# Patient Record
Sex: FEMALE | Race: BLACK OR AFRICAN AMERICAN | NOT HISPANIC OR LATINO | Employment: OTHER | URBAN - METROPOLITAN AREA
[De-identification: names, ages, dates, MRNs, and addresses within clinical notes are randomized per-mention and may not be internally consistent; named-entity substitution may affect disease eponyms.]

---

## 2017-03-12 ENCOUNTER — HOSPITAL ENCOUNTER (EMERGENCY)
Facility: HOSPITAL | Age: 57
Discharge: HOME | End: 2017-03-13
Attending: EMERGENCY MEDICINE
Payer: MEDICARE

## 2017-03-12 VITALS
TEMPERATURE: 99 F | RESPIRATION RATE: 20 BRPM | HEART RATE: 64 BPM | DIASTOLIC BLOOD PRESSURE: 94 MMHG | SYSTOLIC BLOOD PRESSURE: 151 MMHG | OXYGEN SATURATION: 93 %

## 2017-03-12 DIAGNOSIS — R53.1 WEAKNESS: Primary | ICD-10-CM

## 2017-03-12 PROBLEM — R20.1 HYPOESTHESIA: Status: ACTIVE | Noted: 2017-03-12

## 2017-03-12 LAB
ABO + RH BLD: NORMAL
ALBUMIN SERPL BCP-MCNC: 3.3 G/DL
ALP SERPL-CCNC: 75 U/L
ALT SERPL W/O P-5'-P-CCNC: 12 U/L
AMPHET+METHAMPHET UR QL: NEGATIVE
ANION GAP SERPL CALC-SCNC: 11 MMOL/L
APAP SERPL-MCNC: <3 UG/ML
AST SERPL-CCNC: 24 U/L
BARBITURATES UR QL SCN>200 NG/ML: NEGATIVE
BASOPHILS # BLD AUTO: 0.04 K/UL
BASOPHILS NFR BLD: 0.6 %
BENZODIAZ UR QL SCN>200 NG/ML: NORMAL
BILIRUB SERPL-MCNC: 0.3 MG/DL
BLD GP AB SCN CELLS X3 SERPL QL: NORMAL
BLOOD GROUP ANTIBODIES SERPL: NORMAL
BUN SERPL-MCNC: 12 MG/DL
BZE UR QL SCN: NEGATIVE
CALCIUM SERPL-MCNC: 9 MG/DL
CANNABINOIDS UR QL SCN: NEGATIVE
CHLORIDE SERPL-SCNC: 105 MMOL/L
CHOLEST/HDLC SERPL: 5.4 {RATIO}
CO2 SERPL-SCNC: 26 MMOL/L
CREAT SERPL-MCNC: 1 MG/DL
CREAT UR-MCNC: 93 MG/DL
DIFFERENTIAL METHOD: ABNORMAL
EOSINOPHIL # BLD AUTO: 0.3 K/UL
EOSINOPHIL NFR BLD: 4.8 %
ERYTHROCYTE [DISTWIDTH] IN BLOOD BY AUTOMATED COUNT: 14.8 %
EST. GFR  (AFRICAN AMERICAN): >60 ML/MIN/1.73 M^2
EST. GFR  (NON AFRICAN AMERICAN): >60 ML/MIN/1.73 M^2
ETHANOL SERPL-MCNC: <10 MG/DL
GLUCOSE SERPL-MCNC: 86 MG/DL
HCT VFR BLD AUTO: 35 %
HDL/CHOLESTEROL RATIO: 18.6 %
HDLC SERPL-MCNC: 204 MG/DL
HDLC SERPL-MCNC: 38 MG/DL
HGB BLD-MCNC: 11.9 G/DL
INR PPP: 1.1
LDLC SERPL CALC-MCNC: 134.8 MG/DL
LYMPHOCYTES # BLD AUTO: 3.6 K/UL
LYMPHOCYTES NFR BLD: 53.3 %
MCH RBC QN AUTO: 27.2 PG
MCHC RBC AUTO-ENTMCNC: 34 %
MCV RBC AUTO: 80 FL
METHADONE UR QL SCN>300 NG/ML: NEGATIVE
MONOCYTES # BLD AUTO: 0.3 K/UL
MONOCYTES NFR BLD: 4.2 %
NEUTROPHILS # BLD AUTO: 2.5 K/UL
NEUTROPHILS NFR BLD: 37 %
NONHDLC SERPL-MCNC: 166 MG/DL
OPIATES UR QL SCN: NEGATIVE
PCP UR QL SCN>25 NG/ML: NEGATIVE
PLATELET # BLD AUTO: 359 K/UL
PMV BLD AUTO: 10.4 FL
POCT GLUCOSE: 81 MG/DL (ref 70–110)
POTASSIUM SERPL-SCNC: 3.1 MMOL/L
PROT SERPL-MCNC: 7.6 G/DL
PROTHROMBIN TIME: 11.2 SEC
RBC # BLD AUTO: 4.37 M/UL
SODIUM SERPL-SCNC: 142 MMOL/L
TOXICOLOGY INFORMATION: NORMAL
TRIGL SERPL-MCNC: 156 MG/DL
TROPONIN I SERPL DL<=0.01 NG/ML-MCNC: 0.01 NG/ML
TSH SERPL DL<=0.005 MIU/L-ACNC: 0.43 UIU/ML
WBC # BLD AUTO: 6.83 K/UL

## 2017-03-12 PROCEDURE — 96374 THER/PROPH/DIAG INJ IV PUSH: CPT

## 2017-03-12 PROCEDURE — 63600175 PHARM REV CODE 636 W HCPCS: Performed by: EMERGENCY MEDICINE

## 2017-03-12 PROCEDURE — 96375 TX/PRO/DX INJ NEW DRUG ADDON: CPT

## 2017-03-12 PROCEDURE — 99285 EMERGENCY DEPT VISIT HI MDM: CPT | Mod: ,,, | Performed by: EMERGENCY MEDICINE

## 2017-03-12 PROCEDURE — 93010 ELECTROCARDIOGRAM REPORT: CPT | Mod: ,,, | Performed by: INTERNAL MEDICINE

## 2017-03-12 PROCEDURE — 84443 ASSAY THYROID STIM HORMONE: CPT

## 2017-03-12 PROCEDURE — 86870 RBC ANTIBODY IDENTIFICATION: CPT

## 2017-03-12 PROCEDURE — 80320 DRUG SCREEN QUANTALCOHOLS: CPT

## 2017-03-12 PROCEDURE — 80061 LIPID PANEL: CPT

## 2017-03-12 PROCEDURE — 84484 ASSAY OF TROPONIN QUANT: CPT

## 2017-03-12 PROCEDURE — 85025 COMPLETE CBC W/AUTO DIFF WBC: CPT

## 2017-03-12 PROCEDURE — 63600175 PHARM REV CODE 636 W HCPCS: Performed by: STUDENT IN AN ORGANIZED HEALTH CARE EDUCATION/TRAINING PROGRAM

## 2017-03-12 PROCEDURE — 99223 1ST HOSP IP/OBS HIGH 75: CPT | Mod: ,,, | Performed by: PSYCHIATRY & NEUROLOGY

## 2017-03-12 PROCEDURE — 86901 BLOOD TYPING SEROLOGIC RH(D): CPT

## 2017-03-12 PROCEDURE — 80329 ANALGESICS NON-OPIOID 1 OR 2: CPT

## 2017-03-12 PROCEDURE — 99285 EMERGENCY DEPT VISIT HI MDM: CPT | Mod: 25

## 2017-03-12 PROCEDURE — 86900 BLOOD TYPING SEROLOGIC ABO: CPT

## 2017-03-12 PROCEDURE — 82570 ASSAY OF URINE CREATININE: CPT

## 2017-03-12 PROCEDURE — 80053 COMPREHEN METABOLIC PANEL: CPT

## 2017-03-12 PROCEDURE — 85610 PROTHROMBIN TIME: CPT

## 2017-03-12 RX ORDER — MIDAZOLAM HYDROCHLORIDE 1 MG/ML
2 INJECTION INTRAMUSCULAR; INTRAVENOUS
Status: COMPLETED | OUTPATIENT
Start: 2017-03-12 | End: 2017-03-12

## 2017-03-12 RX ORDER — ONDANSETRON 2 MG/ML
4 INJECTION INTRAMUSCULAR; INTRAVENOUS
Status: COMPLETED | OUTPATIENT
Start: 2017-03-12 | End: 2017-03-12

## 2017-03-12 RX ADMIN — ONDANSETRON 4 MG: 2 INJECTION INTRAMUSCULAR; INTRAVENOUS at 08:03

## 2017-03-12 RX ADMIN — MIDAZOLAM HYDROCHLORIDE 2 MG: 1 INJECTION, SOLUTION INTRAMUSCULAR; INTRAVENOUS at 09:03

## 2017-03-12 NOTE — ED PROVIDER NOTES
"Encounter Date: 3/12/2017    SCRIBE #1 NOTE: I, Ari Abdullahi, am scribing for, and in the presence of,  Dr. Israel. I have scribed the following portions of the note - the Resident attestation.       History     Chief Complaint   Patient presents with    Numbness     numbness in right arm and blurred vision since 1130am today     Review of patient's allergies indicates:   Allergen Reactions    Amlodipine Other (See Comments)     Patient says that she does not do well with generic amlodipine but does do well with Norvasc (amlodipine).    Bentyl [dicyclomine] Hives    Darvocet a500 [propoxyphene n-acetaminophen] Hives    Morphine Nausea And Vomiting    Toradol [ketorolac] Hives    Zofran [ondansetron hcl (pf)] Nausea And Vomiting     HPI Comments: Ms. Woodard is a 56 yo female w/ PMH significant for HTN, ulcerative colitis, portal vein thrombosis, presenting via EMS w/ complaints of RUE numbness/tingling, weakness, and R-sided blurry vision with onset at 3239-1096 today.    Pt reports that her RUE had cramping pains followed by RUE weakness, and R-sided blurry vision and RLE numbness/tingling.  EMS reports that they were unable to appreciate focal weakness on their exam.  On arrival to ED, intern exam found RUE weakness.  Pt states this has never happened to her before.    Pt reports history of a clot, unclear where.  EMR search reveals portal vein thrombosis.  Pt states that she has been off anticoagulation for >1 yr.    Pt reports that her BP runs "normal," unable to specify any numbers.  Pt states she had 8/10, sharp, chest pain the night prior to presentation.  States that this pain comes/goes, not associated with exertion, lasts 2 minutes duration.  Reports she has not been taking her BP meds.  Initially stated she wasn't taking them because her BP was "normal," then later states the meds are too expensive.    Pt reports she has had a "cold" with shortness of breath, cough, fevers/chills x1 week.    Pt " reports that she drank a wine cooler last night.  Denies recreational drug use or cigarette smoking.  Notes that she was around others that smoked marijuana yesterday, but states that she did not smoke any.     The history is provided by the patient and the EMS personnel.     Past Medical History:   Diagnosis Date    Asthma     Hypertension     Liver abscess     hx    Portal vein thrombosis 10/2013    Portal vein thrombosis 10/2013    Ulcerative colitis 9/15/2013     Past Surgical History:   Procedure Laterality Date    ABDOMINAL SURGERY      3 exploratory surgeries    APPENDECTOMY       SECTION, LOW TRANSVERSE      3 C-sections    CHOLECYSTECTOMY      COLONOSCOPY      COLONOSCOPY N/A 2016    Procedure: COLONOSCOPY;  Surgeon: Brad King MD;  Location: [facility] ([address] [place] [address]);  Service: Endoscopy;  Laterality: N/A;    EXPLORATORY LAPAROTOMY W/ BOWEL RESECTION      HERNIA REPAIR      HYSTERECTOMY      INCISIONAL HERNIA REPAIR      UPPER GASTROINTESTINAL ENDOSCOPY       Family History   Problem Relation Age of Onset    Hypertension Maternal Grandmother     Cirrhosis Maternal Aunt     Breast cancer Maternal Aunt     Colon cancer Maternal Uncle     Breast cancer Cousin     Celiac disease Neg Hx     Colon polyps Neg Hx     Crohn's disease Neg Hx     Cystic fibrosis Neg Hx     Esophageal cancer Neg Hx     Hemochromatosis Neg Hx     Inflammatory bowel disease Neg Hx     Irritable bowel syndrome Neg Hx     Liver cancer Neg Hx     Liver disease Neg Hx     Rectal cancer Neg Hx     Stomach cancer Neg Hx     Ulcerative colitis Neg Hx     Scott's disease Neg Hx      Social History   Substance Use Topics    Smoking status: Former Smoker     Packs/day: 0.10     Years: 0.50     Types: Cigarettes     Quit date: 1976    Smokeless tobacco: Never Used    Alcohol use 0.6 oz/week     1 Glasses of wine per week      Comment: rare     Review of Systems   Constitutional:  Positive for chills and fever.   HENT: Negative for hearing loss.    Eyes: Positive for visual disturbance.   Respiratory: Positive for cough. Negative for shortness of breath.    Cardiovascular: Positive for chest pain. Negative for palpitations and leg swelling.   Gastrointestinal: Negative for nausea and vomiting.   Neurological: Positive for weakness, numbness and headaches. Negative for seizures and speech difficulty.       Physical Exam   Initial Vitals   BP Pulse Resp Temp SpO2   03/12/17 1525 03/12/17 1525 03/12/17 1525 03/12/17 1525 03/12/17 1525   203/119 77 15 98.9 °F (37.2 °C) 100 %     Physical Exam    Constitutional: She appears well-developed and well-nourished. No distress.   Cardiovascular: Normal rate, regular rhythm and normal heart sounds. Exam reveals no friction rub.    No murmur heard.  Pulmonary/Chest: Breath sounds normal. No respiratory distress. She has no wheezes.   Mild tenderness to palpation of R lumbar musculature    No focal tenderness to palpation of spinal processes throughout spine   Abdominal: Soft. Bowel sounds are normal. She exhibits no distension.   Neurological: She is alert and oriented to person, place, and time.   LOC: alert and follows requests  Speech: mild dysarthria  Orientation: Person, Place, Time  EOM (CN III, IV, VI): Full/intact  Pupils (CN III, IV, VI): PERRL  Facial Sensation (CN V): Symmetric  Facial Movement (CN VII): symmetric facial expression  Hearing (CN VIII): intact bilaterally  Gag Reflex (CN IX, X): normal/symmetric  Shoulder/Neck (CN XI): SCM-Left: Normal ; SCM-Right: Normal ; Shoulder Shrug: Normal/Symetric  Tongue (CN XII): to midline, no lacerations  Reflexes: 2+ b/l brachioradialis, biceps.  1+ patellar b/l  Motor*: Arm Left:  Normal (5/5), Leg Left:   Normal (5/5), Arm Right:   Paretic:  4-/5, Leg Right:   Normal (5/5)  Cerebellar*: Finger-to-nose normal on LUE.  Ataxia present on RUE finger-to-nose  Sensation: sensation to light touch intact in  BUE and BLE           ED Course   Procedures  Labs Reviewed   CBC W/ AUTO DIFFERENTIAL - Abnormal; Notable for the following:        Result Value    Hemoglobin 11.9 (*)     Hematocrit 35.0 (*)     MCV 80 (*)     RDW 14.8 (*)     Platelets 359 (*)     Gran% 37.0 (*)     Lymph% 53.3 (*)     All other components within normal limits   COMPREHENSIVE METABOLIC PANEL - Abnormal; Notable for the following:     Potassium 3.1 (*)     Albumin 3.3 (*)     All other components within normal limits   LIPID PANEL - Abnormal; Notable for the following:     Cholesterol 204 (*)     Triglycerides 156 (*)     HDL 38 (*)     HDL/Chol Ratio 18.6 (*)     Total Cholesterol/HDL Ratio 5.4 (*)     All other components within normal limits   ACETAMINOPHEN LEVEL - Abnormal; Notable for the following:     Acetaminophen (Tylenol), Serum <3.0 (*)     All other components within normal limits   PROTIME-INR   TSH   ALCOHOL,MEDICAL (ETHANOL)   DRUG SCREEN PANEL, URINE EMERGENCY   TROPONIN I   TYPE & SCREEN   ANTIBODY IDENTIFICATION   POCT GLUCOSE     EKG Readings: (Independently Interpreted)   Sinus mario (59).  No ST elevation/depression       X-Rays:   Independently Interpreted Readings:   Chest X-Ray: No consolidations/opacities, no pneumothorax.    Head CT: Negative for acute infarction or hemorrhage.     Medical Decision Making:   History:   Old Medical Records: I decided to obtain old medical records.  Initial Assessment:   Ms. Woodard is a 56 yo female w/ PMH significant for HTN, ulcerative colitis, portal vein thrombosis, presenting via EMS w/ complaints of RUE numbness/tingling, weakness, and R-sided blurry vision with onset at 9177-7894 today.    Differential Diagnosis:   Stroke, hypertensive emergency, drug overdose/intoxication, infection, anemia,  Independently Interpreted Test(s):   I have ordered and independently interpreted X-rays - see prior notes.  I have ordered and independently interpreted EKG Reading(s) - see prior  notes  Clinical Tests:   Lab Tests: Ordered and Reviewed  Radiological Study: Ordered and Reviewed  Medical Tests: Ordered and Reviewed  ED Management:  CT Head negative   Vascular neurology consulted  MRI Brain ordered      -MRI Brain only partially completed but no evidence of ischemia on completed portions. Pt states she feels much better.Discussed with stroke service who is comfortable with her discharge and outpatient follow up. Will D/C at this time             Scribe Attestation:   Scribe #1: I performed the above scribed service and the documentation accurately describes the services I performed. I attest to the accuracy of the note.    Attending Attestation:   Physician Attestation Statement for Resident:  As the supervising MD   Physician Attestation Statement: I have personally seen and examined this patient.   I agree with the above history. -: This is a 57 y.o. female who presents with RUE numbness and tingling with weakness in the arm. Pt also complains of chest pain that is worse when moving or coughing.    As the supervising MD I agree with the above PE.   -: On exam pt appears comfortable and in no acute distress. I am in agreement with the above neurologic exam.    As the supervising MD I agree with the above treatment, course, plan, and disposition.   -: Will discuss with stroke neurology, head CT was unremarkable. Will obtain MRI.           Physician Attestation for Scribe:  Physician Attestation Statement for Scribe #1: I, Dr. Israel, reviewed documentation, as scribed by Ari Abdullahi in my presence, and it is both accurate and complete.         MRI neg  Will d/c home        ED Course     Clinical Impression:   The encounter diagnosis was Weakness.          Michael Alvarado MD  Resident  03/13/17 0011       Serge Israel MD  03/15/17 6050

## 2017-03-12 NOTE — SUBJECTIVE & OBJECTIVE
Past Medical History:   Diagnosis Date    Asthma     Hypertension     Liver abscess     hx    Portal vein thrombosis 10/2013    Portal vein thrombosis 10/2013    Ulcerative colitis 9/15/2013     Past Surgical History:   Procedure Laterality Date    ABDOMINAL SURGERY      3 exploratory surgeries    APPENDECTOMY       SECTION, LOW TRANSVERSE      3 C-sections    CHOLECYSTECTOMY      COLONOSCOPY      COLONOSCOPY N/A 2016    Procedure: COLONOSCOPY;  Surgeon: Brad King MD;  Location: [facility] ([address] [place] [address]);  Service: Endoscopy;  Laterality: N/A;    EXPLORATORY LAPAROTOMY W/ BOWEL RESECTION      HERNIA REPAIR      HYSTERECTOMY      INCISIONAL HERNIA REPAIR      UPPER GASTROINTESTINAL ENDOSCOPY       Family History   Problem Relation Age of Onset    Hypertension Maternal Grandmother     Cirrhosis Maternal Aunt     Breast cancer Maternal Aunt     Colon cancer Maternal Uncle     Breast cancer Cousin     Celiac disease Neg Hx     Colon polyps Neg Hx     Crohn's disease Neg Hx     Cystic fibrosis Neg Hx     Esophageal cancer Neg Hx     Hemochromatosis Neg Hx     Inflammatory bowel disease Neg Hx     Irritable bowel syndrome Neg Hx     Liver cancer Neg Hx     Liver disease Neg Hx     Rectal cancer Neg Hx     Stomach cancer Neg Hx     Ulcerative colitis Neg Hx     Scott's disease Neg Hx      Social History   Substance Use Topics    Smoking status: Former Smoker     Packs/day: 0.10     Years: 0.50     Types: Cigarettes     Quit date: 1976    Smokeless tobacco: Never Used    Alcohol use 0.6 oz/week     1 Glasses of wine per week      Comment: rare     Review of patient's allergies indicates:   Allergen Reactions    Amlodipine Other (See Comments)     Patient says that she does not do well with generic amlodipine but does do well with Norvasc (amlodipine).    Bentyl [dicyclomine] Hives    Darvocet a500 [propoxyphene n-acetaminophen] Hives    Morphine  Nausea And Vomiting    Toradol [ketorolac] Hives    Zofran [ondansetron hcl (pf)] Nausea And Vomiting     Medications: I have reviewed the current medication administration record.      (Not in a hospital admission)    Review of Systems   Constitutional: Negative for chills and fever.   HENT: Negative for facial swelling.    Eyes: Negative for visual disturbance.   Respiratory: Negative for cough and shortness of breath.    Cardiovascular: Negative for leg swelling.   Gastrointestinal: Negative for vomiting.   Musculoskeletal: Negative for joint swelling.   Skin: Negative for rash.   Neurological: Positive for weakness and numbness. Negative for dizziness, facial asymmetry and headaches.   Psychiatric/Behavioral: Negative for agitation and behavioral problems.     Objective:     Vital Signs (Most Recent):  Temp: 98.9 °F (37.2 °C) (03/12/17 1525)  Pulse: 60 (03/12/17 1727)  Resp: 15 (03/12/17 1727)  BP: (!) 163/74 (03/12/17 1733)  SpO2: 99 % (03/12/17 1733)    Vital Signs Range (Last 24H):  Temp:  [98.9 °F (37.2 °C)]   Pulse:  [58-77]   Resp:  [15-19]   BP: (162-203)/()   SpO2:  [98 %-100 %]     Physical Exam   Constitutional: She is oriented to person, place, and time. She appears well-developed and well-nourished. No distress.   HENT:   Head: Normocephalic and atraumatic.   Eyes: EOM are normal. Pupils are equal, round, and reactive to light.   Neck: Normal range of motion.   Cardiovascular: Normal rate.    Pulmonary/Chest: Effort normal. No respiratory distress.   Abdominal: Soft. She exhibits no distension.   Musculoskeletal: Normal range of motion.   Neurological: She is alert and oriented to person, place, and time.   Skin: Skin is warm and dry.   Psychiatric: She has a normal mood and affect. Her behavior is normal.   Vitals reviewed.      Neurological Exam:   LOC: alert and follows requests  Language: No aphasia  Speech: No dysarthria  Orientation: Person, Place, Time  Memory: Recent memory intact,  Remote memory intact, Age correct, Month correct  Visual Fields (CN II): Full  EOM (CN III, IV, VI): Full/intact  Oculocephalics: normal  Pupils (CN III, IV, VI): PERRL  Facial Sensation (CN V): Facial sensory loss right upper and right lower  Facial Movement (CN VII): symmetric facial expression  Hearing (CN VIII): intact bilaterally  Motor*: Arm Left:  Normal (5/5), Leg Left:   Normal (5/5), Arm Right:   Normal (5/5), Leg Right:   Normal (5/5)  Cerebellar*: Normal limb, Normal gait  , Normal stance  Sensation: gerardo-hypoesthesia right  Tone: Arm-Left: normal; Leg-Left: normal; Arm-Right: normal; Leg-Right: normal     R hand  weakness        Stroke Team Times:   Last Known Normal Date and Time : 3/12/209111:30  Symptom Onset Date and Time:3/12/615752:30  Stroke Team Called Date and Time: 3/12/079008:52  Stroke Team Arrived Date and Time: 3/12/813667:00  CT Interpretation Time: 16:24  Intervention Time: 16:31 (no intervention needed)  NIH Stroke Scale:  Interval: baseline (upon arrival/admit)  Level of Consciousness: 0 - alert  LOC Questions: 0 - answers both correctly  LOC Commands: 0 - performs both correctly  Best Gaze: 0 - normal  Visual: 0 - no visual loss  Facial Palsy: 0 - normal  Motor Left Arm: 0 - no drift  Motor Right Arm: 0 - no drift  Motor Left Le - no drift  Motor Right Le - no drift  Limb Ataxia: 0 - absent  Sensory: 1 - mild to moderate loss  Best Language: 0 - no aphasia  Dysarthria: 0 - normal articulation  Extinction and Inattention: 0 - no neglect  NIH Stroke Scale Total: 1  Modified Lori Scale:   Timeline: Prior to symptoms onset  Modified Lori Score: 0 - no symptoms        Laboratory:  CMP:   Recent Labs  Lab 17  1607   CALCIUM 9.0   ALBUMIN 3.3*   PROT 7.6      K 3.1*   CO2 26      BUN 12   CREATININE 1.0   ALKPHOS 75   ALT 12   AST 24   BILITOT 0.3     CBC:   Recent Labs  Lab 17  1607   WBC 6.83   RBC 4.37   HGB 11.9*   HCT 35.0*   *   MCV 80*    MCH 27.2   MCHC 34.0     Lipid Panel:   Recent Labs  Lab 03/12/17  1607   CHOL 204*   LDLCALC 134.8   HDL 38*   TRIG 156*     Coagulation:   Recent Labs  Lab 03/12/17  1607   INR 1.1     Platelet Aggregation Study: No results for input(s): PLTAGG, PLTAGINTERP, PLTAGREGLACO, ADPPLTAGGREG in the last 168 hours.  Hgb A1C: No results for input(s): HGBA1C in the last 168 hours.  TSH:   Recent Labs  Lab 03/12/17  1607   TSH 0.426       Diagnostic Results:  Brain Imaging:    CT Head. Date: 03/12/17  Acute Pathology: None

## 2017-03-12 NOTE — ED AVS SNAPSHOT
[facility]  [address] [facility] [id number]               Rosmery Woodard   3/12/2017  3:43 PM   ED    Description:  Female : 1960   Department:  [facility]           Your Care was Coordinated By:     Provider Role From To    Serge Israel MD Attending Provider 17 1538 --    Benoit Travis MD Resident 17 1527 17 2200    Yovani Rhodes MD ED Temporary Attending 17 2113 --    Michael Alvarado MD Resident 17 --      Reason for Visit     Numbness           Diagnoses this Visit        Comments    Weakness    -  Primary       ED Disposition     None           To Do List           Follow-up Information     Follow up with Meg Deluca MD.    Specialty:  Family Medicine    Contact information:    [address]  [facility] [address]  [phone]          Follow up with [facility].    Specialty:  Neurology    Contact information:    [address] [facility] [id number] [phone]    Additional information:    7th Floor      [facility]     [facility] -  Assistance  Registered nurses in the [facility] provide clinical advisement, health education, appointment booking, and other advisory services.  Call for this free service at [phone].             Medications           Message regarding Medications     Verify the changes and/or additions to your medication regime listed below are the same as discussed with your clinician today.  If any of these changes or additions are incorrect, please notify your healthcare provider.        These medications were administered today        Dose Freq    ondansetron injection 4 mg 4 mg ED 1 Time    Sig: Inject 4 mg into the vein ED 1 Time.    Class: Normal    Route: Intravenous    midazolam injection 2 mg 2 mg ED 1 Time    Sig: Inject 2 mLs (2 mg total) into the vein ED 1 Time.     Class: Normal    Route: Intravenous           Verify that the below list of medications is an accurate representation of the medications you are currently taking.  If none reported, the list may be blank. If incorrect, please contact your healthcare provider. Carry this list with you in case of emergency.           Current Medications     alprazolam (XANAX) 0.5 MG tablet Take 0.5 mg by mouth 3 (three) times daily as needed for Anxiety.     ASACOL  mg TbEC Take 1,600 mg by mouth 3 (three) times daily.     diphenhydrAMINE (BENADRYL) 25 mg capsule Take 25 mg by mouth every 6 (six) hours as needed for Itching.    diphenoxylate-atropine 2.5-0.025 mg (LOMOTIL) 2.5-0.025 mg per tablet Take 1 tablet by mouth 4 (four) times daily as needed for Diarrhea.    gabapentin (NEURONTIN) 100 MG capsule TAKE THREE CAPSULES BY MOUTH EVERY EVENING    multivitamin (THERAGRAN) per tablet Take 1 tablet by mouth once daily.     omeprazole (PRILOSEC) 20 MG capsule Take 20 mg by mouth 2 (two) times daily.     promethazine (PHENERGAN) 25 MG tablet Take 1 tablet (25 mg total) by mouth every 6 (six) hours as needed for Nausea.    tramadol (ULTRAM) 50 mg tablet TAKE 1 TABLET BY MOUTH  EVERY 8 HOURS AS NEEDED FOR PAIN    zolpidem (AMBIEN) 10 mg Tab Take 10 mg by mouth every evening.           Clinical Reference Information           Your Vitals Were     BP Pulse Temp Resp SpO2       151/94 64 98.9 °F (37.2 °C) (Oral) 20 93%       Allergies as of 3/13/2017        Reactions    Amlodipine Other (See Comments)    Patient says that she does not do well with generic amlodipine but does do well with Norvasc (amlodipine).    Bentyl [Dicyclomine] Hives    Darvocet A500 [Propoxyphene N-acetaminophen] Hives    Morphine Nausea And Vomiting    Toradol [Ketorolac] Hives    Zofran [Ondansetron Hcl (Pf)] Nausea And Vomiting      Immunizations Administered on Date of Encounter - 3/13/2017     None      ED Micro, Lab, POCT     Start Ordered       Status  Ordering Provider    03/12/17 1624 03/12/17 1624  POCT glucose  Once      Final result     03/12/17 1604 03/12/17 1603  Troponin I  STAT      Final result     03/12/17 1540 03/12/17 1539  Ethanol  Once      Final result     03/12/17 1540 03/12/17 1540  Drug screen panel, emergency  STAT      Final result     03/12/17 1540 03/12/17 1540  Acetaminophen level  STAT      Final result     03/12/17 1539 03/12/17 1538  CBC W/ AUTO DIFFERENTIAL  Once      Final result     03/12/17 1539 03/12/17 1538  Comprehensive metabolic panel  Once      Final result     03/12/17 1539 03/12/17 1538  Protime-INR  Once      Final result     03/12/17 1539 03/12/17 1538  TSH  Once      Final result     03/12/17 1539 03/12/17 1538  POCT glucose  Once      Acknowledged     03/12/17 1539 03/12/17 1538  LDL - Lipid Panel  STAT      Final result       ED Imaging Orders     Start Ordered       Status Ordering Provider    03/12/17 1637 03/12/17 1637  MRI Brain Without Contrast  1 time imaging      Final result     03/12/17 1539 03/12/17 1538  CT Head Without Contrast  1 time imaging      Final result     03/12/17 1539 03/12/17 1538  X-Ray Chest AP Portable  1 time imaging      Final result         Discharge Instructions             Weakness (Uncertain Cause)  Based on your exam today, the exact cause of your weakness is not certain. However, your weakness does not seem to be a sign of a serious illness at this time. Keep an eye on your symptoms and get medical advice as instructed below.  Home care  · Rest at home today. Do not over-exert yourself.  · Take any medicine as prescribed.  · For the next few days, drink extra fluids (unless your healthcare provider wants you to restrict fluids for other reasons). Do not skip meals.  Follow-up care  Follow up with your healthcare provider or as advised.  When to seek medical advice  Call your healthcare provider for any of the following  · Worsening of your symptoms  · Symptoms don't start getting better  within 2 days  · Fever of 100.4º F (38º C) or higher, or as directed by your healthcare provider·    Call 911  Get emergency medical care for any of these:  · Chest, arm, neck, jaw or upper back pain  · Trouble breathing  · Numbness or weakness of the face, one arm or one leg  · Slurred speech, confusion, trouble speaking, walking or seeing  · Blood in vomit or stool (black or red color)  · Loss of consciousness  Date Last Reviewed: 6/10/2015  © 6833-2982 [employer]. [address] [place] [address]. All rights reserved. This information is not intended as a substitute for professional medical care. Always follow your healthcare professional's instructions.          Smoking Cessation     If you would like to quit smoking:   You may be eligible for free services if you are a [place] resident and started smoking cigarettes before September 1, 1988.  Call the Smoking Cessation Trust (SCT) toll free at (829) 855-9262 or (060) 730-1428.   Call 1-800-QUIT-NOW if you do not meet the above criteria.             [facility] complies with applicable Federal civil rights laws and does not discriminate on the basis of race, color, national origin, age, disability, or sex.        Language Assistance Services     ATTENTION: Language assistance services are available, free of charge. Please call [phone].      ATENCIÓN: Si habla español, tiene a sandoval disposición servicios gratuitos de asistencia lingüística. Llame al [phone].     CHÚ Ý: N?u b?n nói Ti?ng Vi?t, có các d?ch v? h? tr? ngôn ng? mi?n phí dành cho b?n. G?i s? [phone].

## 2017-03-12 NOTE — ED TRIAGE NOTES
"Patient arrives via EMS for right sided "cramping" and decreased sensation, +tingling to right arm around 1pm today. Patient states was sitting in car after getting "a little upset" and reports acute cramping and locking up of right arm. Also reports associated HA and blurry vision, endorses uncontrolled hypertension and non-compliance with medication over the past few months. Called EMS after estimated about 30 minutes of symptoms without relief. Currently reports mild relief of symptoms, +tingling and HA to right arm and right leg. Full range of motion noted to all extremities, +clear and fluent speech, AAOx4  "

## 2017-03-13 NOTE — ASSESSMENT & PLAN NOTE
Rosmery Woodard is a 57 y.o. female presenting with pain, cramping, hand weakness, and hypoesthesia.     CT negative for acute stroke. MRI pending. May admit to stroke service depending on MRI results.

## 2017-03-13 NOTE — PROGRESS NOTES
Pt arrives to MRI.  Pt reports she needs medication prior to scan.  Attempted to reach ED nurse X3.

## 2017-03-13 NOTE — ASSESSMENT & PLAN NOTE
Stroke risk factor  Patient not on home BP medications  Recommend following up with PCP for BP management

## 2017-03-13 NOTE — PROGRESS NOTES
Patient transferred to MRI table without injury.  Patient placed on MRI telemetry and O2 monitoring.  Patient calm and stable NAD.  Will continue to monitor throughout MRI scan.  Telemetry room notified of portable monitoring removed for scan.  Will call telemetry room when patient is placed back on portable telemetry.

## 2017-03-13 NOTE — PROGRESS NOTES
Called ED to update pts primary nurse of unsuccessful attempt of MRI scan.  Unable to reach pts nurse via telephone.

## 2017-03-13 NOTE — PROGRESS NOTES
MRI scan uncomplete.  Patient unable to complete MRI scan, 2 mg versed given prior to scan .  Patient placed back on portable telemetry box, telemetry room notified.  Patient calm and stable, denies need.  Awaiting transport back to room ED 7.

## 2017-03-13 NOTE — CONSULTS
[facility]  Vascular Neurology  Comprehensive Stroke Center  Consult Note      Inpatient consult to Vascular (Stroke) Neurology  Consult performed by: BILL GARCIA  Consult ordered by: CHEYANNE HERNANDEZ        Subjective:     History of Present Illness:  Rosmery Woodard is a 57 y.o. Female with a PMHx of crohns, HTN, HLD, and portal vein thrombosis who presented to the ED with pain and cramping in the RUE that began at 12pm on 3/12/17. When she arrived to the hospital she noticed she had more movement in the extremity. She also admits to numbness on her R side.    She denies N/V, headache, dizziness, and changes in vision           Past Medical History:   Diagnosis Date    Asthma     Hypertension     Liver abscess     hx    Portal vein thrombosis 10/2013    Portal vein thrombosis 10/2013    Ulcerative colitis 9/15/2013     Past Surgical History:   Procedure Laterality Date    ABDOMINAL SURGERY      3 exploratory surgeries    APPENDECTOMY       SECTION, LOW TRANSVERSE      3 C-sections    CHOLECYSTECTOMY      COLONOSCOPY      COLONOSCOPY N/A 2016    Procedure: COLONOSCOPY;  Surgeon: Brad King MD;  Location: [facility] ([address] [place] [address]);  Service: Endoscopy;  Laterality: N/A;    EXPLORATORY LAPAROTOMY W/ BOWEL RESECTION      HERNIA REPAIR      HYSTERECTOMY      INCISIONAL HERNIA REPAIR      UPPER GASTROINTESTINAL ENDOSCOPY       Family History   Problem Relation Age of Onset    Hypertension Maternal Grandmother     Cirrhosis Maternal Aunt     Breast cancer Maternal Aunt     Colon cancer Maternal Uncle     Breast cancer Cousin     Celiac disease Neg Hx     Colon polyps Neg Hx     Crohn's disease Neg Hx     Cystic fibrosis Neg Hx     Esophageal cancer Neg Hx     Hemochromatosis Neg Hx     Inflammatory bowel disease Neg Hx     Irritable bowel syndrome Neg Hx     Liver cancer Neg Hx     Liver disease Neg Hx     Rectal cancer Neg Hx      Stomach cancer Neg Hx     Ulcerative colitis Neg Hx     Scott's disease Neg Hx      Social History   Substance Use Topics    Smoking status: Former Smoker     Packs/day: 0.10     Years: 0.50     Types: Cigarettes     Quit date: 9/14/1976    Smokeless tobacco: Never Used    Alcohol use 0.6 oz/week     1 Glasses of wine per week      Comment: rare     Review of patient's allergies indicates:   Allergen Reactions    Amlodipine Other (See Comments)     Patient says that she does not do well with generic amlodipine but does do well with Norvasc (amlodipine).    Bentyl [dicyclomine] Hives    Darvocet a500 [propoxyphene n-acetaminophen] Hives    Morphine Nausea And Vomiting    Toradol [ketorolac] Hives    Zofran [ondansetron hcl (pf)] Nausea And Vomiting     Medications: I have reviewed the current medication administration record.      (Not in a hospital admission)    Review of Systems   Constitutional: Negative for chills and fever.   HENT: Negative for facial swelling.    Eyes: Negative for visual disturbance.   Respiratory: Negative for cough and shortness of breath.    Cardiovascular: Negative for leg swelling.   Gastrointestinal: Negative for vomiting.   Musculoskeletal: Negative for joint swelling.   Skin: Negative for rash.   Neurological: Positive for weakness and numbness. Negative for dizziness, facial asymmetry and headaches.   Psychiatric/Behavioral: Negative for agitation and behavioral problems.     Objective:     Vital Signs (Most Recent):  Temp: 98.9 °F (37.2 °C) (03/12/17 1525)  Pulse: 60 (03/12/17 1727)  Resp: 15 (03/12/17 1727)  BP: (!) 163/74 (03/12/17 1733)  SpO2: 99 % (03/12/17 1733)    Vital Signs Range (Last 24H):  Temp:  [98.9 °F (37.2 °C)]   Pulse:  [58-77]   Resp:  [15-19]   BP: (162-203)/()   SpO2:  [98 %-100 %]     Physical Exam   Constitutional: She is oriented to person, place, and time. She appears well-developed and well-nourished. No distress.   HENT:   Head:  Normocephalic and atraumatic.   Eyes: EOM are normal. Pupils are equal, round, and reactive to light.   Neck: Normal range of motion.   Cardiovascular: Normal rate.    Pulmonary/Chest: Effort normal. No respiratory distress.   Abdominal: Soft. She exhibits no distension.   Musculoskeletal: Normal range of motion.   Neurological: She is alert and oriented to person, place, and time.   Skin: Skin is warm and dry.   Psychiatric: She has a normal mood and affect. Her behavior is normal.   Vitals reviewed.      Neurological Exam:   LOC: alert and follows requests  Language: No aphasia  Speech: No dysarthria  Orientation: Person, Place, Time  Memory: Recent memory intact, Remote memory intact, Age correct, Month correct  Visual Fields (CN II): Full  EOM (CN III, IV, VI): Full/intact  Oculocephalics: normal  Pupils (CN III, IV, VI): PERRL  Facial Sensation (CN V): Facial sensory loss right upper and right lower  Facial Movement (CN VII): symmetric facial expression  Hearing (CN VIII): intact bilaterally  Motor*: Arm Left:  Normal (5/5), Leg Left:   Normal (5/5), Arm Right:   Normal (5/5), Leg Right:   Normal (5/5)  Cerebellar*: Normal limb, Normal gait  , Normal stance  Sensation: gerardo-hypoesthesia right  Tone: Arm-Left: normal; Leg-Left: normal; Arm-Right: normal; Leg-Right: normal     R hand  weakness        Stroke Team Times:   Last Known Normal Date and Time : 3/12/855907:30  Symptom Onset Date and Time:3/12/296396:30  Stroke Team Called Date and Time: 3/12/388117:52  Stroke Team Arrived Date and Time: 3/12/089786:00  CT Interpretation Time: 16:24  Intervention Time: 16:31 (no intervention needed)  NIH Stroke Scale:  Interval: baseline (upon arrival/admit)  Level of Consciousness: 0 - alert  LOC Questions: 0 - answers both correctly  LOC Commands: 0 - performs both correctly  Best Gaze: 0 - normal  Visual: 0 - no visual loss  Facial Palsy: 0 - normal  Motor Left Arm: 0 - no drift  Motor Right Arm: 0 - no  drift  Motor Left Le - no drift  Motor Right Le - no drift  Limb Ataxia: 0 - absent  Sensory: 1 - mild to moderate loss  Best Language: 0 - no aphasia  Dysarthria: 0 - normal articulation  Extinction and Inattention: 0 - no neglect  NIH Stroke Scale Total: 1  Modified Benton Scale:   Timeline: Prior to symptoms onset  Modified Lori Score: 0 - no symptoms        Laboratory:  CMP:   Recent Labs  Lab 17  1607   CALCIUM 9.0   ALBUMIN 3.3*   PROT 7.6      K 3.1*   CO2 26      BUN 12   CREATININE 1.0   ALKPHOS 75   ALT 12   AST 24   BILITOT 0.3     CBC:   Recent Labs  Lab 17  1607   WBC 6.83   RBC 4.37   HGB 11.9*   HCT 35.0*   *   MCV 80*   MCH 27.2   MCHC 34.0     Lipid Panel:   Recent Labs  Lab 17  1607   CHOL 204*   LDLCALC 134.8   HDL 38*   TRIG 156*     Coagulation:   Recent Labs  Lab 17  1607   INR 1.1     Platelet Aggregation Study: No results for input(s): PLTAGG, PLTAGINTERP, PLTAGREGLACO, ADPPLTAGGREG in the last 168 hours.  Hgb A1C: No results for input(s): HGBA1C in the last 168 hours.  TSH:   Recent Labs  Lab 17  1607   TSH 0.426       Diagnostic Results:  Brain Imaging:    CT Head. Date: 17  Acute Pathology: None        Assessment/Plan:     Patient is a 57 y.o. year old female with:    Essential hypertension  Stroke risk factor  Patient not on home BP medications  Recommend following up with PCP for BP management    Hypoesthesia  Rosmery Woodard is a 57 y.o. female presenting with pain, cramping, hand weakness, and hypoesthesia.     CT negative for acute stroke. MRI pending. May admit to stroke service depending on MRI results.      Thrombolysis Candidate? No  1. Contraindications: Outside of treament window      Interventional Revascularization Candidate?  No; No significant neurological deficit    Research Candidate? No          Kimberly Sotomayor PA-C  [facility] Stroke [facility]  Department of Vascular Neurology   [facility]  [place]

## 2017-03-13 NOTE — DISCHARGE INSTRUCTIONS
Weakness (Uncertain Cause)  Based on your exam today, the exact cause of your weakness is not certain. However, your weakness does not seem to be a sign of a serious illness at this time. Keep an eye on your symptoms and get medical advice as instructed below.  Home care  · Rest at home today. Do not over-exert yourself.  · Take any medicine as prescribed.  · For the next few days, drink extra fluids (unless your healthcare provider wants you to restrict fluids for other reasons). Do not skip meals.  Follow-up care  Follow up with your healthcare provider or as advised.  When to seek medical advice  Call your healthcare provider for any of the following  · Worsening of your symptoms  · Symptoms don't start getting better within 2 days  · Fever of 100.4º F (38º C) or higher, or as directed by your healthcare provider·    Call 911  Get emergency medical care for any of these:  · Chest, arm, neck, jaw or upper back pain  · Trouble breathing  · Numbness or weakness of the face, one arm or one leg  · Slurred speech, confusion, trouble speaking, walking or seeing  · Blood in vomit or stool (black or red color)  · Loss of consciousness  Date Last Reviewed: 6/10/2015  © 9117-4112 [employer]. [address] [place] [address], [place] [address]. All rights reserved. This information is not intended as a substitute for professional medical care. Always follow your healthcare professional's instructions.

## 2021-06-25 ENCOUNTER — HOSPITAL ENCOUNTER (EMERGENCY)
Facility: OTHER | Age: 61
Discharge: HOME | End: 2021-06-26
Attending: EMERGENCY MEDICINE
Payer: MEDICARE

## 2021-06-25 DIAGNOSIS — R10.33 ABDOMINAL CRAMPING, PERIUMBILICAL: Primary | ICD-10-CM

## 2021-06-25 DIAGNOSIS — K62.5 BRBPR (BRIGHT RED BLOOD PER RECTUM): ICD-10-CM

## 2021-06-25 DIAGNOSIS — R19.7 NAUSEA VOMITING AND DIARRHEA: ICD-10-CM

## 2021-06-25 DIAGNOSIS — K51.011 ULCERATIVE PANCOLITIS WITH RECTAL BLEEDING: ICD-10-CM

## 2021-06-25 DIAGNOSIS — R10.9 ABDOMINAL PAIN: ICD-10-CM

## 2021-06-25 DIAGNOSIS — R11.2 NAUSEA VOMITING AND DIARRHEA: ICD-10-CM

## 2021-06-25 LAB
ALBUMIN SERPL BCP-MCNC: 3.4 G/DL (ref 3.5–5.2)
ALP SERPL-CCNC: 78 U/L (ref 55–135)
ALT SERPL W/O P-5'-P-CCNC: 20 U/L (ref 10–44)
ANION GAP SERPL CALC-SCNC: 11 MMOL/L (ref 8–16)
AST SERPL-CCNC: 50 U/L (ref 10–40)
BASOPHILS # BLD AUTO: 0.08 K/UL (ref 0–0.2)
BASOPHILS NFR BLD: 0.9 % (ref 0–1.9)
BILIRUB SERPL-MCNC: 0.3 MG/DL (ref 0.1–1)
BUN SERPL-MCNC: 14 MG/DL (ref 8–23)
CALCIUM SERPL-MCNC: 9.1 MG/DL (ref 8.7–10.5)
CHLORIDE SERPL-SCNC: 105 MMOL/L (ref 95–110)
CO2 SERPL-SCNC: 24 MMOL/L (ref 23–29)
CREAT SERPL-MCNC: 1 MG/DL (ref 0.5–1.4)
CRP SERPL-MCNC: 11 MG/L (ref 0–8.2)
DIFFERENTIAL METHOD: ABNORMAL
EOSINOPHIL # BLD AUTO: 0.4 K/UL (ref 0–0.5)
EOSINOPHIL NFR BLD: 3.8 % (ref 0–8)
ERYTHROCYTE [DISTWIDTH] IN BLOOD BY AUTOMATED COUNT: 15.2 % (ref 11.5–14.5)
ERYTHROCYTE [SEDIMENTATION RATE] IN BLOOD: 36 MM/HR (ref 0–20)
EST. GFR  (AFRICAN AMERICAN): >60 ML/MIN/1.73 M^2
EST. GFR  (NON AFRICAN AMERICAN): >60 ML/MIN/1.73 M^2
GLUCOSE SERPL-MCNC: 99 MG/DL (ref 70–110)
HCT VFR BLD AUTO: 37.8 % (ref 37–48.5)
HGB BLD-MCNC: 12.6 G/DL (ref 12–16)
IMM GRANULOCYTES # BLD AUTO: 0.02 K/UL (ref 0–0.04)
IMM GRANULOCYTES NFR BLD AUTO: 0.2 % (ref 0–0.5)
LYMPHOCYTES # BLD AUTO: 3.6 K/UL (ref 1–4.8)
LYMPHOCYTES NFR BLD: 38.1 % (ref 18–48)
MCH RBC QN AUTO: 27.8 PG (ref 27–31)
MCHC RBC AUTO-ENTMCNC: 33.3 G/DL (ref 32–36)
MCV RBC AUTO: 83 FL (ref 82–98)
MONOCYTES # BLD AUTO: 0.7 K/UL (ref 0.3–1)
MONOCYTES NFR BLD: 7.7 % (ref 4–15)
NEUTROPHILS # BLD AUTO: 4.6 K/UL (ref 1.8–7.7)
NEUTROPHILS NFR BLD: 49.3 % (ref 38–73)
NRBC BLD-RTO: 0 /100 WBC
PLATELET # BLD AUTO: 410 K/UL (ref 150–450)
PMV BLD AUTO: 10 FL (ref 9.2–12.9)
POTASSIUM SERPL-SCNC: 5.4 MMOL/L (ref 3.5–5.1)
PROT SERPL-MCNC: 8.6 G/DL (ref 6–8.4)
RBC # BLD AUTO: 4.54 M/UL (ref 4–5.4)
SODIUM SERPL-SCNC: 140 MMOL/L (ref 136–145)
WBC # BLD AUTO: 9.4 K/UL (ref 3.9–12.7)

## 2021-06-25 PROCEDURE — 85651 RBC SED RATE NONAUTOMATED: CPT | Performed by: EMERGENCY MEDICINE

## 2021-06-25 PROCEDURE — 80053 COMPREHEN METABOLIC PANEL: CPT | Performed by: EMERGENCY MEDICINE

## 2021-06-25 PROCEDURE — 96361 HYDRATE IV INFUSION ADD-ON: CPT

## 2021-06-25 PROCEDURE — 63600175 PHARM REV CODE 636 W HCPCS: Performed by: EMERGENCY MEDICINE

## 2021-06-25 PROCEDURE — 85025 COMPLETE CBC W/AUTO DIFF WBC: CPT | Performed by: EMERGENCY MEDICINE

## 2021-06-25 PROCEDURE — 86140 C-REACTIVE PROTEIN: CPT | Performed by: EMERGENCY MEDICINE

## 2021-06-25 PROCEDURE — 25000003 PHARM REV CODE 250: Performed by: EMERGENCY MEDICINE

## 2021-06-25 PROCEDURE — 99285 EMERGENCY DEPT VISIT HI MDM: CPT | Mod: 25

## 2021-06-25 PROCEDURE — 96365 THER/PROPH/DIAG IV INF INIT: CPT

## 2021-06-25 PROCEDURE — 96375 TX/PRO/DX INJ NEW DRUG ADDON: CPT

## 2021-06-25 RX ORDER — FAMOTIDINE 10 MG/ML
20 INJECTION INTRAVENOUS
Status: COMPLETED | OUTPATIENT
Start: 2021-06-25 | End: 2021-06-25

## 2021-06-25 RX ORDER — MORPHINE SULFATE 10 MG/ML
2 INJECTION INTRAMUSCULAR; INTRAVENOUS; SUBCUTANEOUS
Status: COMPLETED | OUTPATIENT
Start: 2021-06-25 | End: 2021-06-25

## 2021-06-25 RX ORDER — HYOSCYAMINE SULFATE 0.5 MG/ML
0.5 INJECTION, SOLUTION SUBCUTANEOUS
Status: COMPLETED | OUTPATIENT
Start: 2021-06-25 | End: 2021-06-25

## 2021-06-25 RX ORDER — SODIUM CHLORIDE 9 MG/ML
INJECTION, SOLUTION INTRAVENOUS
Status: COMPLETED | OUTPATIENT
Start: 2021-06-25 | End: 2021-06-26

## 2021-06-25 RX ADMIN — SODIUM CHLORIDE: 0.9 INJECTION, SOLUTION INTRAVENOUS at 10:06

## 2021-06-25 RX ADMIN — HYOSCYAMINE SULFATE 0.5 MG: 0.5 INJECTION, SOLUTION SUBCUTANEOUS at 10:06

## 2021-06-25 RX ADMIN — MORPHINE SULFATE 2 MG: 10 INJECTION, SOLUTION INTRAMUSCULAR; INTRAVENOUS at 11:06

## 2021-06-25 RX ADMIN — FAMOTIDINE 20 MG: 10 INJECTION INTRAVENOUS at 11:06

## 2021-06-25 RX ADMIN — PROMETHAZINE HYDROCHLORIDE 12.5 MG: 25 INJECTION INTRAMUSCULAR; INTRAVENOUS at 11:06

## 2021-06-25 RX ADMIN — PROMETHAZINE HYDROCHLORIDE 12.5 MG: 25 INJECTION INTRAMUSCULAR; INTRAVENOUS at 10:06

## 2021-06-26 VITALS
SYSTOLIC BLOOD PRESSURE: 119 MMHG | RESPIRATION RATE: 16 BRPM | HEART RATE: 73 BPM | OXYGEN SATURATION: 99 % | HEIGHT: 63 IN | WEIGHT: 160 LBS | TEMPERATURE: 98 F | DIASTOLIC BLOOD PRESSURE: 83 MMHG | BODY MASS INDEX: 28.35 KG/M2

## 2021-06-26 LAB
BACTERIA #/AREA URNS HPF: NORMAL /HPF
BILIRUB UR QL STRIP: NEGATIVE
CLARITY UR: CLEAR
COLOR UR: YELLOW
GLUCOSE UR QL STRIP: NEGATIVE
HGB UR QL STRIP: NEGATIVE
KETONES UR QL STRIP: NEGATIVE
LEUKOCYTE ESTERASE UR QL STRIP: ABNORMAL
MICROSCOPIC COMMENT: NORMAL
NITRITE UR QL STRIP: NEGATIVE
PH UR STRIP: 8 [PH] (ref 5–8)
PROT UR QL STRIP: NEGATIVE
SP GR UR STRIP: 1.01 (ref 1–1.03)
SQUAMOUS #/AREA URNS HPF: 1 /HPF
URN SPEC COLLECT METH UR: ABNORMAL
UROBILINOGEN UR STRIP-ACNC: NEGATIVE EU/DL
WBC #/AREA URNS HPF: 4 /HPF (ref 0–5)

## 2021-06-26 PROCEDURE — 25500020 PHARM REV CODE 255: Performed by: EMERGENCY MEDICINE

## 2021-06-26 PROCEDURE — 81000 URINALYSIS NONAUTO W/SCOPE: CPT | Performed by: EMERGENCY MEDICINE

## 2021-06-26 RX ORDER — PREDNISONE 10 MG/1
10 TABLET ORAL DAILY
Qty: 21 TABLET | Refills: 0 | Status: SHIPPED | OUTPATIENT
Start: 2021-06-26 | End: 2022-12-06

## 2021-06-26 RX ORDER — PROMETHAZINE HYDROCHLORIDE 25 MG/1
25 TABLET ORAL EVERY 6 HOURS PRN
Qty: 15 TABLET | Refills: 0 | Status: SHIPPED | OUTPATIENT
Start: 2021-06-26 | End: 2022-12-06

## 2021-06-26 RX ADMIN — IOHEXOL 75 ML: 350 INJECTION, SOLUTION INTRAVENOUS at 12:06

## 2021-09-20 ENCOUNTER — IMMUNIZATION (OUTPATIENT)
Dept: INTERNAL MEDICINE | Facility: CLINIC | Age: 61
End: 2021-09-20
Payer: MEDICARE

## 2021-09-20 DIAGNOSIS — Z23 NEED FOR VACCINATION: Primary | ICD-10-CM

## 2021-09-20 PROCEDURE — 91300 COVID-19, MRNA, LNP-S, PF, 30 MCG/0.3 ML DOSE VACCINE: CPT | Mod: PBBFAC | Performed by: INTERNAL MEDICINE

## 2021-09-20 PROCEDURE — 0002A COVID-19, MRNA, LNP-S, PF, 30 MCG/0.3 ML DOSE VACCINE: CPT | Mod: PBBFAC | Performed by: INTERNAL MEDICINE

## 2022-12-06 ENCOUNTER — HOSPITAL ENCOUNTER (OUTPATIENT)
Facility: OTHER | Age: 62
Discharge: HOME | End: 2022-12-07
Attending: EMERGENCY MEDICINE | Admitting: HOSPITALIST
Payer: MEDICARE

## 2022-12-06 ENCOUNTER — ANESTHESIA EVENT (OUTPATIENT)
Dept: SURGERY | Facility: OTHER | Age: 62
End: 2022-12-06
Payer: MEDICARE

## 2022-12-06 DIAGNOSIS — N13.30 HYDRONEPHROSIS OF LEFT KIDNEY: ICD-10-CM

## 2022-12-06 DIAGNOSIS — E87.6 HYPOKALEMIA: Chronic | ICD-10-CM

## 2022-12-06 DIAGNOSIS — R52 INTRACTABLE PAIN: ICD-10-CM

## 2022-12-06 DIAGNOSIS — N13.2 HYDRONEPHROSIS WITH URINARY OBSTRUCTION DUE TO RENAL CALCULUS: ICD-10-CM

## 2022-12-06 DIAGNOSIS — R10.9 LEFT FLANK PAIN: Primary | ICD-10-CM

## 2022-12-06 DIAGNOSIS — N20.1 CALCULUS OF DISTAL LEFT URETER: ICD-10-CM

## 2022-12-06 DIAGNOSIS — N13.2 HYDRONEPHROSIS WITH URINARY OBSTRUCTION DUE TO URETERAL CALCULUS: ICD-10-CM

## 2022-12-06 DIAGNOSIS — F41.9 ANXIETY: Chronic | ICD-10-CM

## 2022-12-06 DIAGNOSIS — I10 ESSENTIAL HYPERTENSION: Chronic | ICD-10-CM

## 2022-12-06 LAB
ALBUMIN SERPL BCP-MCNC: 3.5 G/DL (ref 3.5–5.2)
ALP SERPL-CCNC: 69 U/L (ref 55–135)
ALT SERPL W/O P-5'-P-CCNC: 29 U/L (ref 10–44)
ANION GAP SERPL CALC-SCNC: 11 MMOL/L (ref 8–16)
AST SERPL-CCNC: 23 U/L (ref 10–40)
BACTERIA #/AREA URNS HPF: NORMAL /HPF
BASOPHILS # BLD AUTO: 0.03 K/UL (ref 0–0.2)
BASOPHILS NFR BLD: 0.3 % (ref 0–1.9)
BILIRUB SERPL-MCNC: 0.4 MG/DL (ref 0.1–1)
BILIRUB UR QL STRIP: NEGATIVE
BUN SERPL-MCNC: 12 MG/DL (ref 8–23)
CALCIUM SERPL-MCNC: 9.3 MG/DL (ref 8.7–10.5)
CHLORIDE SERPL-SCNC: 103 MMOL/L (ref 95–110)
CLARITY UR: CLEAR
CO2 SERPL-SCNC: 30 MMOL/L (ref 23–29)
COLOR UR: YELLOW
CREAT SERPL-MCNC: 1.2 MG/DL (ref 0.5–1.4)
DIFFERENTIAL METHOD: ABNORMAL
EOSINOPHIL # BLD AUTO: 0.3 K/UL (ref 0–0.5)
EOSINOPHIL NFR BLD: 2.4 % (ref 0–8)
ERYTHROCYTE [DISTWIDTH] IN BLOOD BY AUTOMATED COUNT: 14.2 % (ref 11.5–14.5)
EST. GFR  (NO RACE VARIABLE): 51 ML/MIN/1.73 M^2
GIANT PLATELETS BLD QL SMEAR: PRESENT
GLUCOSE SERPL-MCNC: 107 MG/DL (ref 70–110)
GLUCOSE UR QL STRIP: NEGATIVE
HCT VFR BLD AUTO: 35.8 % (ref 37–48.5)
HGB BLD-MCNC: 12 G/DL (ref 12–16)
HGB UR QL STRIP: NEGATIVE
IMM GRANULOCYTES # BLD AUTO: 0.03 K/UL (ref 0–0.04)
IMM GRANULOCYTES NFR BLD AUTO: 0.3 % (ref 0–0.5)
KETONES UR QL STRIP: NEGATIVE
LEUKOCYTE ESTERASE UR QL STRIP: ABNORMAL
LIPASE SERPL-CCNC: 31 U/L (ref 4–60)
LYMPHOCYTES # BLD AUTO: 4.1 K/UL (ref 1–4.8)
LYMPHOCYTES NFR BLD: 34.9 % (ref 18–48)
MCH RBC QN AUTO: 29.1 PG (ref 27–31)
MCHC RBC AUTO-ENTMCNC: 33.5 G/DL (ref 32–36)
MCV RBC AUTO: 87 FL (ref 82–98)
MICROSCOPIC COMMENT: NORMAL
MONOCYTES # BLD AUTO: 0.8 K/UL (ref 0.3–1)
MONOCYTES NFR BLD: 7.1 % (ref 4–15)
NEUTROPHILS # BLD AUTO: 6.5 K/UL (ref 1.8–7.7)
NEUTROPHILS NFR BLD: 55 % (ref 38–73)
NITRITE UR QL STRIP: NEGATIVE
NRBC BLD-RTO: 0 /100 WBC
PH UR STRIP: 8 [PH] (ref 5–8)
PLATELET # BLD AUTO: 433 K/UL (ref 150–450)
PLATELET BLD QL SMEAR: ABNORMAL
PMV BLD AUTO: 10.8 FL (ref 9.2–12.9)
POTASSIUM SERPL-SCNC: 3.2 MMOL/L (ref 3.5–5.1)
PROT SERPL-MCNC: 7.8 G/DL (ref 6–8.4)
PROT UR QL STRIP: NEGATIVE
RBC # BLD AUTO: 4.13 M/UL (ref 4–5.4)
RBC #/AREA URNS HPF: 4 /HPF (ref 0–4)
SODIUM SERPL-SCNC: 144 MMOL/L (ref 136–145)
SP GR UR STRIP: 1.02 (ref 1–1.03)
SQUAMOUS #/AREA URNS HPF: 2 /HPF
URN SPEC COLLECT METH UR: ABNORMAL
UROBILINOGEN UR STRIP-ACNC: NEGATIVE EU/DL
WBC # BLD AUTO: 11.79 K/UL (ref 3.9–12.7)
WBC #/AREA URNS HPF: 2 /HPF (ref 0–5)

## 2022-12-06 PROCEDURE — 83690 ASSAY OF LIPASE: CPT | Performed by: EMERGENCY MEDICINE

## 2022-12-06 PROCEDURE — 25000003 PHARM REV CODE 250: Performed by: EMERGENCY MEDICINE

## 2022-12-06 PROCEDURE — 96376 TX/PRO/DX INJ SAME DRUG ADON: CPT

## 2022-12-06 PROCEDURE — 96361 HYDRATE IV INFUSION ADD-ON: CPT

## 2022-12-06 PROCEDURE — 96375 TX/PRO/DX INJ NEW DRUG ADDON: CPT

## 2022-12-06 PROCEDURE — 99285 EMERGENCY DEPT VISIT HI MDM: CPT | Mod: 25

## 2022-12-06 PROCEDURE — 85025 COMPLETE CBC W/AUTO DIFF WBC: CPT | Performed by: EMERGENCY MEDICINE

## 2022-12-06 PROCEDURE — 99220 PR INITIAL OBSERVATION CARE,LEVL III: CPT | Mod: ,,, | Performed by: HOSPITALIST

## 2022-12-06 PROCEDURE — 99220 PR INITIAL OBSERVATION CARE,LEVL III: ICD-10-PCS | Mod: ,,, | Performed by: HOSPITALIST

## 2022-12-06 PROCEDURE — G0378 HOSPITAL OBSERVATION PER HR: HCPCS

## 2022-12-06 PROCEDURE — A4216 STERILE WATER/SALINE, 10 ML: HCPCS | Performed by: HOSPITALIST

## 2022-12-06 PROCEDURE — 25000003 PHARM REV CODE 250: Performed by: HOSPITALIST

## 2022-12-06 PROCEDURE — 99204 OFFICE O/P NEW MOD 45 MIN: CPT | Mod: ,,, | Performed by: UROLOGY

## 2022-12-06 PROCEDURE — 80053 COMPREHEN METABOLIC PANEL: CPT | Performed by: EMERGENCY MEDICINE

## 2022-12-06 PROCEDURE — 96365 THER/PROPH/DIAG IV INF INIT: CPT | Mod: 59

## 2022-12-06 PROCEDURE — 63600175 PHARM REV CODE 636 W HCPCS: Performed by: HOSPITALIST

## 2022-12-06 PROCEDURE — 63600175 PHARM REV CODE 636 W HCPCS: Performed by: EMERGENCY MEDICINE

## 2022-12-06 PROCEDURE — 81000 URINALYSIS NONAUTO W/SCOPE: CPT | Performed by: EMERGENCY MEDICINE

## 2022-12-06 PROCEDURE — 96365 THER/PROPH/DIAG IV INF INIT: CPT

## 2022-12-06 PROCEDURE — 99204 PR OFFICE/OUTPT VISIT, NEW, LEVL IV, 45-59 MIN: ICD-10-PCS | Mod: ,,, | Performed by: UROLOGY

## 2022-12-06 RX ORDER — POLYETHYLENE GLYCOL 3350 17 G/17G
17 POWDER, FOR SOLUTION ORAL 2 TIMES DAILY PRN
Status: DISCONTINUED | OUTPATIENT
Start: 2022-12-06 | End: 2022-12-07 | Stop reason: HOSPADM

## 2022-12-06 RX ORDER — ALPRAZOLAM 0.5 MG/1
1 TABLET ORAL 3 TIMES DAILY
Status: DISCONTINUED | OUTPATIENT
Start: 2022-12-06 | End: 2022-12-07 | Stop reason: HOSPADM

## 2022-12-06 RX ORDER — HYDROMORPHONE HYDROCHLORIDE 1 MG/ML
1 INJECTION, SOLUTION INTRAMUSCULAR; INTRAVENOUS; SUBCUTANEOUS EVERY 4 HOURS PRN
Status: DISCONTINUED | OUTPATIENT
Start: 2022-12-06 | End: 2022-12-07 | Stop reason: HOSPADM

## 2022-12-06 RX ORDER — PROMETHAZINE HYDROCHLORIDE 25 MG/1
25 TABLET ORAL EVERY 8 HOURS PRN
COMMUNITY
End: 2022-12-14 | Stop reason: SDUPTHER

## 2022-12-06 RX ORDER — NALOXONE HCL 0.4 MG/ML
0.02 VIAL (ML) INJECTION
Status: DISCONTINUED | OUTPATIENT
Start: 2022-12-06 | End: 2022-12-07 | Stop reason: HOSPADM

## 2022-12-06 RX ORDER — ZOLPIDEM TARTRATE 5 MG/1
10 TABLET ORAL NIGHTLY PRN
Status: DISCONTINUED | OUTPATIENT
Start: 2022-12-06 | End: 2022-12-07 | Stop reason: HOSPADM

## 2022-12-06 RX ORDER — ATORVASTATIN CALCIUM 10 MG/1
10 TABLET, FILM COATED ORAL DAILY
Status: DISCONTINUED | OUTPATIENT
Start: 2022-12-06 | End: 2022-12-07 | Stop reason: HOSPADM

## 2022-12-06 RX ORDER — ATORVASTATIN CALCIUM 10 MG/1
10 TABLET, FILM COATED ORAL DAILY
COMMUNITY

## 2022-12-06 RX ORDER — OXYCODONE HYDROCHLORIDE 5 MG/1
10 TABLET ORAL EVERY 6 HOURS PRN
Status: DISCONTINUED | OUTPATIENT
Start: 2022-12-06 | End: 2022-12-07 | Stop reason: HOSPADM

## 2022-12-06 RX ORDER — AMLODIPINE BESYLATE 10 MG/1
10 TABLET ORAL DAILY
COMMUNITY

## 2022-12-06 RX ORDER — SODIUM CHLORIDE 9 MG/ML
1000 INJECTION, SOLUTION INTRAVENOUS
Status: COMPLETED | OUTPATIENT
Start: 2022-12-06 | End: 2022-12-06

## 2022-12-06 RX ORDER — MAG HYDROX/ALUMINUM HYD/SIMETH 200-200-20
30 SUSPENSION, ORAL (FINAL DOSE FORM) ORAL ONCE
Status: COMPLETED | OUTPATIENT
Start: 2022-12-06 | End: 2022-12-06

## 2022-12-06 RX ORDER — GABAPENTIN 100 MG/1
200 CAPSULE ORAL NIGHTLY
Status: DISCONTINUED | OUTPATIENT
Start: 2022-12-06 | End: 2022-12-07 | Stop reason: HOSPADM

## 2022-12-06 RX ORDER — SODIUM CHLORIDE 0.9 % (FLUSH) 0.9 %
10 SYRINGE (ML) INJECTION EVERY 8 HOURS
Status: DISCONTINUED | OUTPATIENT
Start: 2022-12-06 | End: 2022-12-07 | Stop reason: HOSPADM

## 2022-12-06 RX ORDER — HYDROMORPHONE HYDROCHLORIDE 1 MG/ML
1 INJECTION, SOLUTION INTRAMUSCULAR; INTRAVENOUS; SUBCUTANEOUS
Status: COMPLETED | OUTPATIENT
Start: 2022-12-06 | End: 2022-12-06

## 2022-12-06 RX ORDER — LIDOCAINE HYDROCHLORIDE 20 MG/ML
15 SOLUTION OROPHARYNGEAL ONCE
Status: DISCONTINUED | OUTPATIENT
Start: 2022-12-06 | End: 2022-12-07 | Stop reason: HOSPADM

## 2022-12-06 RX ORDER — ACETAMINOPHEN 325 MG/1
650 TABLET ORAL EVERY 4 HOURS PRN
Status: DISCONTINUED | OUTPATIENT
Start: 2022-12-06 | End: 2022-12-07 | Stop reason: HOSPADM

## 2022-12-06 RX ORDER — POTASSIUM CHLORIDE 20 MEQ/1
40 TABLET, EXTENDED RELEASE ORAL
Status: COMPLETED | OUTPATIENT
Start: 2022-12-06 | End: 2022-12-06

## 2022-12-06 RX ORDER — ZOLPIDEM TARTRATE 10 MG/1
10 TABLET ORAL NIGHTLY
COMMUNITY

## 2022-12-06 RX ORDER — HYDROMORPHONE HYDROCHLORIDE 1 MG/ML
0.5 INJECTION, SOLUTION INTRAMUSCULAR; INTRAVENOUS; SUBCUTANEOUS
Status: COMPLETED | OUTPATIENT
Start: 2022-12-06 | End: 2022-12-06

## 2022-12-06 RX ORDER — CITALOPRAM 20 MG/1
20 TABLET, FILM COATED ORAL DAILY
Status: ON HOLD | COMMUNITY
End: 2023-05-25

## 2022-12-06 RX ORDER — ALPRAZOLAM 0.5 MG/1
1 TABLET ORAL 3 TIMES DAILY PRN
Status: DISCONTINUED | OUTPATIENT
Start: 2022-12-06 | End: 2022-12-06

## 2022-12-06 RX ORDER — SODIUM CHLORIDE, SODIUM LACTATE, POTASSIUM CHLORIDE, CALCIUM CHLORIDE 600; 310; 30; 20 MG/100ML; MG/100ML; MG/100ML; MG/100ML
INJECTION, SOLUTION INTRAVENOUS CONTINUOUS
Status: DISCONTINUED | OUTPATIENT
Start: 2022-12-06 | End: 2022-12-07 | Stop reason: HOSPADM

## 2022-12-06 RX ADMIN — ALPRAZOLAM 1 MG: 0.5 TABLET ORAL at 09:12

## 2022-12-06 RX ADMIN — HYDROMORPHONE HYDROCHLORIDE 1 MG: 1 INJECTION, SOLUTION INTRAMUSCULAR; INTRAVENOUS; SUBCUTANEOUS at 08:12

## 2022-12-06 RX ADMIN — SODIUM CHLORIDE 1000 ML: 0.9 INJECTION, SOLUTION INTRAVENOUS at 05:12

## 2022-12-06 RX ADMIN — HYDROMORPHONE HYDROCHLORIDE 1 MG: 1 INJECTION, SOLUTION INTRAMUSCULAR; INTRAVENOUS; SUBCUTANEOUS at 11:12

## 2022-12-06 RX ADMIN — HYDROMORPHONE HYDROCHLORIDE 0.5 MG: 0.5 INJECTION, SOLUTION INTRAMUSCULAR; INTRAVENOUS; SUBCUTANEOUS at 04:12

## 2022-12-06 RX ADMIN — HYDROMORPHONE HYDROCHLORIDE 1 MG: 1 INJECTION, SOLUTION INTRAMUSCULAR; INTRAVENOUS; SUBCUTANEOUS at 02:12

## 2022-12-06 RX ADMIN — Medication 10 ML: at 08:12

## 2022-12-06 RX ADMIN — PROMETHAZINE HYDROCHLORIDE 25 MG: 25 INJECTION INTRAMUSCULAR; INTRAVENOUS at 08:12

## 2022-12-06 RX ADMIN — PROMETHAZINE HYDROCHLORIDE 25 MG: 25 INJECTION INTRAMUSCULAR; INTRAVENOUS at 07:12

## 2022-12-06 RX ADMIN — HYDROMORPHONE HYDROCHLORIDE 1 MG: 1 INJECTION, SOLUTION INTRAMUSCULAR; INTRAVENOUS; SUBCUTANEOUS at 05:12

## 2022-12-06 RX ADMIN — HYDROMORPHONE HYDROCHLORIDE 1 MG: 1 INJECTION, SOLUTION INTRAMUSCULAR; INTRAVENOUS; SUBCUTANEOUS at 03:12

## 2022-12-06 RX ADMIN — OXYCODONE 10 MG: 5 TABLET ORAL at 09:12

## 2022-12-06 RX ADMIN — ALUMINUM HYDROXIDE, MAGNESIUM HYDROXIDE, AND SIMETHICONE 30 ML: 200; 200; 20 SUSPENSION ORAL at 04:12

## 2022-12-06 RX ADMIN — HYDROMORPHONE HYDROCHLORIDE 1 MG: 1 INJECTION, SOLUTION INTRAMUSCULAR; INTRAVENOUS; SUBCUTANEOUS at 06:12

## 2022-12-06 RX ADMIN — SODIUM CHLORIDE, SODIUM LACTATE, POTASSIUM CHLORIDE, AND CALCIUM CHLORIDE: 600; 310; 30; 20 INJECTION, SOLUTION INTRAVENOUS at 08:12

## 2022-12-06 RX ADMIN — SODIUM CHLORIDE 1000 ML: 0.9 INJECTION, SOLUTION INTRAVENOUS at 03:12

## 2022-12-06 RX ADMIN — ATORVASTATIN CALCIUM 10 MG: 10 TABLET, FILM COATED ORAL at 10:12

## 2022-12-06 RX ADMIN — ALPRAZOLAM 1 MG: 0.5 TABLET ORAL at 03:12

## 2022-12-06 RX ADMIN — POTASSIUM CHLORIDE 40 MEQ: 1500 TABLET, EXTENDED RELEASE ORAL at 04:12

## 2022-12-06 RX ADMIN — Medication 10 ML: at 02:12

## 2022-12-06 NOTE — ASSESSMENT & PLAN NOTE
Patient has chronic recurrent hypokalemia, takes supplements occasionally.  Potassium replaced for level 3.2, will monitor.

## 2022-12-06 NOTE — ED TRIAGE NOTES
Reports left flank pain with nausea, occasional emesis. (+) dysuria. Onset last week, pain worsened th last 3 days. Hx kidney stones, seen at another ED last week and dx with kidney stone, also hx of diverticulitis  - dx two months ago.

## 2022-12-06 NOTE — HPI
This is a 62 YOF with ulcerative colitis, anxiety, HTN who presented to the ER with left flank pain. She is admitted to hospital medicine under obs. Urology consulted for ureteral stone.    She was previously seen in the ED recently and discharged. Returned due to worsening left flank pain, nausea and vomiting.   On assessment, patient is afebrile, normotensive and Vital signs stable.    Labs 12/6/22: WBC 11.79 Hgb 12 Cr 1.2, baseline 1.0  UA 12/6/22: 4 RBC, 2 WBC, rare bacteria, 2 squamous cells    CT 12/6/22: Right pelviectasis, no hydronephrosis or right urolithiasis. Left mid ureteral 5 mm stone with proximal hydronephrosis.

## 2022-12-06 NOTE — HPI
Ms. Woodard is a 62 year old woman who presented with left flank and abdominal pain for one week that had worsened in the last 3 days.  No fever/chills, body aches.  She has dysuria but no hematuria or frequency.  She vomited after arrival in the ED but was not nauseated prior to that.  She has had alternating constipation and diarrhea.  CT renal stone study showed a 5 mm stone possibly in the distal left ureter and associated moderate left sided hydroureteronephrosis.  UA was negative for hematuria.  Labs notable for mildly low potassium level at 3.2.  Vital signs were normal.  She is being admitted for consultation with urology.    Review of chart in Care Everywhere shows she had a stent placed on 11/16 with Dr. Lerner for a 4 mm obstructing left UPJ stone with mild left hydronephrosis.  She was instructed to return the following day for stent removal, but after she returned home she developed severe pain and pulled out the stent herself, then presented to the ED in the evening due to continued pain.  Repeat CT showed resolution of the left UPJ stone but a new 5 mm left lower pole non-obstructing stone was seen that was new compared to the CT from 11/11.  This was felt to be displacement of the previously seen obstructing UPJ stone.  Patient's pain had stabilized, and as the stone was non-obstructing she was discharged home from the ED to follow up with Dr. Lerner in the AM but she did not follow up.    Medical history includes HTN, depression/anxiety, migraine headaches, ulcerative colitis, chronically low potassium level.  She has a remote history of smoking with quit date in 1976.  Her PCP is Dr. Daniela Pearce.

## 2022-12-06 NOTE — SUBJECTIVE & OBJECTIVE
Past Medical History:   Diagnosis Date    Asthma     Hypertension     Liver abscess     hx    Portal vein thrombosis 10/2013    Portal vein thrombosis 10/2013    Ulcerative colitis 9/15/2013       Past Surgical History:   Procedure Laterality Date    ABDOMINAL SURGERY      3 exploratory surgeries    APPENDECTOMY       SECTION, LOW TRANSVERSE      3 C-sections    CHOLECYSTECTOMY      COLONOSCOPY      COLONOSCOPY N/A 2016    Procedure: COLONOSCOPY;  Surgeon: Brad King MD;  Location: [address]);  Service: Endoscopy;  Laterality: N/A;    EXPLORATORY LAPAROTOMY W/ BOWEL RESECTION      HERNIA REPAIR      HYSTERECTOMY      INCISIONAL HERNIA REPAIR      UPPER GASTROINTESTINAL ENDOSCOPY         Review of patient's allergies indicates:   Allergen Reactions    Amlodipine Other (See Comments)     Patient says that she does not do well with generic amlodipine but does do well with Norvasc (amlodipine).    Bentyl [dicyclomine] Hives    Darvocet a500 [propoxyphene n-acetaminophen] Hives    Morphine Nausea And Vomiting    Toradol [ketorolac] Hives    Zofran [ondansetron hcl (pf)] Nausea And Vomiting    Versed [midazolam] Nausea And Vomiting       No current facility-administered medications on file prior to encounter.     Current Outpatient Medications on File Prior to Encounter   Medication Sig    ALPRAZolam (XANAX) 1 MG tablet Take 1 mg by mouth 3 (three) times daily as needed for Anxiety.    amLODIPine (NORVASC) 10 MG tablet Take 10 mg by mouth once daily.    atorvastatin (LIPITOR) 10 MG tablet Take 10 mg by mouth once daily.    citalopram (CELEXA) 20 MG tablet Take 20 mg by mouth once daily.    diphenhydrAMINE (BENADRYL) 25 mg capsule Take 25 mg by mouth every 6 (six) hours as needed for Itching.    diphenoxylate-atropine 2.5-0.025 mg (LOMOTIL) 2.5-0.025 mg per tablet Take 1 tablet by mouth 4 (four) times daily as needed for Diarrhea.    gabapentin (NEURONTIN) 100 MG capsule 200 mg every  evening.    multivitamin (THERAGRAN) per tablet Take 1 tablet by mouth once daily.     omeprazole (PRILOSEC) 20 MG capsule Take 20 mg by mouth 2 (two) times daily.     promethazine (PHENERGAN) 25 MG tablet Take 25 mg by mouth every 8 (eight) hours as needed for Nausea.    zolpidem (AMBIEN) 10 mg Tab Take 10 mg by mouth every evening.    [DISCONTINUED] predniSONE (DELTASONE) 10 MG tablet Take 1 tablet (10 mg total) by mouth once daily. Take 4 tabs x 3 days, then  Take 2 tabs x 3 days, then   Take 1 tab x 3 days.    [DISCONTINUED] promethazine (PHENERGAN) 25 MG tablet Take 1 tablet (25 mg total) by mouth every 6 (six) hours as needed for Nausea.    [DISCONTINUED] promethazine (PHENERGAN) 25 MG tablet Take 1 tablet (25 mg total) by mouth every 6 (six) hours as needed for Nausea.     Family History       Problem Relation (Age of Onset)    Breast cancer Maternal Aunt, Cousin    Cirrhosis Maternal Aunt    Colon cancer Maternal Uncle    Hypertension Maternal Grandmother          Tobacco Use    Smoking status: Former     Packs/day: 0.10     Years: 0.50     Pack years: 0.05     Types: Cigarettes     Quit date: 1976     Years since quittin.2    Smokeless tobacco: Never   Substance and Sexual Activity    Alcohol use: Yes     Alcohol/week: 1.0 standard drink     Types: 1 Glasses of wine per week     Comment: rare    Drug use: No    Sexual activity: Never     Birth control/protection: Abstinence     Review of Systems   Constitutional:  Positive for appetite change. Negative for chills and fever.   HENT:  Negative for rhinorrhea and sore throat.    Eyes:  Negative for photophobia and visual disturbance.   Respiratory:  Negative for cough and shortness of breath.    Cardiovascular:  Negative for chest pain and palpitations.   Gastrointestinal:  Positive for constipation, diarrhea, nausea and vomiting.   Endocrine: Negative for polydipsia and polyuria.   Genitourinary:  Positive for dysuria and flank pain. Negative for  frequency.   Musculoskeletal:  Negative for back pain and gait problem.   Neurological:  Negative for dizziness, weakness and headaches.   Psychiatric/Behavioral:  Negative for confusion and dysphoric mood.    Objective:     Vital Signs (Most Recent):  Temp: 97.9 °F (36.6 °C) (12/06/22 0252)  Pulse: 86 (12/06/22 0633)  Resp: 16 (12/06/22 0805)  BP: (!) 133/97 (12/06/22 0633)  SpO2: 98 % (12/06/22 0633)   Vital Signs (24h Range):  Temp:  [97.9 °F (36.6 °C)] 97.9 °F (36.6 °C)  Pulse:  [84-97] 86  Resp:  [16-22] 16  SpO2:  [90 %-98 %] 98 %  BP: (118-144)/(65-97) 133/97     Weight: 71.7 kg (158 lb)  Body mass index is 27.99 kg/m².    Physical Exam  Constitutional:       General: She is in acute distress.      Appearance: She is ill-appearing.   HENT:      Head: Normocephalic.   Eyes:      Extraocular Movements: Extraocular movements intact.      Conjunctiva/sclera: Conjunctivae normal.      Pupils: Pupils are equal, round, and reactive to light.   Cardiovascular:      Rate and Rhythm: Normal rate and regular rhythm.      Heart sounds: No murmur heard.    No gallop.   Pulmonary:      Effort: Pulmonary effort is normal.      Breath sounds: Normal breath sounds.   Abdominal:      General: Bowel sounds are normal.      Palpations: Abdomen is soft.      Tenderness: There is guarding.      Comments: Left flank pain elicited on palpation.   Musculoskeletal:         General: Normal range of motion.      Cervical back: Normal range of motion and neck supple.   Skin:     General: Skin is warm and dry.   Neurological:      General: No focal deficit present.      Mental Status: She is alert and oriented to person, place, and time.   Psychiatric:         Mood and Affect: Mood normal.         Behavior: Behavior normal.         CRANIAL NERVES     CN III, IV, VI   Pupils are equal, round, and reactive to light.     Significant Labs: All pertinent labs within the past 24 hours have been reviewed.    Significant Imaging: I have reviewed  all pertinent imaging results/findings within the past 24 hours.

## 2022-12-06 NOTE — CONSULTS
[facility], [place] Emergency Dept  Urology  Consult Note    Patient Name: Rosmery Woodard  MRN: [id number]  Admission Date: 2022  Hospital Length of Stay: 0   Code Status: Full Code   Attending Provider: Nia Chew MD   Consulting Provider: Jd Rios MD  Primary Care Physician: Daniela Pearce MD  Principal Problem:Hydronephrosis with urinary obstruction due to ureteral calculus    Inpatient consult to Urology  Consult performed by: Jd Rios MD  Consult ordered by: Nia Chew MD  Reason for consult: Left ureteral stone        Subjective:     HPI:  This is a 62 YOF with ulcerative colitis, anxiety, HTN who presented to the ER with left flank pain. She is admitted to hospital medicine under obs. Urology consulted for ureteral stone.    She was previously seen in the ED recently and discharged. Returned due to worsening left flank pain, nausea and vomiting.   On assessment, patient is afebrile, normotensive and Vital signs stable.    Labs 22: WBC 11.79 Hgb 12 Cr 1.2, baseline 1.0  UA 22: 4 RBC, 2 WBC, rare bacteria, 2 squamous cells    CT 22: Right pelviectasis, no hydronephrosis or right urolithiasis. Left mid ureteral 5 mm stone with proximal hydronephrosis.      Past Medical History:   Diagnosis Date    Asthma     Hypertension     Liver abscess     hx    Portal vein thrombosis 10/2013    Portal vein thrombosis 10/2013    Ulcerative colitis 9/15/2013       Past Surgical History:   Procedure Laterality Date    ABDOMINAL SURGERY      3 exploratory surgeries    APPENDECTOMY       SECTION, LOW TRANSVERSE      3 C-sections    CHOLECYSTECTOMY      COLONOSCOPY      COLONOSCOPY N/A 2016    Procedure: COLONOSCOPY;  Surgeon: Brad King MD;  Location: [address];  Service: Endoscopy;  Laterality: N/A;    EXPLORATORY LAPAROTOMY W/ BOWEL RESECTION      HERNIA REPAIR      HYSTERECTOMY      INCISIONAL HERNIA REPAIR      UPPER  GASTROINTESTINAL ENDOSCOPY         Review of patient's allergies indicates:   Allergen Reactions    Amlodipine Other (See Comments)     Patient says that she does not do well with generic amlodipine but does do well with Norvasc (amlodipine).    Bentyl [dicyclomine] Hives    Darvocet a500 [propoxyphene n-acetaminophen] Hives    Morphine Nausea And Vomiting    Toradol [ketorolac] Hives    Zofran [ondansetron hcl (pf)] Nausea And Vomiting    Versed [midazolam] Nausea And Vomiting       Family History       Problem Relation (Age of Onset)    Breast cancer Maternal Aunt, Cousin    Cirrhosis Maternal Aunt    Colon cancer Maternal Uncle    Hypertension Maternal Grandmother            Tobacco Use    Smoking status: Former     Packs/day: 0.10     Years: 0.50     Pack years: 0.05     Types: Cigarettes     Quit date: 1976     Years since quittin.2    Smokeless tobacco: Never   Substance and Sexual Activity    Alcohol use: Yes     Alcohol/week: 1.0 standard drink     Types: 1 Glasses of wine per week     Comment: rare    Drug use: No    Sexual activity: Never     Birth control/protection: Abstinence       Review of Systems   Constitutional:  Negative for activity change, chills, diaphoresis and fever.   HENT: Negative.     Eyes: Negative.    Respiratory: Negative.  Negative for shortness of breath.    Cardiovascular: Negative.  Negative for chest pain.   Gastrointestinal:  Positive for nausea and vomiting. Negative for constipation and diarrhea.   Genitourinary:  Positive for flank pain. Negative for difficulty urinating, dysuria, frequency and hematuria.   Skin: Negative.    Neurological: Negative.    Psychiatric/Behavioral: Negative.       Objective:     Temp:  [97.9 °F (36.6 °C)] 97.9 °F (36.6 °C)  Pulse:  [80-97] 80  Resp:  [16-22] 16  SpO2:  [90 %-100 %] 97 %  BP: (111-144)/(64-97) 122/72     Body mass index is 27.99 kg/m².           Drains       None                   Physical Exam  Vitals and  nursing note reviewed.   Constitutional:       General: She is not in acute distress.  HENT:      Head: Atraumatic.      Nose: Nose normal.   Eyes:      Extraocular Movements: Extraocular movements intact.   Cardiovascular:      Rate and Rhythm: Normal rate.   Pulmonary:      Effort: Pulmonary effort is normal.   Abdominal:      General: Abdomen is flat. There is no distension.      Tenderness: There is no abdominal tenderness. There is left CVA tenderness. There is no right CVA tenderness.      Comments: Midline incision well-healed   Musculoskeletal:         General: Normal range of motion.      Cervical back: Normal range of motion.   Skin:     Coloration: Skin is not jaundiced.   Neurological:      General: No focal deficit present.      Mental Status: She is alert and oriented to person, place, and time.   Psychiatric:         Mood and Affect: Mood normal.         Behavior: Behavior normal.       Significant Labs:    BMP:  Recent Labs   Lab 12/06/22  0310      K 3.2*      CO2 30*   BUN 12   CREATININE 1.2   CALCIUM 9.3       CBC:  Recent Labs   Lab 12/06/22  0310   WBC 11.79   HGB 12.0   HCT 35.8*          Urine Studies:   Recent Labs   Lab 12/06/22  0444   COLORU Yellow   APPEARANCEUA Clear   PHUR 8.0   SPECGRAV 1.020   PROTEINUA Negative   GLUCUA Negative   KETONESU Negative   BILIRUBINUA Negative   OCCULTUA Negative   NITRITE Negative   UROBILINOGEN Negative   LEUKOCYTESUR Trace*   RBCUA 4   WBCUA 2   BACTERIA Rare   SQUAMEPITHEL 2       Significant Imaging:  CT: I have reviewed all results within the past 24 hours and my personal findings are:  As per HPI.        Assessment and Plan:     * Hydronephrosis with urinary obstruction due to ureteral calculus  Ms. Woodard is a 63 yo female with the above comorbidities. Urology consulted for left mid ureteral stone.    - Admitted to hospital medicine  - NPO midnight, okay for diet today  - Will take to OR tomorrow 12/6 for left ureteroscopy,  laser lithotripsy  - Will obtain consent  - Nausea and pain control per primary  - rest of care per primary        VTE Risk Mitigation (From admission, onward)         Ordered     IP VTE LOW RISK PATIENT  Once         12/06/22 0659     Place sequential compression device  Until discontinued         12/06/22 0659                Thank you for your consult. I will follow-up with patient. Please contact us if you have any additional questions.    Jd Rios MD  Urology  [facility] - Emergency Dept

## 2022-12-06 NOTE — SUBJECTIVE & OBJECTIVE
Past Medical History:   Diagnosis Date    Asthma     Hypertension     Liver abscess     hx    Portal vein thrombosis 10/2013    Portal vein thrombosis 10/2013    Ulcerative colitis 9/15/2013       Past Surgical History:   Procedure Laterality Date    ABDOMINAL SURGERY      3 exploratory surgeries    APPENDECTOMY       SECTION, LOW TRANSVERSE      3 C-sections    CHOLECYSTECTOMY      COLONOSCOPY      COLONOSCOPY N/A 2016    Procedure: COLONOSCOPY;  Surgeon: Brad King MD;  Location: [address]);  Service: Endoscopy;  Laterality: N/A;    EXPLORATORY LAPAROTOMY W/ BOWEL RESECTION      HERNIA REPAIR      HYSTERECTOMY      INCISIONAL HERNIA REPAIR      UPPER GASTROINTESTINAL ENDOSCOPY         Review of patient's allergies indicates:   Allergen Reactions    Amlodipine Other (See Comments)     Patient says that she does not do well with generic amlodipine but does do well with Norvasc (amlodipine).    Bentyl [dicyclomine] Hives    Darvocet a500 [propoxyphene n-acetaminophen] Hives    Morphine Nausea And Vomiting    Toradol [ketorolac] Hives    Zofran [ondansetron hcl (pf)] Nausea And Vomiting    Versed [midazolam] Nausea And Vomiting       Family History       Problem Relation (Age of Onset)    Breast cancer Maternal Aunt, Cousin    Cirrhosis Maternal Aunt    Colon cancer Maternal Uncle    Hypertension Maternal Grandmother            Tobacco Use    Smoking status: Former     Packs/day: 0.10     Years: 0.50     Pack years: 0.05     Types: Cigarettes     Quit date: 1976     Years since quittin.2    Smokeless tobacco: Never   Substance and Sexual Activity    Alcohol use: Yes     Alcohol/week: 1.0 standard drink     Types: 1 Glasses of wine per week     Comment: rare    Drug use: No    Sexual activity: Never     Birth control/protection: Abstinence       Review of Systems   Constitutional:  Negative for activity change, chills, diaphoresis and fever.   HENT: Negative.     Eyes:  Negative.    Respiratory: Negative.  Negative for shortness of breath.    Cardiovascular: Negative.  Negative for chest pain.   Gastrointestinal:  Positive for nausea and vomiting. Negative for constipation and diarrhea.   Genitourinary:  Positive for flank pain. Negative for difficulty urinating, dysuria, frequency and hematuria.   Skin: Negative.    Neurological: Negative.    Psychiatric/Behavioral: Negative.       Objective:     Temp:  [97.9 °F (36.6 °C)] 97.9 °F (36.6 °C)  Pulse:  [80-97] 80  Resp:  [16-22] 16  SpO2:  [90 %-100 %] 97 %  BP: (111-144)/(64-97) 122/72     Body mass index is 27.99 kg/m².           Drains       None                   Physical Exam  Vitals and nursing note reviewed.   Constitutional:       General: She is not in acute distress.  HENT:      Head: Atraumatic.      Nose: Nose normal.   Eyes:      Extraocular Movements: Extraocular movements intact.   Cardiovascular:      Rate and Rhythm: Normal rate.   Pulmonary:      Effort: Pulmonary effort is normal.   Abdominal:      General: Abdomen is flat. There is no distension.      Tenderness: There is no abdominal tenderness. There is left CVA tenderness. There is no right CVA tenderness.      Comments: Midline incision well-healed   Musculoskeletal:         General: Normal range of motion.      Cervical back: Normal range of motion.   Skin:     Coloration: Skin is not jaundiced.   Neurological:      General: No focal deficit present.      Mental Status: She is alert and oriented to person, place, and time.   Psychiatric:         Mood and Affect: Mood normal.         Behavior: Behavior normal.       Significant Labs:    BMP:  Recent Labs   Lab 12/06/22  0310      K 3.2*      CO2 30*   BUN 12   CREATININE 1.2   CALCIUM 9.3       CBC:  Recent Labs   Lab 12/06/22  0310   WBC 11.79   HGB 12.0   HCT 35.8*          Urine Studies:   Recent Labs   Lab 12/06/22  0444   COLORU Yellow   APPEARANCEUA Clear   PHUR 8.0   SPECGRAV 1.020    PROTEINUA Negative   GLUCUA Negative   KETONESU Negative   BILIRUBINUA Negative   OCCULTUA Negative   NITRITE Negative   UROBILINOGEN Negative   LEUKOCYTESUR Trace*   RBCUA 4   WBCUA 2   BACTERIA Rare   SQUAMEPITHEL 2       Significant Imaging:  CT: I have reviewed all results within the past 24 hours and my personal findings are:  As per HPI.

## 2022-12-06 NOTE — ASSESSMENT & PLAN NOTE
Patient on Norvasc at home (says amlodipine is ineffective).  Currently BP is low-normal so will hold off on treating with home medication for now as Norvasc not available.

## 2022-12-06 NOTE — ED PROVIDER NOTES
Encounter Date: 2022    SCRIBE #1 NOTE: IYovani, am scribing for, and in the presence of,  Kristyn Leonard MD.     History     Chief Complaint   Patient presents with    Abdominal Pain     LLQ pain x2 weeks.    Flank Pain     Left Flank pain.     Time seen by provider: 3:15 AM    This is a 62 y.o. female who presents with complaint of left flank and left-sided abdominal pain for the past week that has worsened in the past 3 days. The patient was seen at the [facility] ED on  and diagnosed with a kidney stone. She was given Tramadol for her pain and had a ureteral stent placed, which she removed about an hour after it was placed. She states she has not yet followed up with urology. The patient denies fever, chills, body aches, nausea, and vomiting. She reports dysuria but she denies hematuria and an increase in urinary frequency. She also notes that she has been alternating between feeling constipated and having diarrhea. This is the extent of the patient's complaints at this time.    The history is provided by the patient.   Review of patient's allergies indicates:   Allergen Reactions    Amlodipine Other (See Comments)     Patient says that she does not do well with generic amlodipine but does do well with Norvasc (amlodipine).    Bentyl [dicyclomine] Hives    Darvocet a500 [propoxyphene n-acetaminophen] Hives    Morphine Nausea And Vomiting    Toradol [ketorolac] Hives    Zofran [ondansetron hcl (pf)] Nausea And Vomiting    Versed [midazolam] Nausea And Vomiting     Past Medical History:   Diagnosis Date    Asthma     Hypertension     Liver abscess     hx    Portal vein thrombosis 10/2013    Portal vein thrombosis 10/2013    Ulcerative colitis 9/15/2013     Past Surgical History:   Procedure Laterality Date    ABDOMINAL SURGERY      3 exploratory surgeries    APPENDECTOMY       SECTION, LOW TRANSVERSE      3 C-sections    CHOLECYSTECTOMY      COLONOSCOPY      COLONOSCOPY N/A 2016     Procedure: COLONOSCOPY;  Surgeon: Brad King MD;  [facility] [place] [address]);  Service: Endoscopy;  Laterality: N/A;    EXPLORATORY LAPAROTOMY W/ BOWEL RESECTION      HERNIA REPAIR      HYSTERECTOMY      INCISIONAL HERNIA REPAIR      UPPER GASTROINTESTINAL ENDOSCOPY       Family History   Problem Relation Age of Onset    Hypertension Maternal Grandmother     Cirrhosis Maternal Aunt     Breast cancer Maternal Aunt     Colon cancer Maternal Uncle     Breast cancer Cousin     Celiac disease Neg Hx     Colon polyps Neg Hx     Crohn's disease Neg Hx     Cystic fibrosis Neg Hx     Esophageal cancer Neg Hx     Hemochromatosis Neg Hx     Inflammatory bowel disease Neg Hx     Irritable bowel syndrome Neg Hx     Liver cancer Neg Hx     Liver disease Neg Hx     Rectal cancer Neg Hx     Stomach cancer Neg Hx     Ulcerative colitis Neg Hx     Scott's disease Neg Hx      Social History     Tobacco Use    Smoking status: Former     Packs/day: 0.10     Years: 0.50     Pack years: 0.05     Types: Cigarettes     Quit date: 1976     Years since quittin.2    Smokeless tobacco: Never   Substance Use Topics    Alcohol use: Yes     Alcohol/week: 1.0 standard drink     Types: 1 Glasses of wine per week     Comment: rare    Drug use: No     Review of Systems   Constitutional:  Negative for activity change, appetite change, chills, diaphoresis and fever.   HENT:  Negative for congestion, sore throat and trouble swallowing.    Eyes:  Negative for photophobia and visual disturbance.   Respiratory:  Negative for cough, chest tightness and shortness of breath.    Cardiovascular:  Negative for chest pain.   Gastrointestinal:  Positive for abdominal pain (left-sided), constipation and diarrhea. Negative for nausea and vomiting.   Endocrine: Negative for polydipsia and polyuria.   Genitourinary:  Positive for dysuria and flank pain (left). Negative for difficulty urinating, frequency and hematuria.   Musculoskeletal:   Negative for back pain and neck pain.   Skin:  Negative for rash.   Neurological:  Negative for weakness and headaches.   Psychiatric/Behavioral:  Negative for confusion.    All other systems reviewed and are negative.    Physical Exam     Initial Vitals [12/06/22 0252]   BP Pulse Resp Temp SpO2   (!) 143/93 97 17 97.9 °F (36.6 °C) 98 %      MAP       --         Physical Exam    Nursing note and vitals reviewed.  Constitutional: She appears well-developed and well-nourished. She does not have a sickly appearance. No distress.   HENT:   Head: Normocephalic and atraumatic.   Eyes: Conjunctivae and lids are normal. Right conjunctiva is not injected. Right conjunctiva has no hemorrhage. Left conjunctiva is not injected. Left conjunctiva has no hemorrhage. No scleral icterus.   Neck: Phonation normal. No stridor present. No tracheal deviation present.   Normal range of motion.  Cardiovascular:  Normal rate, regular rhythm and normal heart sounds.     Exam reveals no friction rub.       No murmur heard.  Pulmonary/Chest: Breath sounds normal. No respiratory distress. She has no wheezes. She has no rhonchi. She has no rales.   Abdominal: Abdomen is soft. She exhibits no distension. There is abdominal tenderness.   Mild diffuse abdominal tenderness to palpation.    Musculoskeletal:      Cervical back: Normal range of motion.     Neurological: She is alert and oriented to person, place, and time. She has normal strength.   Skin: Skin is warm.   Psychiatric: She has a normal mood and affect. Her speech is normal and behavior is normal. Judgment and thought content normal. Cognition and memory are normal.       ED Course   Procedures  Labs Reviewed   URINALYSIS, REFLEX TO URINE CULTURE - Abnormal; Notable for the following components:       Result Value    Leukocytes, UA Trace (*)     All other components within normal limits    Narrative:     Specimen Source->Urine   CBC W/ AUTO DIFFERENTIAL - Abnormal; Notable for the  following components:    Hematocrit 35.8 (*)     All other components within normal limits   COMPREHENSIVE METABOLIC PANEL - Abnormal; Notable for the following components:    Potassium 3.2 (*)     CO2 30 (*)     eGFR 51 (*)     All other components within normal limits   LIPASE   URINALYSIS MICROSCOPIC    Narrative:     Specimen Source->Urine          Imaging Results               CT Renal Stone Study ABD Pelvis WO (Final result)  Result time 12/06/22 05:19:25      Final result by Sara Vaughan MD (12/06/22 05:19:25)                   Impression:      1. Moderate left hydroureteronephrosis noting there is a 5 mm calcific density in the left pelvis either adjacent to or possibly involving the distal right left ureter.  Correlation with urinalysis advised.  Comparison with any more recent available imaging exams could be beneficial.  Additionally, urological consultation and further evaluation with urogram can be performed as warranted.  Nonspecific left perinephric fat stranding.  2. Abnormal colonic wall thickening and inflammatory change involving the right colon/hepatic flexure with adjacent prominent/enlarged mesenteric lymph nodes.  Findings could relate to underlying colitis of infectious, inflammatory or ischemic etiologies although further evaluation with colonoscopy is advised following appropriate treatment/resolution of symptoms to exclude underlying neoplastic process.  3. Cholecystectomy, hysterectomy and aortic atherosclerosis.  4. Additional findings as above.  This report was flagged in Epic as abnormal.      Electronically signed by: Sara Vaughan MD  Date:    12/06/2022  Time:    05:19               Narrative:    EXAMINATION:  CT RENAL STONE STUDY ABD PELVIS WO    CLINICAL HISTORY:  Nephrolithiasis, symptomatic/complicated;    TECHNIQUE:  Low dose axial images, sagittal and coronal reformations were obtained from the lung bases to the pubic symphysis.  Contrast was not  administered.    COMPARISON:  CT abdomen and pelvis 06/26/2021    FINDINGS:  The visualized lung bases demonstrate dependent bibasilar atelectasis.  The visualized portions of the heart and pericardium are within normal limits.    Please note evaluation of solid organ parenchyma is limited due to lack of IV contrast.  Additionally, the hepatic dome is not included in the field of view on axial images.  The visualized portions of the liver demonstrate an unremarkable noncontrast CT appearance.  The gallbladder is surgically absent.  The spleen, pancreas and adrenal glands demonstrate an unremarkable noncontrast CT appearance.    The kidneys normal in size and location.  There is moderate left hydroureteronephrosis.  There is a 5 mm calcific density possibly involving or adjacent to the distal left ureter noting there are multiple phleboliths within the abdomen and pelvis.  Correlation with urinalysis, urological consultation further evaluation with urogram as warranted.  There is small right extrarenal pelvis present.  No overt right hydronephrosis.  Multiple calcifications throughout the abdomen and pelvis are presumably phleboliths.  Nonspecific slight left perinephric fat stranding which can be seen with infection or obstruction.  The uterus is surgically absent.  The bladder demonstrates smooth margins.    The abdominal aorta is nonaneurysmal with scattered atherosclerosis.  There are shotty periaortic lymph nodes present.    There is abnormal colonic wall thickening and inflammatory change involving the right colon/splenic flexure with multiple adjacent prominent and enlarged mesenteric lymph nodes (please see axial series 2, images 63-94).  Findings could relate to underlying colitis of infectious, inflammatory or ischemic etiologies.  Clinical correlation advised.  Further evaluation with colonoscopy is advised after appropriate treatment/resolution of symptoms to evaluate for any potential neoplastic process.   There is no ascites, free intraperitoneal air or portal venous gas.  The appendix is not definitively visualized.  Correlation with surgical history advised.    The visualized osseous structures are intact.  Extraperitoneal soft tissues are within normal limits.                                       Medications   0.9%  NaCl infusion (0 mLs Intravenous Stopped 12/6/22 0502)   HYDROmorphone injection 1 mg (1 mg Intravenous Given 12/6/22 0326)   potassium chloride SA CR tablet 40 mEq (40 mEq Oral Given 12/6/22 0443)   HYDROmorphone injection 0.5 mg (0.5 mg Intravenous Given 12/6/22 0443)   HYDROmorphone injection 1 mg (1 mg Intravenous Given 12/6/22 0549)   0.9%  NaCl infusion (1,000 mLs Intravenous New Bag 12/6/22 0549)     Medical Decision Making:   History:   Old Medical Records: I decided to obtain old medical records.  Clinical Tests:   Lab Tests: Ordered and Reviewed  Radiological Study: Reviewed and Ordered  Additional MDM:   Comments: 62-year-old female with known ureter stone presents complaining of left flank pain.  Workup significant for moderate hydronephrosis with a 5 mm stone in the distal ureter.  Renal function within normal limits.  UA negative for UTI.  IV fluids and Dilaudid given.  After a total of 2.5 mg of Dilaudid, the patient continues to report pain is 15/10.  Case has been discussed with Dr. Gallegos, and the patient will be admitted for pain control and Urology evaluation..      Scribe Attestation:   Scribe #1: I performed the above scribed service and the documentation accurately describes the services I performed. I attest to the accuracy of the note.          Physician Attestation for Scribe: I, Kristyn Leonard  , reviewed documentation as scribed in my presence, which is both accurate and complete.           Clinical Impression:   Final diagnoses:  [R10.9] Left flank pain (Primary)  [N13.30] Hydronephrosis of left kidney  [N20.1] Calculus of distal left ureter  [R52] Intractable pain      ED  Disposition Condition    Observation Stable                Kristyn Leonard MD  12/06/22 0676

## 2022-12-06 NOTE — ASSESSMENT & PLAN NOTE
Left sided distal ureteral stone noted after treatment of UPJ stone with stenting on 11/16.  Patient had removed stent hours after the procedure due to pain and subsequent to that the stone was noted to have moved to the distal ureter.  Since then she has not followed up and had pain she was able to manage at home until the last few days.  CT showed worsening hydronephrosis on the left compared to the previous CT, and more recently has pain and vomiting.  Last meal was >12 hours ago and she is currently NPO.  Urology consulted.

## 2022-12-06 NOTE — H&P
[facility] Medicine  History & Physical    Patient Name: Rosmery Woodard  MRN: [id number]  Patient Class: OP- Observation  Admission Date: 12/6/2022  Attending Physician: Nia Chew MD   Primary Care Provider: Daniela Pearce MD         Patient information was obtained from patient, past medical records and ER records.     Subjective:     Principal Problem:Hydronephrosis with urinary obstruction due to ureteral calculus    Chief Complaint:   Chief Complaint   Patient presents with    Abdominal Pain     LLQ pain x2 weeks.    Flank Pain     Left Flank pain.        HPI: Ms. Woodard is a 62 year old woman who presented with left flank and abdominal pain for one week that had worsened in the last 3 days.  No fever/chills, body aches.  She has dysuria but no hematuria or frequency.  She vomited after arrival in the ED but was not nauseated prior to that.  She has had alternating constipation and diarrhea.  CT renal stone study showed a 5 mm stone possibly in the distal left ureter and associated moderate left sided hydroureteronephrosis.  UA was negative for hematuria.  Labs notable for mildly low potassium level at 3.2.  Vital signs were normal.  She is being admitted for consultation with urology.    Review of chart in Care Everywhere shows she had a stent placed on 11/16 with Dr. Lerner for a 4 mm obstructing left UPJ stone with mild left hydronephrosis.  She was instructed to return the following day for stent removal, but after she returned home she developed severe pain and pulled out the stent herself, then presented to the ED in the evening due to continued pain.  Repeat CT showed resolution of the left UPJ stone but a new 5 mm left lower pole non-obstructing stone was seen that was new compared to the CT from 11/11.  This was felt to be displacement of the previously seen obstructing UPJ stone.  Patient's pain had stabilized, and as the stone was non-obstructing she was discharged  home from the ED to follow up with Dr. Lerner in the AM but she did not follow up.    Medical history includes HTN, depression/anxiety, migraine headaches, ulcerative colitis, chronically low potassium level.  She has a remote history of smoking with quit date in .  Her PCP is Dr. Daniela Pearce.      Past Medical History:   Diagnosis Date    Asthma     Hypertension     Liver abscess     hx    Portal vein thrombosis 10/2013    Portal vein thrombosis 10/2013    Ulcerative colitis 9/15/2013       Past Surgical History:   Procedure Laterality Date    ABDOMINAL SURGERY      3 exploratory surgeries    APPENDECTOMY       SECTION, LOW TRANSVERSE      3 C-sections    CHOLECYSTECTOMY      COLONOSCOPY      COLONOSCOPY N/A 2016    Procedure: COLONOSCOPY;  Surgeon: Brad King MD;  Location: [address]);  Service: Endoscopy;  Laterality: N/A;    EXPLORATORY LAPAROTOMY W/ BOWEL RESECTION      HERNIA REPAIR      HYSTERECTOMY      INCISIONAL HERNIA REPAIR      UPPER GASTROINTESTINAL ENDOSCOPY         Review of patient's allergies indicates:   Allergen Reactions    Amlodipine Other (See Comments)     Patient says that she does not do well with generic amlodipine but does do well with Norvasc (amlodipine).    Bentyl [dicyclomine] Hives    Darvocet a500 [propoxyphene n-acetaminophen] Hives    Morphine Nausea And Vomiting    Toradol [ketorolac] Hives    Zofran [ondansetron hcl (pf)] Nausea And Vomiting    Versed [midazolam] Nausea And Vomiting       No current facility-administered medications on file prior to encounter.     Current Outpatient Medications on File Prior to Encounter   Medication Sig    ALPRAZolam (XANAX) 1 MG tablet Take 1 mg by mouth 3 (three) times daily as needed for Anxiety.    amLODIPine (NORVASC) 10 MG tablet Take 10 mg by mouth once daily.    atorvastatin (LIPITOR) 10 MG tablet Take 10 mg by mouth once daily.    citalopram (CELEXA) 20 MG tablet  Take 20 mg by mouth once daily.    diphenhydrAMINE (BENADRYL) 25 mg capsule Take 25 mg by mouth every 6 (six) hours as needed for Itching.    diphenoxylate-atropine 2.5-0.025 mg (LOMOTIL) 2.5-0.025 mg per tablet Take 1 tablet by mouth 4 (four) times daily as needed for Diarrhea.    gabapentin (NEURONTIN) 100 MG capsule 200 mg every evening.    multivitamin (THERAGRAN) per tablet Take 1 tablet by mouth once daily.     omeprazole (PRILOSEC) 20 MG capsule Take 20 mg by mouth 2 (two) times daily.     promethazine (PHENERGAN) 25 MG tablet Take 25 mg by mouth every 8 (eight) hours as needed for Nausea.    zolpidem (AMBIEN) 10 mg Tab Take 10 mg by mouth every evening.    [DISCONTINUED] predniSONE (DELTASONE) 10 MG tablet Take 1 tablet (10 mg total) by mouth once daily. Take 4 tabs x 3 days, then  Take 2 tabs x 3 days, then   Take 1 tab x 3 days.    [DISCONTINUED] promethazine (PHENERGAN) 25 MG tablet Take 1 tablet (25 mg total) by mouth every 6 (six) hours as needed for Nausea.    [DISCONTINUED] promethazine (PHENERGAN) 25 MG tablet Take 1 tablet (25 mg total) by mouth every 6 (six) hours as needed for Nausea.     Family History       Problem Relation (Age of Onset)    Breast cancer Maternal Aunt, Cousin    Cirrhosis Maternal Aunt    Colon cancer Maternal Uncle    Hypertension Maternal Grandmother          Tobacco Use    Smoking status: Former     Packs/day: 0.10     Years: 0.50     Pack years: 0.05     Types: Cigarettes     Quit date: 1976     Years since quittin.2    Smokeless tobacco: Never   Substance and Sexual Activity    Alcohol use: Yes     Alcohol/week: 1.0 standard drink     Types: 1 Glasses of wine per week     Comment: rare    Drug use: No    Sexual activity: Never     Birth control/protection: Abstinence     Review of Systems   Constitutional:  Positive for appetite change. Negative for chills and fever.   HENT:  Negative for rhinorrhea and sore throat.    Eyes:  Negative for  photophobia and visual disturbance.   Respiratory:  Negative for cough and shortness of breath.    Cardiovascular:  Negative for chest pain and palpitations.   Gastrointestinal:  Positive for constipation, diarrhea, nausea and vomiting.   Endocrine: Negative for polydipsia and polyuria.   Genitourinary:  Positive for dysuria and flank pain. Negative for frequency.   Musculoskeletal:  Negative for back pain and gait problem.   Neurological:  Negative for dizziness, weakness and headaches.   Psychiatric/Behavioral:  Negative for confusion and dysphoric mood.    Objective:     Vital Signs (Most Recent):  Temp: 97.9 °F (36.6 °C) (12/06/22 0252)  Pulse: 86 (12/06/22 0633)  Resp: 16 (12/06/22 0805)  BP: (!) 133/97 (12/06/22 0633)  SpO2: 98 % (12/06/22 0633)   Vital Signs (24h Range):  Temp:  [97.9 °F (36.6 °C)] 97.9 °F (36.6 °C)  Pulse:  [84-97] 86  Resp:  [16-22] 16  SpO2:  [90 %-98 %] 98 %  BP: (118-144)/(65-97) 133/97     Weight: 71.7 kg (158 lb)  Body mass index is 27.99 kg/m².    Physical Exam  Constitutional:       General: She is in acute distress.      Appearance: She is ill-appearing.   HENT:      Head: Normocephalic.   Eyes:      Extraocular Movements: Extraocular movements intact.      Conjunctiva/sclera: Conjunctivae normal.      Pupils: Pupils are equal, round, and reactive to light.   Cardiovascular:      Rate and Rhythm: Normal rate and regular rhythm.      Heart sounds: No murmur heard.    No gallop.   Pulmonary:      Effort: Pulmonary effort is normal.      Breath sounds: Normal breath sounds.   Abdominal:      General: Bowel sounds are normal.      Palpations: Abdomen is soft.      Tenderness: There is guarding.      Comments: Left flank pain elicited on palpation.   Musculoskeletal:         General: Normal range of motion.      Cervical back: Normal range of motion and neck supple.   Skin:     General: Skin is warm and dry.   Neurological:      General: No focal deficit present.      Mental Status: She  "is alert and oriented to person, place, and time.   Psychiatric:         Mood and Affect: Mood normal.         Behavior: Behavior normal.         CRANIAL NERVES     CN III, IV, VI   Pupils are equal, round, and reactive to light.     Significant Labs: All pertinent labs within the past 24 hours have been reviewed.    Significant Imaging: I have reviewed all pertinent imaging results/findings within the past 24 hours.    Assessment/Plan:     * Hydronephrosis with urinary obstruction due to ureteral calculus  Left sided distal ureteral stone noted after treatment of UPJ stone with stenting on 11/16.  Patient had removed stent hours after the procedure due to pain and subsequent to that the stone was noted to have moved to the distal ureter.  Since then she has not followed up and had pain she was able to manage at home until the last few days.  CT showed worsening hydronephrosis on the left compared to the previous CT, and more recently has pain and vomiting.  Last meal was >12 hours ago and she is currently NPO.  Urology consulted.        Anxiety  History of anxiety and depression, on Celexa 20 mg daily and alprazolam 1 mg tid, ordered.      Essential hypertension  Patient on Norvasc at home (says amlodipine is ineffective).  Currently BP is low-normal so will hold off on treating with home medication for now as Norvasc not available.      Intractable pain  See "ureteral calculus."  She received Dilaudid in ED but IV had kinked and had to be replaced.  Dilaudid ordered for severe pain, Percocet for moderate.      Hypokalemia  Patient has chronic recurrent hypokalemia, takes supplements occasionally.  Potassium replaced for level 3.2, will monitor.        VTE Risk Mitigation (From admission, onward)         Ordered     IP VTE LOW RISK PATIENT  Once         12/06/22 0659     Place sequential compression device  Until discontinued         12/06/22 0659                   Nia Chow MD  Department of Hospital " Medicine   [facility] - Emergency Dept

## 2022-12-06 NOTE — ASSESSMENT & PLAN NOTE
"See "ureteral calculus."  She received Dilaudid in ED but IV had kinked and had to be replaced.  Dilaudid ordered for severe pain, Percocet for moderate.    "

## 2022-12-06 NOTE — ASSESSMENT & PLAN NOTE
Ms. Woodard is a 63 yo female with the above comorbidities. Urology consulted for left mid ureteral stone.    - Admitted to hospital medicine  - NPO midnight, okay for diet today  - Will take to OR tomorrow 12/6 for left ureteroscopy, laser lithotripsy  - Will obtain consent  - Nausea and pain control per primary  - rest of care per primary

## 2022-12-07 ENCOUNTER — ANESTHESIA (OUTPATIENT)
Dept: SURGERY | Facility: OTHER | Age: 62
End: 2022-12-07
Payer: MEDICARE

## 2022-12-07 VITALS
HEART RATE: 91 BPM | OXYGEN SATURATION: 98 % | TEMPERATURE: 97 F | SYSTOLIC BLOOD PRESSURE: 113 MMHG | HEIGHT: 63 IN | RESPIRATION RATE: 18 BRPM | BODY MASS INDEX: 28 KG/M2 | WEIGHT: 158 LBS | DIASTOLIC BLOOD PRESSURE: 67 MMHG

## 2022-12-07 DIAGNOSIS — N13.2 HYDRONEPHROSIS WITH URINARY OBSTRUCTION DUE TO URETERAL CALCULUS: Primary | ICD-10-CM

## 2022-12-07 LAB
ANION GAP SERPL CALC-SCNC: 7 MMOL/L (ref 8–16)
BUN SERPL-MCNC: 10 MG/DL (ref 8–23)
CALCIUM SERPL-MCNC: 8.9 MG/DL (ref 8.7–10.5)
CHLORIDE SERPL-SCNC: 107 MMOL/L (ref 95–110)
CO2 SERPL-SCNC: 29 MMOL/L (ref 23–29)
CREAT SERPL-MCNC: 1 MG/DL (ref 0.5–1.4)
EST. GFR  (NO RACE VARIABLE): >60 ML/MIN/1.73 M^2
GLUCOSE SERPL-MCNC: 98 MG/DL (ref 70–110)
MAGNESIUM SERPL-MCNC: 2 MG/DL (ref 1.6–2.6)
POTASSIUM SERPL-SCNC: 3.7 MMOL/L (ref 3.5–5.1)
SODIUM SERPL-SCNC: 143 MMOL/L (ref 136–145)

## 2022-12-07 PROCEDURE — C1758 CATHETER, URETERAL: HCPCS | Performed by: UROLOGY

## 2022-12-07 PROCEDURE — 99217 PR OBSERVATION CARE DISCHARGE: CPT | Mod: ,,, | Performed by: HOSPITALIST

## 2022-12-07 PROCEDURE — 25000003 PHARM REV CODE 250: Performed by: HOSPITALIST

## 2022-12-07 PROCEDURE — 99213 PR OFFICE/OUTPT VISIT, EST, LEVL III, 20-29 MIN: ICD-10-PCS | Mod: 25,,, | Performed by: UROLOGY

## 2022-12-07 PROCEDURE — 99213 OFFICE O/P EST LOW 20 MIN: CPT | Mod: 25,,, | Performed by: UROLOGY

## 2022-12-07 PROCEDURE — 52356 CYSTO/URETERO W/LITHOTRIPSY: CPT | Mod: LT,,, | Performed by: UROLOGY

## 2022-12-07 PROCEDURE — G0378 HOSPITAL OBSERVATION PER HR: HCPCS

## 2022-12-07 PROCEDURE — 36415 COLL VENOUS BLD VENIPUNCTURE: CPT | Performed by: HOSPITALIST

## 2022-12-07 PROCEDURE — 82365 CALCULUS SPECTROSCOPY: CPT | Performed by: UROLOGY

## 2022-12-07 PROCEDURE — 99217 PR OBSERVATION CARE DISCHARGE: ICD-10-PCS | Mod: ,,, | Performed by: HOSPITALIST

## 2022-12-07 PROCEDURE — 96361 HYDRATE IV INFUSION ADD-ON: CPT | Mod: 59

## 2022-12-07 PROCEDURE — A4216 STERILE WATER/SALINE, 10 ML: HCPCS | Performed by: HOSPITALIST

## 2022-12-07 PROCEDURE — 37000008 HC ANESTHESIA 1ST 15 MINUTES: Performed by: UROLOGY

## 2022-12-07 PROCEDURE — 80048 BASIC METABOLIC PNL TOTAL CA: CPT | Performed by: HOSPITALIST

## 2022-12-07 PROCEDURE — 94761 N-INVAS EAR/PLS OXIMETRY MLT: CPT

## 2022-12-07 PROCEDURE — 25000003 PHARM REV CODE 250: Performed by: ANESTHESIOLOGY

## 2022-12-07 PROCEDURE — 96376 TX/PRO/DX INJ SAME DRUG ADON: CPT

## 2022-12-07 PROCEDURE — 37000009 HC ANESTHESIA EA ADD 15 MINS: Performed by: UROLOGY

## 2022-12-07 PROCEDURE — 36000706: Performed by: UROLOGY

## 2022-12-07 PROCEDURE — 63600175 PHARM REV CODE 636 W HCPCS: Performed by: STUDENT IN AN ORGANIZED HEALTH CARE EDUCATION/TRAINING PROGRAM

## 2022-12-07 PROCEDURE — C1769 GUIDE WIRE: HCPCS | Performed by: UROLOGY

## 2022-12-07 PROCEDURE — 36000707: Performed by: UROLOGY

## 2022-12-07 PROCEDURE — 63600175 PHARM REV CODE 636 W HCPCS: Performed by: HOSPITALIST

## 2022-12-07 PROCEDURE — 52356 PR CYSTO/URETERO W/LITHOTRIPSY: ICD-10-PCS | Mod: LT,,, | Performed by: UROLOGY

## 2022-12-07 PROCEDURE — 25000003 PHARM REV CODE 250: Performed by: STUDENT IN AN ORGANIZED HEALTH CARE EDUCATION/TRAINING PROGRAM

## 2022-12-07 PROCEDURE — 27201423 OPTIME MED/SURG SUP & DEVICES STERILE SUPPLY: Performed by: UROLOGY

## 2022-12-07 PROCEDURE — 83735 ASSAY OF MAGNESIUM: CPT | Performed by: HOSPITALIST

## 2022-12-07 PROCEDURE — C2617 STENT, NON-COR, TEM W/O DEL: HCPCS | Performed by: UROLOGY

## 2022-12-07 PROCEDURE — 25000003 PHARM REV CODE 250: Performed by: UROLOGY

## 2022-12-07 PROCEDURE — 71000033 HC RECOVERY, INTIAL HOUR: Performed by: UROLOGY

## 2022-12-07 DEVICE — STENT URETERAL UNIV 6FR 22CM: Type: IMPLANTABLE DEVICE | Site: URETER | Status: FUNCTIONAL

## 2022-12-07 RX ORDER — EPHEDRINE SULFATE 50 MG/ML
INJECTION, SOLUTION INTRAVENOUS
Status: DISCONTINUED | OUTPATIENT
Start: 2022-12-07 | End: 2022-12-07

## 2022-12-07 RX ORDER — PROPOFOL 10 MG/ML
VIAL (ML) INTRAVENOUS
Status: DISCONTINUED | OUTPATIENT
Start: 2022-12-07 | End: 2022-12-07

## 2022-12-07 RX ORDER — OXYCODONE HYDROCHLORIDE 5 MG/1
5 TABLET ORAL
Status: DISCONTINUED | OUTPATIENT
Start: 2022-12-07 | End: 2022-12-07 | Stop reason: HOSPADM

## 2022-12-07 RX ORDER — SODIUM CHLORIDE 0.9 % (FLUSH) 0.9 %
3 SYRINGE (ML) INJECTION
Status: DISCONTINUED | OUTPATIENT
Start: 2022-12-07 | End: 2022-12-07 | Stop reason: HOSPADM

## 2022-12-07 RX ORDER — OXYBUTYNIN CHLORIDE 5 MG/1
5 TABLET ORAL 3 TIMES DAILY PRN
Qty: 90 TABLET | Refills: 11 | Status: SHIPPED | OUTPATIENT
Start: 2022-12-07 | End: 2023-01-06

## 2022-12-07 RX ORDER — ATROPA BELLADONNA AND OPIUM 16.2; 6 MG/1; MG/1
SUPPOSITORY RECTAL
Status: DISCONTINUED | OUTPATIENT
Start: 2022-12-07 | End: 2022-12-07 | Stop reason: HOSPADM

## 2022-12-07 RX ORDER — DEXAMETHASONE SODIUM PHOSPHATE 4 MG/ML
INJECTION, SOLUTION INTRA-ARTICULAR; INTRALESIONAL; INTRAMUSCULAR; INTRAVENOUS; SOFT TISSUE
Status: DISCONTINUED | OUTPATIENT
Start: 2022-12-07 | End: 2022-12-07

## 2022-12-07 RX ORDER — ATROPA BELLADONNA AND OPIUM 16.2; 6 MG/1; MG/1
60 SUPPOSITORY RECTAL
Status: DISCONTINUED | OUTPATIENT
Start: 2022-12-07 | End: 2022-12-07 | Stop reason: HOSPADM

## 2022-12-07 RX ORDER — OXYCODONE AND ACETAMINOPHEN 10; 325 MG/1; MG/1
1 TABLET ORAL EVERY 6 HOURS PRN
Qty: 25 TABLET | Refills: 0 | OUTPATIENT
Start: 2022-12-07 | End: 2022-12-14

## 2022-12-07 RX ORDER — OXYBUTYNIN CHLORIDE 5 MG/1
5 TABLET ORAL 3 TIMES DAILY
Status: DISCONTINUED | OUTPATIENT
Start: 2022-12-07 | End: 2022-12-07 | Stop reason: HOSPADM

## 2022-12-07 RX ORDER — FENTANYL CITRATE 50 UG/ML
INJECTION, SOLUTION INTRAMUSCULAR; INTRAVENOUS
Status: DISCONTINUED | OUTPATIENT
Start: 2022-12-07 | End: 2022-12-07

## 2022-12-07 RX ORDER — OXYCODONE AND ACETAMINOPHEN 10; 325 MG/1; MG/1
1 TABLET ORAL EVERY 6 HOURS PRN
Qty: 21 TABLET | Refills: 0 | Status: SHIPPED | OUTPATIENT
Start: 2022-12-07 | End: 2022-12-07 | Stop reason: SDUPTHER

## 2022-12-07 RX ORDER — TAMSULOSIN HYDROCHLORIDE 0.4 MG/1
0.4 CAPSULE ORAL NIGHTLY
Qty: 7 CAPSULE | Refills: 0 | Status: SHIPPED | OUTPATIENT
Start: 2022-12-07 | End: 2022-12-14

## 2022-12-07 RX ORDER — LIDOCAINE HYDROCHLORIDE 20 MG/ML
INJECTION INTRAVENOUS
Status: DISCONTINUED | OUTPATIENT
Start: 2022-12-07 | End: 2022-12-07

## 2022-12-07 RX ORDER — PROCHLORPERAZINE EDISYLATE 5 MG/ML
5 INJECTION INTRAMUSCULAR; INTRAVENOUS EVERY 30 MIN PRN
Status: DISCONTINUED | OUTPATIENT
Start: 2022-12-07 | End: 2022-12-07 | Stop reason: HOSPADM

## 2022-12-07 RX ORDER — PHENYLEPHRINE HYDROCHLORIDE 10 MG/ML
INJECTION INTRAVENOUS
Status: DISCONTINUED | OUTPATIENT
Start: 2022-12-07 | End: 2022-12-07

## 2022-12-07 RX ORDER — CIPROFLOXACIN 2 MG/ML
INJECTION, SOLUTION INTRAVENOUS
Status: DISCONTINUED | OUTPATIENT
Start: 2022-12-07 | End: 2022-12-07

## 2022-12-07 RX ORDER — HYDROMORPHONE HYDROCHLORIDE 2 MG/ML
0.4 INJECTION, SOLUTION INTRAMUSCULAR; INTRAVENOUS; SUBCUTANEOUS EVERY 5 MIN PRN
Status: DISCONTINUED | OUTPATIENT
Start: 2022-12-07 | End: 2022-12-07 | Stop reason: HOSPADM

## 2022-12-07 RX ORDER — TAMSULOSIN HYDROCHLORIDE 0.4 MG/1
0.4 CAPSULE ORAL DAILY
Status: DISCONTINUED | OUTPATIENT
Start: 2022-12-07 | End: 2022-12-07 | Stop reason: HOSPADM

## 2022-12-07 RX ORDER — DIPHENHYDRAMINE HYDROCHLORIDE 50 MG/ML
12.5 INJECTION INTRAMUSCULAR; INTRAVENOUS EVERY 30 MIN PRN
Status: DISCONTINUED | OUTPATIENT
Start: 2022-12-07 | End: 2022-12-07 | Stop reason: HOSPADM

## 2022-12-07 RX ADMIN — ATORVASTATIN CALCIUM 10 MG: 10 TABLET, FILM COATED ORAL at 08:12

## 2022-12-07 RX ADMIN — CIPROFLOXACIN 400 MG: 2 INJECTION, SOLUTION INTRAVENOUS at 10:12

## 2022-12-07 RX ADMIN — EPHEDRINE SULFATE 10 MG: 50 INJECTION INTRAVENOUS at 10:12

## 2022-12-07 RX ADMIN — ALPRAZOLAM 1 MG: 0.5 TABLET ORAL at 08:12

## 2022-12-07 RX ADMIN — ALPRAZOLAM 1 MG: 0.5 TABLET ORAL at 03:12

## 2022-12-07 RX ADMIN — Medication 10 ML: at 01:12

## 2022-12-07 RX ADMIN — TAMSULOSIN HYDROCHLORIDE 0.4 MG: 0.4 CAPSULE ORAL at 01:12

## 2022-12-07 RX ADMIN — PHENYLEPHRINE HYDROCHLORIDE 100 MCG: 10 INJECTION INTRAVENOUS at 10:12

## 2022-12-07 RX ADMIN — LIDOCAINE HYDROCHLORIDE 100 MG: 20 INJECTION, SOLUTION INTRAVENOUS at 10:12

## 2022-12-07 RX ADMIN — HYDROMORPHONE HYDROCHLORIDE 1 MG: 1 INJECTION, SOLUTION INTRAMUSCULAR; INTRAVENOUS; SUBCUTANEOUS at 05:12

## 2022-12-07 RX ADMIN — DEXAMETHASONE SODIUM PHOSPHATE 8 MG: 4 INJECTION, SOLUTION INTRAMUSCULAR; INTRAVENOUS at 10:12

## 2022-12-07 RX ADMIN — PROPOFOL 200 MG: 10 INJECTION, EMULSION INTRAVENOUS at 10:12

## 2022-12-07 RX ADMIN — EPHEDRINE SULFATE 15 MG: 50 INJECTION INTRAVENOUS at 10:12

## 2022-12-07 RX ADMIN — OXYBUTYNIN CHLORIDE 5 MG: 5 TABLET ORAL at 01:12

## 2022-12-07 RX ADMIN — GLYCOPYRROLATE 0.2 MG: 0.2 INJECTION, SOLUTION INTRAMUSCULAR; INTRAVITREAL at 10:12

## 2022-12-07 RX ADMIN — HYDROMORPHONE HYDROCHLORIDE 1 MG: 1 INJECTION, SOLUTION INTRAMUSCULAR; INTRAVENOUS; SUBCUTANEOUS at 01:12

## 2022-12-07 RX ADMIN — FENTANYL CITRATE 100 MCG: 50 INJECTION, SOLUTION INTRAMUSCULAR; INTRAVENOUS at 10:12

## 2022-12-07 RX ADMIN — SODIUM CHLORIDE, SODIUM LACTATE, POTASSIUM CHLORIDE, AND CALCIUM CHLORIDE: 600; 310; 30; 20 INJECTION, SOLUTION INTRAVENOUS at 10:12

## 2022-12-07 RX ADMIN — OXYCODONE 5 MG: 5 TABLET ORAL at 12:12

## 2022-12-07 RX ADMIN — PHENYLEPHRINE HYDROCHLORIDE 200 MCG: 10 INJECTION INTRAVENOUS at 10:12

## 2022-12-07 NOTE — HOSPITAL COURSE
Patient admitted and treated for nausea and pain while awaiting urology consultation.  Scheduled for ureteroscopy and definitive stone treatment 12/7.  She underwent left ureteroscopy with laser lithotripsy, cysto/pyeloscopy and left ureteral stent insertion on 12/7.  Stone fragments were sent for analysis.  Patient tolerated the procedure well with good pain relief and was sent home when she was tolerating a diet.  She was instructed to remove the stent in 5 days and will follow up with urology in one month.

## 2022-12-07 NOTE — PLAN OF CARE
Patient is AAOx4. Daughter at bedside. Up with stand by assist. Patient reported nausea and pain during the night and medications were administered per orders. Some relief obtained. NPO at midnight per orders. No acute events to note. Patient resting comfortably at present. Call light within reach of patient. Will continue purposeful rounding.

## 2022-12-07 NOTE — ASSESSMENT & PLAN NOTE
Patient on Norvasc at home (says amlodipine is ineffective).  Currently BP is low-normal so will hold off on treating with home medication for now as Norvasc brand not available.

## 2022-12-07 NOTE — TRANSFER OF CARE
"Anesthesia Transfer of Care Note    Patient: Rosmery Woodard    Procedure(s) Performed: Procedure(s) (LRB):  CYSTOURETEROSCOPY,WITH HOLMIUM LASER LITHOTRIPSY OF URETERAL CALCULUS (Left)  INSERTION, STENT, URETER (Left)    Patient location: PACU    Anesthesia Type: general    Transport from OR: Transported from OR on 6-10 L/min O2 by face mask with adequate spontaneous ventilation    Post pain: adequate analgesia    Post assessment: no apparent anesthetic complications and tolerated procedure well    Post vital signs: stable    Level of consciousness: sedated    Nausea/Vomiting: no nausea/vomiting    Complications: none    Transfer of care protocol was followed      Last vitals:   Visit Vitals  /64 (BP Location: Left arm, Patient Position: Lying)   Pulse 69   Temp 36.8 °C (98.3 °F) (Oral)   Resp 18   Ht 5' 3" (1.6 m)   Wt 71.7 kg (158 lb)   SpO2 96%   Breastfeeding No   BMI 27.99 kg/m²     "

## 2022-12-07 NOTE — PROGRESS NOTES
[facility] Surg ([address])  Urology  Progress Note    Patient Name: Rosmery Woodard  MRN: [id number]  Admission Date: 12/6/2022  Hospital Length of Stay: 0 days  Code Status: Full Code   Attending Provider: Nia Chew MD   Primary Care Physician: Daniela Pearce MD    Subjective:     HPI:  This is a 62 YOF with ulcerative colitis, anxiety, HTN who presented to the ER with left flank pain. She is admitted to hospital medicine under obs. Urology consulted for ureteral stone.    She was previously seen in the ED recently and discharged. Returned due to worsening left flank pain, nausea and vomiting.   On assessment, patient is afebrile, normotensive and Vital signs stable.    Labs 12/6/22: WBC 11.79 Hgb 12 Cr 1.2, baseline 1.0  UA 12/6/22: 4 RBC, 2 WBC, rare bacteria, 2 squamous cells    CT 12/6/22: Right pelviectasis, no hydronephrosis or right urolithiasis. Left mid ureteral 5 mm stone with proximal hydronephrosis.      Interval History: NAEO. AFVSS. Continued pain and nausea. NPO since midnight. Consented this am.    Review of Systems  Objective:     Temp:  [97.7 °F (36.5 °C)-98.3 °F (36.8 °C)] 97.7 °F (36.5 °C)  Pulse:  [64-80] 64  Resp:  [16-18] 18  SpO2:  [95 %-100 %] 95 %  BP: (111-147)/(58-84) 115/68     Body mass index is 27.99 kg/m².           Drains       None                   Physical Exam  Vitals and nursing note reviewed.   Constitutional:       General: She is not in acute distress.  HENT:      Head: Atraumatic.      Nose: Nose normal.   Eyes:      Extraocular Movements: Extraocular movements intact.   Cardiovascular:      Rate and Rhythm: Normal rate.   Pulmonary:      Effort: Pulmonary effort is normal.   Abdominal:      General: Abdomen is flat. There is no distension.      Tenderness: There is no abdominal tenderness. There is left CVA tenderness. There is no right CVA tenderness.      Comments: Midline incision well-healed   Musculoskeletal:         General: Normal range of motion.       Cervical back: Normal range of motion.   Skin:     Coloration: Skin is not jaundiced.   Neurological:      General: No focal deficit present.      Mental Status: She is alert and oriented to person, place, and time.   Psychiatric:         Mood and Affect: Mood normal.         Behavior: Behavior normal.       Significant Labs:    BMP:  Recent Labs   Lab 12/06/22  0310 12/07/22  0408    143   K 3.2* 3.7    107   CO2 30* 29   BUN 12 10   CREATININE 1.2 1.0   CALCIUM 9.3 8.9       CBC:   Recent Labs   Lab 12/06/22  0310   WBC 11.79   HGB 12.0   HCT 35.8*          Urine Studies:   Recent Labs   Lab 12/06/22  0444   COLORU Yellow   APPEARANCEUA Clear   PHUR 8.0   SPECGRAV 1.020   PROTEINUA Negative   GLUCUA Negative   KETONESU Negative   BILIRUBINUA Negative   OCCULTUA Negative   NITRITE Negative   UROBILINOGEN Negative   LEUKOCYTESUR Trace*   RBCUA 4   WBCUA 2   BACTERIA Rare   SQUAMEPITHEL 2       Significant Imaging:  All pertinent imaging results/findings from the past 24 hours have been reviewed.        Assessment/Plan:     * Hydronephrosis with urinary obstruction due to ureteral calculus  Ms. Woodard is a 61 yo female with the above comorbidities. Urology consulted for left mid ureteral stone.    - Admitted to hospital medicine  - NPO for surgery  - Will take to OR today 12/6 for left ureteroscopy, laser lithotripsy  - Consented  - Nausea and pain control per primary  - rest of care per primary    Dispo: To OR this morning        VTE Risk Mitigation (From admission, onward)         Ordered     IP VTE LOW RISK PATIENT  Once         12/06/22 0659     Place sequential compression device  Until discontinued         12/06/22 0659                Jd Rios MD  Urology  [facility] - Med Surg ([address])

## 2022-12-07 NOTE — PLAN OF CARE
12/07/22 1520   Final Note   Assessment Type Final Discharge Note   Anticipated Discharge Disposition Home   Hospital Resources/Appts/Education Provided Appointments scheduled and added to AVS   Post-Acute Status   Discharge Delays None known at this time     All follow up appointments have been scheduled.  Pt has a ride home and is clear for discharge from a case management perspective.

## 2022-12-07 NOTE — ASSESSMENT & PLAN NOTE
Patient has chronic recurrent hypokalemia, takes supplements occasionally.  Potassium replaced for level 3.2 and improved to 3.7 this morning, will monitor.

## 2022-12-07 NOTE — ASSESSMENT & PLAN NOTE
Left sided distal ureteral stone noted after treatment of UPJ stone with stenting on 11/16.  Patient had removed stent hours after the procedure due to pain and subsequent to that the stone was noted to have moved to the distal ureter.  Since then she has not followed up and had pain she was able to manage at home until the last few days.  CT showed worsening hydronephrosis on the left compared to the previous CT, and more recently has pain and vomiting.  Urology consulted and will do ureteroscopy and definitive stone treatment this morning.

## 2022-12-07 NOTE — PROGRESS NOTES
AVS reviewed with pt and daughter at bedside. Pt verbalizes understanding. PIV discontinued. Medications delivered to bedside. Transport requested. Pt to discharge home with daughter.

## 2022-12-07 NOTE — SUBJECTIVE & OBJECTIVE
Interval History: NAEO. AFVSS. Continued pain and nausea. NPO since midnight. Consented this am.    Review of Systems  Objective:     Temp:  [97.7 °F (36.5 °C)-98.3 °F (36.8 °C)] 97.7 °F (36.5 °C)  Pulse:  [64-80] 64  Resp:  [16-18] 18  SpO2:  [95 %-100 %] 95 %  BP: (111-147)/(58-84) 115/68     Body mass index is 27.99 kg/m².           Drains       None                   Physical Exam  Vitals and nursing note reviewed.   Constitutional:       General: She is not in acute distress.  HENT:      Head: Atraumatic.      Nose: Nose normal.   Eyes:      Extraocular Movements: Extraocular movements intact.   Cardiovascular:      Rate and Rhythm: Normal rate.   Pulmonary:      Effort: Pulmonary effort is normal.   Abdominal:      General: Abdomen is flat. There is no distension.      Tenderness: There is no abdominal tenderness. There is left CVA tenderness. There is no right CVA tenderness.      Comments: Midline incision well-healed   Musculoskeletal:         General: Normal range of motion.      Cervical back: Normal range of motion.   Skin:     Coloration: Skin is not jaundiced.   Neurological:      General: No focal deficit present.      Mental Status: She is alert and oriented to person, place, and time.   Psychiatric:         Mood and Affect: Mood normal.         Behavior: Behavior normal.       Significant Labs:    BMP:  Recent Labs   Lab 12/06/22  0310 12/07/22  0408    143   K 3.2* 3.7    107   CO2 30* 29   BUN 12 10   CREATININE 1.2 1.0   CALCIUM 9.3 8.9       CBC:   Recent Labs   Lab 12/06/22  0310   WBC 11.79   HGB 12.0   HCT 35.8*          Urine Studies:   Recent Labs   Lab 12/06/22  0444   COLORU Yellow   APPEARANCEUA Clear   PHUR 8.0   SPECGRAV 1.020   PROTEINUA Negative   GLUCUA Negative   KETONESU Negative   BILIRUBINUA Negative   OCCULTUA Negative   NITRITE Negative   UROBILINOGEN Negative   LEUKOCYTESUR Trace*   RBCUA 4   WBCUA 2   BACTERIA Rare   SQUAMEPITHEL 2       Significant  Imaging:  All pertinent imaging results/findings from the past 24 hours have been reviewed.

## 2022-12-07 NOTE — ASSESSMENT & PLAN NOTE
Ms. Woodard is a 61 yo female with the above comorbidities. Urology consulted for left mid ureteral stone.    - Admitted to hospital medicine  - NPO for surgery  - Will take to OR today 12/6 for left ureteroscopy, laser lithotripsy  - Consented  - Nausea and pain control per primary  - rest of care per primary    Dispo: To OR this morning

## 2022-12-07 NOTE — ANESTHESIA POSTPROCEDURE EVALUATION
Anesthesia Post Evaluation    Patient: Rosmery Woodard    Procedure(s) Performed: Procedure(s) (LRB):  CYSTOURETEROSCOPY,WITH HOLMIUM LASER LITHOTRIPSY OF URETERAL CALCULUS (Left)  INSERTION, STENT, URETER (Left)    Final Anesthesia Type: general      Patient location during evaluation: PACU  Patient participation: Yes- Able to Participate  Level of consciousness: awake and alert  Post-procedure vital signs: reviewed and stable  Pain management: adequate  Airway patency: patent    PONV status at discharge: No PONV  Anesthetic complications: no      Cardiovascular status: blood pressure returned to baseline and stable  Respiratory status: unassisted, spontaneous ventilation and room air  Hydration status: euvolemic  Follow-up not needed.          Vitals Value Taken Time   /65 12/07/22 1157   Temp 36.7 °C (98 °F) 12/07/22 1128   Pulse 96 12/07/22 1158   Resp 16 12/07/22 1142   SpO2 93 % 12/07/22 1158   Vitals shown include unvalidated device data.      No case tracking events are documented in the log.      Pain/Maynor Score: Pain Rating Prior to Med Admin: 10 (12/7/2022  5:56 AM)  Pain Rating Post Med Admin: 5 (12/7/2022  2:09 AM)  Maynor Score: 9 (12/7/2022 11:52 AM)

## 2022-12-07 NOTE — OP NOTE
[facility]  Operative Note    Date: 12/07/2022    Pre-Op Diagnosis: Left distal ureteral stone, approx 5 mm    Post-Op Diagnosis: Same    Procedure(s) Performed:   1. Left ureteroscopy with laser lithotripsy  2. Cystoscopy  3. Pyeloscopy  4. Left ureteral stent insertion  5. Fluoro < 1 hr    Specimen(s): Stone for analysis    Staff Surgeon: Mann Geronimo MD    Assistant Surgeon: Jd Rios MD    Anesthesia: General endotracheal anesthesia    Indications: Rosmery Woodard is a 62 y.o. female with a left distal ureteral stone presenting for definitive stone management. She is not pre-stented.    Findings:  1. Stone radiolucent on  film.  2. Stone fragmented within ureter, slightly impacted to wall  3. Negative left pyeloscopy    Estimated Blood Loss: min    Drains:   1. Left 6 Fr x 22 cm JJ ureteral stent with strings    Procedure in detail:  After risks, benefits, and possible complications were explained, the patient elected to undergo the procedure and informed consent was obtained. All questions were answered in the lisa-operative area. The patient was transferred to the cystoscopy suite and placed in the supine position. SCDs were applied and working. Anesthesia was administered. The patient was then placed in the dorsal lithotomy position and prepped and draped in the usual sterile fashion. Time out was performed, and lisa-procedural antibiotics were confirmed.     The stone was not seen on  film. A rigid cystoscope in a 22 Fr sheath was introduced into the patient's urethra. This passed easily. The entire urethra was visualized which showed no strictures or masses. Cystoscopy revealed the ureteral orifices in the normal anatomic location bilaterally.    A motion wire was passed up the left ureteral orifice but was unable to transverse alongside the expected level of the stone after multiple attempts.     An 8 Fr rigid ureteroscope was passed into the patient's bladder and  into the left UO. The stone was encountered and the motion wire was passed via the ureteroscope past the stone under direct vision. The wire was inserted to the level of the kidney and confirmed with fluoroscopy. The stone was encountered at the level of the distal ureter.  A 272 micron laser fiber was passed through the ureteroscope. The stone was fragmented using the laser. Stone fragments were removed and placed in the bladder. The ureteroscope was reinserted and the entire length of the ureter was surveyed and no additional stone fragments were visualized.     Due to possible backflow of the stone fragments, the decision was made to perform pyeloscopy. An 8 Fr flexible ureteroscope was advanced alongside the motion wire to the level of the renal pelvis under dict vision. This passed easily. The motion wire was noted to remain in place.    Formal pyeloscopy was performed and no additional stone fragments were visualized. Intermittent fluoroscopy confirmed that all calyces were explored. The scope was then removed, keeping the motion wire in place. The entire ureter was surveyed upon removal and again, no additional stone fragments were seen.    The cystoscope was reinserted and the bladder was irrigated to remove the stone fragments. The bladder was drained and the cystoscope removed keeping the wire in place.     A 6 Fr x 22 cm JJ ureteral stent with strings was passed over the wire and up into the renal pelvis using fluoro. When the coil appeared to be in good position in the kidney and the radio-opaque marker of the pusher was at the inferior pubis, the wire was removed under continuous fluoro. Good coils were seen in the kidney and the bladder using fluoro.    The cystoscope was reassembled and inserted per urethra into the bladder. A 180 degree coil was observed in the renal pelvis as well as the bladder using fluoroscopy. A 180 degree coil was also seen using direct visualization in the bladder. The bladder  was drained and the cystoscope removed.    The patient tolerated the procedure well and was transferred to the recovery room in stable condition.    Disposition:  The patient will be discharged home from PACU. She follow up with an DAVE in 1 month with a renal ultrasound prior. She was given written instructions to self-remove her ureteral stent with strings on Monday, 12/12/2022.    Jd Rios MD     Attestation:  I have reviewed the notes, assessments, and/or procedures documented by Jd Rios MD, and I concur with her/his documentation of Rosmery Woodard.     I was present and scrubbed for the entire procedure.    Abelardo Geronimo MD

## 2022-12-07 NOTE — PATIENT INSTRUCTIONS
Post Cystoscopy Instructions  Do not strain to have a bowel movement  No strenuous exercise x 7 days  No driving while you are on narcotic pain medications or if your wright  catheter is in place    You can expect:  To pass stone fragments if you had a stone procedure  Have pain when you void from your stent if you have a stent in place  See blood in your urine if you have a stent in place    If you have a catheter, please return to the ER if your catheter stops draining or you are having abdominal pain.    Call the doctor if:  Temperature is greater than 101F  Persistent vomiting and inability to keep food down  Inability to void if you do not have a catheter

## 2022-12-07 NOTE — ANESTHESIA PREPROCEDURE EVALUATION
12/07/2022  Rosmery Woodard is a 62 y.o., female.      Pre-op Assessment    I have reviewed the Patient Summary Reports.     I have reviewed the Nursing Notes. I have reviewed the NPO Status.   I have reviewed the Medications.     Review of Systems  Anesthesia Hx:  Denies Family Hx of Anesthesia complications.   Denies Personal Hx of Anesthesia complications.   Social:  Non-Smoker    Hematology/Oncology:  Hematology Normal   Oncology Normal     EENT/Dental:EENT/Dental Normal   Cardiovascular:   Hypertension    Pulmonary:   Asthma (rare inhaler)    Renal/:   renal calculi (hydronephrosis)    Hepatic/GI:   PUD, (hx gastritis) Denies Liver Disease. (resolved) UC   Musculoskeletal:  Musculoskeletal Normal    Neurological:  Neurology Normal    Endocrine:  Endocrine Normal    Dermatological:  Skin Normal    Psych:   anxiety          Physical Exam  General: Well nourished, Cooperative, Alert and Oriented    Airway:  Mallampati: II   Mouth Opening: Small, but > 3cm  TM Distance: Normal  Neck ROM: Normal ROM    Dental:  Intact        Anesthesia Plan  Type of Anesthesia, risks & benefits discussed:    Anesthesia Type: Gen Supraglottic Airway  Intra-op Monitoring Plan: Standard ASA Monitors  Post Op Pain Control Plan: multimodal analgesia and IV/PO Opioids PRN  Induction:  IV  Informed Consent: Informed consent signed with the Patient and all parties understand the risks and agree with anesthesia plan.  All questions answered.   ASA Score: 2  Anesthesia Plan Notes: NO VERSED    Allergic to toradol, ibuprofen OK but avoids due to UC    Ready For Surgery From Anesthesia Perspective.     .

## 2022-12-07 NOTE — PROGRESS NOTES
Renal ultrasound to be completed in approximately 1 month prior to follow up visit with Saida Artis.

## 2022-12-07 NOTE — ANESTHESIA PROCEDURE NOTES
Intubation    Date/Time: 12/7/2022 10:38 AM  Performed by: Puma Subramanian CRNA  Authorized by: Elvie Yuen MD     Intubation:     Induction:  Intravenous    Intubated:  Postinduction    Mask Ventilation:  Not attempted    Attempts:  1    Attempted By:  CRNA    Difficult Airway Encountered?: No      Complications:  None    Airway Device:  Supraglottic airway/LMA    Airway Device Size:  4.0    Style/Cuff Inflation:  Uncuffed    Placement Verified By:  Capnometry    Complicating Factors:  None    Findings Post-Intubation:  BS equal bilateral and atraumatic/condition of teeth unchanged

## 2022-12-08 NOTE — DISCHARGE SUMMARY
[facility] [address] Medicine  Discharge Summary      Patient Name: Rosmery Woodard  MRN: [id number]  YISSEL: [id number]  Patient Class: OP- Observation  Admission Date: 12/6/2022  Hospital Length of Stay: 0 days  Discharge Date and Time: 12/7/2022  5:11 PM  Attending Physician: No att. providers found   Discharging Provider: Nia Chow MD  Primary Care Provider: Daniela Pearce MD    Primary Care Team: Networked reference to record PCT     HPI:   Ms. Woodard is a 62 year old woman who presented with left flank and abdominal pain for one week that had worsened in the last 3 days.  No fever/chills, body aches.  She has dysuria but no hematuria or frequency.  She vomited after arrival in the ED but was not nauseated prior to that.  She has had alternating constipation and diarrhea.  CT renal stone study showed a 5 mm stone possibly in the distal left ureter and associated moderate left sided hydroureteronephrosis.  UA was negative for hematuria.  Labs notable for mildly low potassium level at 3.2.  Vital signs were normal.  She is being admitted for consultation with urology.    Review of chart in Care Everywhere shows she had a stent placed on 11/16 with Dr. Lerner for a 4 mm obstructing left UPJ stone with mild left hydronephrosis.  She was instructed to return the following day for stent removal, but after she returned home she developed severe pain and pulled out the stent herself, then presented to the ED in the evening due to continued pain.  Repeat CT showed resolution of the left UPJ stone but a new 5 mm left lower pole non-obstructing stone was seen that was new compared to the CT from 11/11.  This was felt to be displacement of the previously seen obstructing UPJ stone.  Patient's pain had stabilized, and as the stone was non-obstructing she was discharged home from the ED to follow up with Dr. Lerner in the AM but she did not follow up.    Medical history includes HTN,  depression/anxiety, migraine headaches, ulcerative colitis, chronically low potassium level.  She has a remote history of smoking with quit date in 1976.  Her PCP is Dr. Daniela Pearce.      Procedure(s) (LRB):  CYSTOURETEROSCOPY,WITH HOLMIUM LASER LITHOTRIPSY OF URETERAL CALCULUS (Left)  INSERTION, STENT, URETER (Left)      Hospital Course:   Patient admitted and treated for nausea and pain while awaiting urology consultation.  Scheduled for ureteroscopy and definitive stone treatment 12/7.  She underwent left ureteroscopy with laser lithotripsy, cysto/pyeloscopy and left ureteral stent insertion on 12/7.  Stone fragments were sent for analysis.  Patient tolerated the procedure well with good pain relief and was sent home when she was tolerating a diet.  She was instructed to remove the stent in 5 days and will follow up with urology in one month.       Goals of Care Treatment Preferences:  Code Status: Full Code      Consults:   Consults (From admission, onward)        Status Ordering Provider     Inpatient consult to Urology  Once        Provider:  Mann Geronimo MD    Completed JACQUI BARRETT            Final Active Diagnoses:    Diagnosis Date Noted POA    PRINCIPAL PROBLEM:  Hydronephrosis with urinary obstruction due to ureteral calculus [N13.2] 09/14/2015 Yes    Anxiety [F41.9] 02/25/2016 Yes     Chronic    Essential hypertension [I10]  Yes     Chronic    Intractable pain [R52]  Yes    Hypokalemia [E87.6] 08/05/2014 Yes     Chronic      Problems Resolved During this Admission:       Discharged Condition: stable    Disposition: Home or Self Care    Follow Up:   Follow-up Information     Saida Artis NP Follow up.    Specialty: Urology  Why: Follow up in 4 weeks  Contact information:  [address] [facility] [address]  [phone]             Daniela Pearce MD. Go on 12/19/2022.    Why: Hospital follow up scheduled for 2:45pm.  Contact information:  [address]  [address]  [phone]                     Patient Instructions:      Diet Adult Regular     Activity as tolerated         Medications:  Reconciled Home Medications:      Medication List      START taking these medications    oxybutynin 5 MG Tab  Commonly known as: DITROPAN  Take 1 tablet (5 mg total) by mouth 3 (three) times daily as needed (Bladder spasms).     oxyCODONE-acetaminophen  mg per tablet  Commonly known as: PERCOCET  Take 1 tablet by mouth every 6 (six) hours as needed for Pain.     tamsulosin 0.4 mg Cap  Commonly known as: FLOMAX  Take 1 capsule (0.4 mg total) by mouth every evening. for 7 days        CONTINUE taking these medications    ALPRAZolam 1 MG tablet  Commonly known as: XANAX  Take 1 mg by mouth 3 (three) times daily as needed for Anxiety.     amLODIPine 10 MG tablet  Commonly known as: NORVASC  Take 10 mg by mouth once daily.     atorvastatin 10 MG tablet  Commonly known as: LIPITOR  Take 10 mg by mouth once daily.     citalopram 20 MG tablet  Commonly known as: CeleXA  Take 20 mg by mouth once daily.     diphenhydrAMINE 25 mg capsule  Commonly known as: BENADRYL  Take 25 mg by mouth every 6 (six) hours as needed for Itching.     diphenoxylate-atropine 2.5-0.025 mg 2.5-0.025 mg per tablet  Commonly known as: LOMOTIL  Take 1 tablet by mouth 4 (four) times daily as needed for Diarrhea.     gabapentin 100 MG capsule  Commonly known as: NEURONTIN  200 mg every evening.     multivitamin per tablet  Commonly known as: THERAGRAN  Take 1 tablet by mouth once daily.     omeprazole 20 MG capsule  Commonly known as: PRILOSEC  Take 20 mg by mouth 2 (two) times daily.     promethazine 25 MG tablet  Commonly known as: PHENERGAN  Take 25 mg by mouth every 8 (eight) hours as needed for Nausea.     zolpidem 10 mg Tab  Commonly known as: AMBIEN  Take 10 mg by mouth every evening.            Time spent on the discharge of patient: <30 minutes         Nia Chow MD  Department of Hospital  [facility] - Med Surg ([address])

## 2022-12-13 LAB
COMPN STONE: NORMAL
SPECIMEN SOURCE: NORMAL
STONE ANALYSIS IR-IMP: NORMAL

## 2022-12-14 ENCOUNTER — HOSPITAL ENCOUNTER (EMERGENCY)
Facility: OTHER | Age: 62
Discharge: HOME | End: 2022-12-14
Attending: EMERGENCY MEDICINE
Payer: MEDICARE

## 2022-12-14 VITALS
HEART RATE: 59 BPM | RESPIRATION RATE: 18 BRPM | OXYGEN SATURATION: 99 % | BODY MASS INDEX: 27.99 KG/M2 | WEIGHT: 158 LBS | DIASTOLIC BLOOD PRESSURE: 77 MMHG | SYSTOLIC BLOOD PRESSURE: 132 MMHG | TEMPERATURE: 98 F

## 2022-12-14 DIAGNOSIS — R19.7 NAUSEA VOMITING AND DIARRHEA: Primary | ICD-10-CM

## 2022-12-14 DIAGNOSIS — R11.2 NAUSEA VOMITING AND DIARRHEA: Primary | ICD-10-CM

## 2022-12-14 DIAGNOSIS — E87.6 HYPOKALEMIA: ICD-10-CM

## 2022-12-14 LAB
ALBUMIN SERPL BCP-MCNC: 3.2 G/DL (ref 3.5–5.2)
ALP SERPL-CCNC: 64 U/L (ref 55–135)
ALT SERPL W/O P-5'-P-CCNC: 19 U/L (ref 10–44)
ANION GAP SERPL CALC-SCNC: 10 MMOL/L (ref 8–16)
AST SERPL-CCNC: 19 U/L (ref 10–40)
BACTERIA #/AREA URNS HPF: ABNORMAL /HPF
BASOPHILS # BLD AUTO: 0.03 K/UL (ref 0–0.2)
BASOPHILS NFR BLD: 0.3 % (ref 0–1.9)
BILIRUB SERPL-MCNC: 0.3 MG/DL (ref 0.1–1)
BILIRUB UR QL STRIP: NEGATIVE
BUN SERPL-MCNC: 12 MG/DL (ref 8–23)
CALCIUM SERPL-MCNC: 8.6 MG/DL (ref 8.7–10.5)
CHLORIDE SERPL-SCNC: 104 MMOL/L (ref 95–110)
CLARITY UR: CLEAR
CO2 SERPL-SCNC: 26 MMOL/L (ref 23–29)
COLOR UR: YELLOW
CREAT SERPL-MCNC: 0.9 MG/DL (ref 0.5–1.4)
CREAT SERPL-MCNC: 1 MG/DL (ref 0.5–1.4)
DIFFERENTIAL METHOD: ABNORMAL
EOSINOPHIL # BLD AUTO: 0.4 K/UL (ref 0–0.5)
EOSINOPHIL NFR BLD: 3.8 % (ref 0–8)
ERYTHROCYTE [DISTWIDTH] IN BLOOD BY AUTOMATED COUNT: 13.5 % (ref 11.5–14.5)
EST. GFR  (NO RACE VARIABLE): >60 ML/MIN/1.73 M^2
GLUCOSE SERPL-MCNC: 97 MG/DL (ref 70–110)
GLUCOSE UR QL STRIP: NEGATIVE
HCT VFR BLD AUTO: 34.4 % (ref 37–48.5)
HGB BLD-MCNC: 11.7 G/DL (ref 12–16)
HGB UR QL STRIP: NEGATIVE
IMM GRANULOCYTES # BLD AUTO: 0.02 K/UL (ref 0–0.04)
IMM GRANULOCYTES NFR BLD AUTO: 0.2 % (ref 0–0.5)
KETONES UR QL STRIP: NEGATIVE
LEUKOCYTE ESTERASE UR QL STRIP: ABNORMAL
LIPASE SERPL-CCNC: 12 U/L (ref 4–60)
LYMPHOCYTES # BLD AUTO: 3 K/UL (ref 1–4.8)
LYMPHOCYTES NFR BLD: 31.2 % (ref 18–48)
MCH RBC QN AUTO: 29.1 PG (ref 27–31)
MCHC RBC AUTO-ENTMCNC: 34 G/DL (ref 32–36)
MCV RBC AUTO: 86 FL (ref 82–98)
MICROSCOPIC COMMENT: ABNORMAL
MONOCYTES # BLD AUTO: 0.6 K/UL (ref 0.3–1)
MONOCYTES NFR BLD: 6.5 % (ref 4–15)
NEUTROPHILS # BLD AUTO: 5.6 K/UL (ref 1.8–7.7)
NEUTROPHILS NFR BLD: 58 % (ref 38–73)
NITRITE UR QL STRIP: NEGATIVE
NRBC BLD-RTO: 0 /100 WBC
PH UR STRIP: 6 [PH] (ref 5–8)
PLATELET # BLD AUTO: 413 K/UL (ref 150–450)
PMV BLD AUTO: 10.4 FL (ref 9.2–12.9)
POTASSIUM SERPL-SCNC: 3 MMOL/L (ref 3.5–5.1)
PROT SERPL-MCNC: 6.9 G/DL (ref 6–8.4)
PROT UR QL STRIP: NEGATIVE
RBC # BLD AUTO: 4.02 M/UL (ref 4–5.4)
RBC #/AREA URNS HPF: 3 /HPF (ref 0–4)
SAMPLE: NORMAL
SODIUM SERPL-SCNC: 140 MMOL/L (ref 136–145)
SP GR UR STRIP: 1.01 (ref 1–1.03)
SQUAMOUS #/AREA URNS HPF: 1 /HPF
UNIDENT CRYS URNS QL MICRO: 6
URN SPEC COLLECT METH UR: ABNORMAL
UROBILINOGEN UR STRIP-ACNC: NEGATIVE EU/DL
WBC # BLD AUTO: 9.68 K/UL (ref 3.9–12.7)
WBC #/AREA URNS HPF: 4 /HPF (ref 0–5)

## 2022-12-14 PROCEDURE — 25000003 PHARM REV CODE 250: Performed by: EMERGENCY MEDICINE

## 2022-12-14 PROCEDURE — 63600175 PHARM REV CODE 636 W HCPCS: Performed by: EMERGENCY MEDICINE

## 2022-12-14 PROCEDURE — 83690 ASSAY OF LIPASE: CPT | Performed by: EMERGENCY MEDICINE

## 2022-12-14 PROCEDURE — 85025 COMPLETE CBC W/AUTO DIFF WBC: CPT | Performed by: EMERGENCY MEDICINE

## 2022-12-14 PROCEDURE — 96365 THER/PROPH/DIAG IV INF INIT: CPT

## 2022-12-14 PROCEDURE — 96375 TX/PRO/DX INJ NEW DRUG ADDON: CPT

## 2022-12-14 PROCEDURE — 96376 TX/PRO/DX INJ SAME DRUG ADON: CPT

## 2022-12-14 PROCEDURE — 80053 COMPREHEN METABOLIC PANEL: CPT | Performed by: EMERGENCY MEDICINE

## 2022-12-14 PROCEDURE — 81000 URINALYSIS NONAUTO W/SCOPE: CPT | Performed by: EMERGENCY MEDICINE

## 2022-12-14 PROCEDURE — 96366 THER/PROPH/DIAG IV INF ADDON: CPT

## 2022-12-14 PROCEDURE — 99284 EMERGENCY DEPT VISIT MOD MDM: CPT | Mod: 25

## 2022-12-14 RX ORDER — PROMETHAZINE HYDROCHLORIDE 25 MG/1
25 TABLET ORAL EVERY 8 HOURS PRN
Qty: 30 TABLET | Refills: 1 | Status: SHIPPED | OUTPATIENT
Start: 2022-12-14

## 2022-12-14 RX ORDER — POTASSIUM CHLORIDE 750 MG/1
10 TABLET, EXTENDED RELEASE ORAL 2 TIMES DAILY
Qty: 6 TABLET | Refills: 0 | Status: SHIPPED | OUTPATIENT
Start: 2022-12-14 | End: 2022-12-17

## 2022-12-14 RX ORDER — HYDROMORPHONE HYDROCHLORIDE 1 MG/ML
0.5 INJECTION, SOLUTION INTRAMUSCULAR; INTRAVENOUS; SUBCUTANEOUS
Status: COMPLETED | OUTPATIENT
Start: 2022-12-14 | End: 2022-12-14

## 2022-12-14 RX ORDER — HYDROMORPHONE HYDROCHLORIDE 1 MG/ML
0.25 INJECTION, SOLUTION INTRAMUSCULAR; INTRAVENOUS; SUBCUTANEOUS
Status: COMPLETED | OUTPATIENT
Start: 2022-12-14 | End: 2022-12-14

## 2022-12-14 RX ORDER — OXYCODONE AND ACETAMINOPHEN 5; 325 MG/1; MG/1
1 TABLET ORAL EVERY 6 HOURS PRN
Qty: 12 TABLET | Refills: 0 | Status: SHIPPED | OUTPATIENT
Start: 2022-12-14

## 2022-12-14 RX ORDER — POTASSIUM CHLORIDE 20 MEQ/1
20 TABLET, EXTENDED RELEASE ORAL
Status: COMPLETED | OUTPATIENT
Start: 2022-12-14 | End: 2022-12-14

## 2022-12-14 RX ADMIN — PROMETHAZINE HYDROCHLORIDE 25 MG: 25 INJECTION INTRAMUSCULAR; INTRAVENOUS at 11:12

## 2022-12-14 RX ADMIN — HYDROMORPHONE HYDROCHLORIDE 0.5 MG: 1 INJECTION, SOLUTION INTRAMUSCULAR; INTRAVENOUS; SUBCUTANEOUS at 11:12

## 2022-12-14 RX ADMIN — SODIUM CHLORIDE, SODIUM LACTATE, POTASSIUM CHLORIDE, AND CALCIUM CHLORIDE 500 ML: .6; .31; .03; .02 INJECTION, SOLUTION INTRAVENOUS at 02:12

## 2022-12-14 RX ADMIN — PROMETHAZINE HYDROCHLORIDE 25 MG: 25 INJECTION INTRAMUSCULAR; INTRAVENOUS at 02:12

## 2022-12-14 RX ADMIN — HYDROMORPHONE HYDROCHLORIDE 0.25 MG: 1 INJECTION, SOLUTION INTRAMUSCULAR; INTRAVENOUS; SUBCUTANEOUS at 02:12

## 2022-12-14 RX ADMIN — POTASSIUM CHLORIDE 20 MEQ: 1500 TABLET, EXTENDED RELEASE ORAL at 03:12

## 2022-12-14 NOTE — ED PROVIDER NOTES
"Encounter Date: 12/14/2022    SCRIBE #1 NOTE: I, Arina Rosales, am scribing for, and in the presence of,  Terri Doll MD.   SCRIBE #2 NOTE: I, Vinita Gonzalez, am scribing for, and in the presence of,  Terri Doll MD.   History     Chief Complaint   Patient presents with    Abdominal Pain     Patient to ED with c/o generalized abdominal discomfort , n/v/d x 4 days      62 yr-old female, with a PMHx of colitis, presents with complaints of abdominal pain. She reports that the cramping pain began 3 days ago accompanied by diarrhea. She reports that there is occasionally blood present during bowel movements but she is unsure whether this is a result of hematuria or hematochezia. She also endorses vomiting, nausea, and chills. She reports 6 episodes of diarrhea and 3 episodes of vomiting today. Her pain is improved with promethazine that was prescribed to her following placement of a urethral stent for kidney stones 1 week ago.  This is the extent of the patient's complaints at this time.    The history is provided by the patient.   Review of patient's allergies indicates:   Allergen Reactions    Versed [midazolam] Nausea And Vomiting    Amlodipine Other (See Comments)     Patient says that she does not do well with generic amlodipine but does do well with Norvasc (amlodipine).    Bentyl [dicyclomine] Hives    Darvocet a500 [propoxyphene n-acetaminophen] Hives    Toradol [ketorolac] Hives    Zofran [ondansetron hcl (pf)] Nausea And Vomiting    Morphine Nausea And Vomiting     12/7/22 @ 0935 Pt, stated "I take Dilaudid without any reactions to it" pt also states that she "can take oxycodone for pain."      Past Medical History:   Diagnosis Date    Asthma     Hypertension     Liver abscess     hx    Portal vein thrombosis 10/2013    Portal vein thrombosis 10/2013    Ulcerative colitis 9/15/2013     Past Surgical History:   Procedure Laterality Date    ABDOMINAL SURGERY      3 exploratory surgeries    APPENDECTOMY   "     SECTION, LOW TRANSVERSE      3 C-sections    CHOLECYSTECTOMY      COLONOSCOPY      COLONOSCOPY N/A 2016    Procedure: COLONOSCOPY;  Surgeon: Brad King MD;  Location: [facility] ([address] [place] [address]);  Service: Endoscopy;  Laterality: N/A;    EXPLORATORY LAPAROTOMY W/ BOWEL RESECTION      HERNIA REPAIR      HYSTERECTOMY      INCISIONAL HERNIA REPAIR      UPPER GASTROINTESTINAL ENDOSCOPY      URETERAL STENT PLACEMENT Left 2022    Procedure: INSERTION, STENT, URETER;  Surgeon: Mann Geronimo MD;  Location: [facility];  Service: Urology;  Laterality: Left;     Family History   Problem Relation Age of Onset    Hypertension Maternal Grandmother     Cirrhosis Maternal Aunt     Breast cancer Maternal Aunt     Colon cancer Maternal Uncle     Breast cancer Cousin     Celiac disease Neg Hx     Colon polyps Neg Hx     Crohn's disease Neg Hx     Cystic fibrosis Neg Hx     Esophageal cancer Neg Hx     Hemochromatosis Neg Hx     Inflammatory bowel disease Neg Hx     Irritable bowel syndrome Neg Hx     Liver cancer Neg Hx     Liver disease Neg Hx     Rectal cancer Neg Hx     Stomach cancer Neg Hx     Ulcerative colitis Neg Hx     Scott's disease Neg Hx      Social History     Tobacco Use    Smoking status: Former     Packs/day: 0.10     Years: 0.50     Pack years: 0.05     Types: Cigarettes     Quit date: 1976     Years since quittin.3    Smokeless tobacco: Never   Substance Use Topics    Alcohol use: Yes     Alcohol/week: 1.0 standard drink     Types: 1 Glasses of wine per week     Comment: rare    Drug use: No     Review of Systems   Constitutional:  Positive for chills. Negative for fever.   HENT:  Negative for congestion and sore throat.    Eyes:  Negative for visual disturbance.   Respiratory:  Negative for cough and shortness of breath.    Cardiovascular:  Negative for chest pain and palpitations.   Gastrointestinal:  Positive for abdominal pain, blood in stool (possible), diarrhea,  nausea and vomiting.   Genitourinary:  Positive for frequency, hematuria (possible) and urgency. Negative for decreased urine volume, dysuria and vaginal discharge.   Musculoskeletal:  Negative for joint swelling, neck pain and neck stiffness.   Skin:  Negative for rash and wound.   Neurological:  Negative for weakness, numbness and headaches.   Psychiatric/Behavioral:  Negative for confusion.      Physical Exam     Initial Vitals [12/14/22 0849]   BP Pulse Resp Temp SpO2   119/68 88 16 97.7 °F (36.5 °C) 100 %      MAP       --         Physical Exam    Nursing note and vitals reviewed.  Constitutional: She appears well-developed and well-nourished. She appears distressed.   Uncomfortable appearance.    HENT:   Head: Normocephalic and atraumatic.   Mouth/Throat: Oropharynx is clear and moist. No oropharyngeal exudate.   Eyes: Conjunctivae and EOM are normal. Pupils are equal, round, and reactive to light.   Neck: Neck supple.   Normal range of motion.  Cardiovascular:  Normal rate and normal heart sounds.           No murmur heard.  Pulmonary/Chest: Breath sounds normal. No respiratory distress. She has no wheezes. She has no rhonchi. She has no rales.   Abdominal: Abdomen is soft. There is no abdominal tenderness.   Hyperactive bowel sounds. Periumbilical and epigastric tenderness.  There is no rebound and no guarding.   Musculoskeletal:         General: No tenderness or edema.      Cervical back: Normal range of motion and neck supple.     Neurological: She is alert and oriented to person, place, and time. She has normal strength. No cranial nerve deficit or sensory deficit. GCS score is 15. GCS eye subscore is 4. GCS verbal subscore is 5. GCS motor subscore is 6.   Skin: Skin is warm and dry. No rash noted.   Psychiatric: She has a normal mood and affect. Thought content normal.       ED Course   Procedures  Labs Reviewed   URINALYSIS, REFLEX TO URINE CULTURE - Abnormal; Notable for the following components:        Result Value    Leukocytes, UA 1+ (*)     All other components within normal limits    Narrative:     Specimen Source->Urine   CBC W/ AUTO DIFFERENTIAL - Abnormal; Notable for the following components:    Hemoglobin 11.7 (*)     Hematocrit 34.4 (*)     All other components within normal limits   COMPREHENSIVE METABOLIC PANEL - Abnormal; Notable for the following components:    Potassium 3.0 (*)     Calcium 8.6 (*)     Albumin 3.2 (*)     All other components within normal limits   URINALYSIS MICROSCOPIC - Abnormal; Notable for the following components:    Bacteria Few (*)     All other components within normal limits    Narrative:     Specimen Source->Urine   LIPASE   ISTAT CREATININE          Imaging Results    None          Medications   HYDROmorphone injection 0.5 mg (0.5 mg Intravenous Given 12/14/22 1116)   promethazine (PHENERGAN) 25 mg in dextrose 5 % 50 mL IVPB (0 mg Intravenous Stopped 12/14/22 1138)   lactated ringers bolus 500 mL (0 mLs Intravenous Stopped 12/14/22 1507)   potassium chloride SA CR tablet 20 mEq (20 mEq Oral Given 12/14/22 1518)   promethazine (PHENERGAN) 25 mg in dextrose 5 % 50 mL IVPB (0 mg Intravenous Stopped 12/14/22 1512)   HYDROmorphone injection 0.25 mg (0.25 mg Intravenous Given 12/14/22 1452)     Medical Decision Making:   History:   Old Medical Records: I decided to obtain old medical records.  Clinical Tests:   Lab Tests: Ordered and Reviewed  ED Management:  Emergent evaluation of 62-year-old female who presents complaint of nausea vomiting and diarrhea with associated abdominal pain, history of colitis.  She reports possible blood in the toilet intermittently.  Vital signs are benign, afebrile.  On exam there is mild epigastric tenderness, but a nonsurgical abdomen.  I am suspicious for gastroenteritis.  Urinalysis shows no hematuria, no infection.  Labs show stable H&H, no major electrolyte disturbance beyond hypokalemia which is likely from her fluid loss in the GI tract.   Potassium was replaced orally successfully, and she was hydrated and treated with analgesics with much improvement in her symptoms.  There is no evidence of pancreatitis or hepatitis.  Ultimately she is discharged in good condition with prescriptions for a 3 day course of potassium repletion, Phenergan tablets, and a few tablets of oxycodone.  She is advised close follow-up with her doctor and given strict return precautions.        Scribe Attestation:   Scribe #1: I performed the above scribed service and the documentation accurately describes the services I performed. I attest to the accuracy of the note.      ED Course as of 22 1031   Wed Dec 14, 2022   1402 On reassessment, patient complains of mild nausea.  She states that overall she feels much improved.  I discussed lab results.  She states she would like to try to eat, will attempt Jello or mild food after repeat antiemetics. [AK]      ED Course User Index  [AK] Terri Doll MD            Physician Attestation for Scribe: I, Terri Doll, reviewed documentation as scribed in my presence, which is both accurate and complete.           Clinical Impression:   Final diagnoses:  [R11.2, R19.7] Nausea vomiting and diarrhea (Primary)  [E87.6] Hypokalemia        ED Disposition Condition    Discharge Stable          ED Prescriptions       Medication Sig Dispense Start Date End Date Auth. Provider    promethazine (PHENERGAN) 25 MG tablet Take 1 tablet (25 mg total) by mouth every 8 (eight) hours as needed for Nausea. 30 tablet 2022 -- Terri Doll MD    oxyCODONE-acetaminophen (PERCOCET) 5-325 mg per tablet Take 1 tablet by mouth every 6 (six) hours as needed for Pain. 12 tablet 2022 -- Terri Doll MD    potassium chloride (KLOR-CON) 10 MEQ TbSR () Take 1 tablet (10 mEq total) by mouth 2 (two) times daily. for 3 days 6 tablet 2022 Terri Doll MD          Follow-up Information       Follow up With Specialties  Details Why Contact Info    Daniela Pearce MD Family Medicine Schedule an appointment as soon as possible for a visit   [address] [facility] [address]  [facility] [address] [phone]      [facility] - Emergency Dept Emergency Medicine  As needed, If symptoms worsen [address] [facility] [id number] [phone]             Terri Doll MD  12/22/22 1036

## 2022-12-20 ENCOUNTER — PATIENT OUTREACH (OUTPATIENT)
Dept: EMERGENCY MEDICINE | Facility: OTHER | Age: 62
End: 2022-12-20
Payer: MEDICARE

## 2022-12-20 NOTE — PROGRESS NOTES
ED Navigator unable to reach patient for navigation services. Home # was the incorrect number and the mobile # is no longer in service. Closing encounter.

## 2023-01-19 ENCOUNTER — HOSPITAL ENCOUNTER (EMERGENCY)
Facility: OTHER | Age: 63
Discharge: HOME | End: 2023-01-19
Attending: EMERGENCY MEDICINE
Payer: MEDICARE

## 2023-01-19 VITALS
BODY MASS INDEX: 28 KG/M2 | SYSTOLIC BLOOD PRESSURE: 125 MMHG | HEIGHT: 63 IN | RESPIRATION RATE: 17 BRPM | OXYGEN SATURATION: 99 % | WEIGHT: 158 LBS | TEMPERATURE: 98 F | DIASTOLIC BLOOD PRESSURE: 63 MMHG | HEART RATE: 58 BPM

## 2023-01-19 DIAGNOSIS — R10.9 LEFT SIDED ABDOMINAL PAIN OF UNKNOWN CAUSE: ICD-10-CM

## 2023-01-19 DIAGNOSIS — R11.0 NAUSEA: Primary | ICD-10-CM

## 2023-01-19 DIAGNOSIS — M54.50 ACUTE LEFT-SIDED LOW BACK PAIN WITHOUT SCIATICA: ICD-10-CM

## 2023-01-19 LAB
ANION GAP SERPL CALC-SCNC: 8 MMOL/L (ref 8–16)
BASOPHILS # BLD AUTO: 0.04 K/UL (ref 0–0.2)
BASOPHILS NFR BLD: 0.5 % (ref 0–1.9)
BILIRUB UR QL STRIP: NEGATIVE
BUN SERPL-MCNC: 14 MG/DL (ref 8–23)
CALCIUM SERPL-MCNC: 9.4 MG/DL (ref 8.7–10.5)
CHLORIDE SERPL-SCNC: 107 MMOL/L (ref 95–110)
CLARITY UR: CLEAR
CO2 SERPL-SCNC: 26 MMOL/L (ref 23–29)
COLOR UR: YELLOW
CREAT SERPL-MCNC: 0.9 MG/DL (ref 0.5–1.4)
DIFFERENTIAL METHOD: NORMAL
EOSINOPHIL # BLD AUTO: 0.2 K/UL (ref 0–0.5)
EOSINOPHIL NFR BLD: 3.1 % (ref 0–8)
ERYTHROCYTE [DISTWIDTH] IN BLOOD BY AUTOMATED COUNT: 14.1 % (ref 11.5–14.5)
EST. GFR  (NO RACE VARIABLE): >60 ML/MIN/1.73 M^2
GLUCOSE SERPL-MCNC: 106 MG/DL (ref 70–110)
GLUCOSE UR QL STRIP: NEGATIVE
HCT VFR BLD AUTO: 37.8 % (ref 37–48.5)
HGB BLD-MCNC: 12.7 G/DL (ref 12–16)
HGB UR QL STRIP: NEGATIVE
IMM GRANULOCYTES # BLD AUTO: 0.01 K/UL (ref 0–0.04)
IMM GRANULOCYTES NFR BLD AUTO: 0.1 % (ref 0–0.5)
KETONES UR QL STRIP: NEGATIVE
LEUKOCYTE ESTERASE UR QL STRIP: ABNORMAL
LYMPHOCYTES # BLD AUTO: 2.8 K/UL (ref 1–4.8)
LYMPHOCYTES NFR BLD: 37.8 % (ref 18–48)
MCH RBC QN AUTO: 28.9 PG (ref 27–31)
MCHC RBC AUTO-ENTMCNC: 33.6 G/DL (ref 32–36)
MCV RBC AUTO: 86 FL (ref 82–98)
MICROSCOPIC COMMENT: ABNORMAL
MONOCYTES # BLD AUTO: 0.4 K/UL (ref 0.3–1)
MONOCYTES NFR BLD: 5.5 % (ref 4–15)
NEUTROPHILS # BLD AUTO: 3.9 K/UL (ref 1.8–7.7)
NEUTROPHILS NFR BLD: 53 % (ref 38–73)
NITRITE UR QL STRIP: NEGATIVE
NRBC BLD-RTO: 0 /100 WBC
PH UR STRIP: 6 [PH] (ref 5–8)
PLATELET # BLD AUTO: 433 K/UL (ref 150–450)
PMV BLD AUTO: 10.7 FL (ref 9.2–12.9)
POTASSIUM SERPL-SCNC: 3.6 MMOL/L (ref 3.5–5.1)
PROT UR QL STRIP: ABNORMAL
RBC # BLD AUTO: 4.39 M/UL (ref 4–5.4)
RBC #/AREA URNS HPF: 2 /HPF (ref 0–4)
SODIUM SERPL-SCNC: 141 MMOL/L (ref 136–145)
SP GR UR STRIP: 1.02 (ref 1–1.03)
SQUAMOUS #/AREA URNS HPF: 3 /HPF
UNIDENT CRYS URNS QL MICRO: 1
URN SPEC COLLECT METH UR: ABNORMAL
UROBILINOGEN UR STRIP-ACNC: NEGATIVE EU/DL
WBC # BLD AUTO: 7.41 K/UL (ref 3.9–12.7)
WBC #/AREA URNS HPF: 10 /HPF (ref 0–5)

## 2023-01-19 PROCEDURE — 81000 URINALYSIS NONAUTO W/SCOPE: CPT | Performed by: EMERGENCY MEDICINE

## 2023-01-19 PROCEDURE — 96361 HYDRATE IV INFUSION ADD-ON: CPT

## 2023-01-19 PROCEDURE — 80048 BASIC METABOLIC PNL TOTAL CA: CPT | Performed by: EMERGENCY MEDICINE

## 2023-01-19 PROCEDURE — 63600175 PHARM REV CODE 636 W HCPCS: Performed by: EMERGENCY MEDICINE

## 2023-01-19 PROCEDURE — 85025 COMPLETE CBC W/AUTO DIFF WBC: CPT | Performed by: EMERGENCY MEDICINE

## 2023-01-19 PROCEDURE — 96365 THER/PROPH/DIAG IV INF INIT: CPT

## 2023-01-19 PROCEDURE — 25000003 PHARM REV CODE 250: Performed by: EMERGENCY MEDICINE

## 2023-01-19 PROCEDURE — 99285 EMERGENCY DEPT VISIT HI MDM: CPT | Mod: 25

## 2023-01-19 PROCEDURE — 96375 TX/PRO/DX INJ NEW DRUG ADDON: CPT

## 2023-01-19 RX ORDER — PROMETHAZINE HYDROCHLORIDE 25 MG/1
25 TABLET ORAL EVERY 6 HOURS PRN
Qty: 15 TABLET | Refills: 0 | Status: SHIPPED | OUTPATIENT
Start: 2023-01-19

## 2023-01-19 RX ORDER — HYDROCODONE BITARTRATE AND ACETAMINOPHEN 5; 325 MG/1; MG/1
1 TABLET ORAL EVERY 8 HOURS PRN
Qty: 9 TABLET | Refills: 0 | Status: SHIPPED | OUTPATIENT
Start: 2023-01-19 | End: 2023-01-22

## 2023-01-19 RX ORDER — MORPHINE SULFATE 2 MG/ML
2 INJECTION, SOLUTION INTRAMUSCULAR; INTRAVENOUS
Status: COMPLETED | OUTPATIENT
Start: 2023-01-19 | End: 2023-01-19

## 2023-01-19 RX ORDER — SODIUM CHLORIDE 9 MG/ML
1000 INJECTION, SOLUTION INTRAVENOUS
Status: COMPLETED | OUTPATIENT
Start: 2023-01-19 | End: 2023-01-19

## 2023-01-19 RX ORDER — SODIUM CHLORIDE 9 MG/ML
1000 INJECTION, SOLUTION INTRAVENOUS
Status: DISCONTINUED | OUTPATIENT
Start: 2023-01-19 | End: 2023-01-19 | Stop reason: HOSPADM

## 2023-01-19 RX ADMIN — SODIUM CHLORIDE 1000 ML: 9 INJECTION, SOLUTION INTRAVENOUS at 04:01

## 2023-01-19 RX ADMIN — PROMETHAZINE HYDROCHLORIDE 12.5 MG: 25 INJECTION INTRAMUSCULAR; INTRAVENOUS at 04:01

## 2023-01-19 RX ADMIN — MORPHINE SULFATE 2 MG: 2 INJECTION, SOLUTION INTRAMUSCULAR; INTRAVENOUS at 04:01

## 2023-01-19 NOTE — ED NOTES
"Rosmery Woodard, an 62 y.o. female presents to the ED reporting abd cramping, left flank, nausea and dysuria x 3 days. Hx of kidney stones and concerned it may be another. Pt appears uncomfortable but in NAD. Pt axo3      Chief Complaint   Patient presents with    Abdominal Pain     X 1 week, fatigue, nausea, abd pain and back pain. Hx of kidney stones last month. Pt reports feels the same.     Review of patient's allergies indicates:   Allergen Reactions    Versed [midazolam] Nausea And Vomiting    Amlodipine Other (See Comments)     Patient says that she does not do well with generic amlodipine but does do well with Norvasc (amlodipine).    Bentyl [dicyclomine] Hives    Darvocet a500 [propoxyphene n-acetaminophen] Hives    Toradol [ketorolac] Hives    Zofran [ondansetron hcl (pf)] Nausea And Vomiting    Morphine Nausea And Vomiting     12/7/22 @ 0935 Pt, stated "I take Dilaudid without any reactions to it" pt also states that she "can take oxycodone for pain."      Past Medical History:   Diagnosis Date    Asthma     Hypertension     Liver abscess     hx    Portal vein thrombosis 10/2013    Portal vein thrombosis 10/2013    Ulcerative colitis 9/15/2013     LOC: Patient name and date of birth verified.  The patient is awake, alert and aware of environment with an appropriate affect, the patient is oriented x 3 and speaking appropriately.  Pt in NAD.    APPEARANCE: Patient resting comfortably and in no acute distress, patient is clean and well groomed, patient's clothing is properly fastened.  SKIN: The skin is warm and dry, color consistent with ethnicity, patient has normal skin turgor and moist mucus membranes, skin intact, no breakdown or brusing noted.  MUSCULOSKELETAL: Patient moving all extremities well, no obvious swelling or deformities noted.  RESPIRATORY: Airway is open and patent, respirations are spontaneous, patient has a normal effort and rate, no accessory muscle use noted.  CARDIAC: Patient has a " normal rate and rhythm, no periphreal edema noted, capillary refill < 3 seconds.  ABDOMEN: No distention noted. Bowel sounds present in all four quadrants. Left flank pain, nausea and cramping.  NEUROLOGIC: Eyes open spontaneously, behavior appropriate to situation, follows commands, facial expression symmetrical, bilateral hand grasp equal and even, purposeful motor response noted, normal sensation in all extremities when touched.

## 2023-01-19 NOTE — ED PROVIDER NOTES
"Encounter Date: 2023    SCRIBE #1 NOTE: I, Yovani Maldonado, am scribing for, and in the presence of,  Kristyn Leonard MD.     History     Chief Complaint   Patient presents with    Abdominal Pain     X 1 week, fatigue, nausea, abd pain and back pain. Hx of kidney stones last month. Pt reports feels the same.     Time seen by provider: 3:25 AM    This is a 62 y.o. female who presents with complaint of cramping abdominal pain and nausea for the past 3 days. The patient also complains of intermittent left-sided lower back pain and left flank pain. She also complains of mild dysuria but she denies any other urinary symptoms. She also denies fever, chills, and body aches. The patient reports taking cranberry pills for her symptoms without relief. She has a history of multiple kidney stones, most recently last month, which have required ureteral stents to be placed. This is the extent of the patient's complaints at this time.    The history is provided by the patient and medical records.   Review of patient's allergies indicates:   Allergen Reactions    Versed [midazolam] Nausea And Vomiting    Amlodipine Other (See Comments)     Patient says that she does not do well with generic amlodipine but does do well with Norvasc (amlodipine).    Bentyl [dicyclomine] Hives    Darvocet a500 [propoxyphene n-acetaminophen] Hives    Toradol [ketorolac] Hives    Zofran [ondansetron hcl (pf)] Nausea And Vomiting    Morphine Nausea And Vomiting     22 @ 0935 Pt, stated "I take Dilaudid without any reactions to it" pt also states that she "can take oxycodone for pain."      Past Medical History:   Diagnosis Date    Asthma     Hypertension     Liver abscess     hx    Portal vein thrombosis 10/2013    Portal vein thrombosis 10/2013    Ulcerative colitis 9/15/2013     Past Surgical History:   Procedure Laterality Date    ABDOMINAL SURGERY      3 exploratory surgeries    APPENDECTOMY       SECTION, LOW TRANSVERSE      3 " C-sections    CHOLECYSTECTOMY      COLONOSCOPY      COLONOSCOPY N/A 2016    Procedure: COLONOSCOPY;  Surgeon: Brad King MD;  Location: [facility] ([address] [place] [address]);  Service: Endoscopy;  Laterality: N/A;    EXPLORATORY LAPAROTOMY W/ BOWEL RESECTION      HERNIA REPAIR      HYSTERECTOMY      INCISIONAL HERNIA REPAIR      UPPER GASTROINTESTINAL ENDOSCOPY      URETERAL STENT PLACEMENT Left 2022    Procedure: INSERTION, STENT, URETER;  Surgeon: Mann Geronimo MD;  Location: [facility];  Service: Urology;  Laterality: Left;     Family History   Problem Relation Age of Onset    Hypertension Maternal Grandmother     Cirrhosis Maternal Aunt     Breast cancer Maternal Aunt     Colon cancer Maternal Uncle     Breast cancer Cousin     Celiac disease Neg Hx     Colon polyps Neg Hx     Crohn's disease Neg Hx     Cystic fibrosis Neg Hx     Esophageal cancer Neg Hx     Hemochromatosis Neg Hx     Inflammatory bowel disease Neg Hx     Irritable bowel syndrome Neg Hx     Liver cancer Neg Hx     Liver disease Neg Hx     Rectal cancer Neg Hx     Stomach cancer Neg Hx     Ulcerative colitis Neg Hx     Scott's disease Neg Hx      Social History     Tobacco Use    Smoking status: Former     Packs/day: 0.10     Years: 0.50     Pack years: 0.05     Types: Cigarettes     Quit date: 1976     Years since quittin.3    Smokeless tobacco: Never   Substance Use Topics    Alcohol use: Yes     Alcohol/week: 1.0 standard drink     Types: 1 Glasses of wine per week     Comment: rare    Drug use: No     Review of Systems   Constitutional:  Negative for activity change, appetite change, chills, diaphoresis and fever.   HENT:  Negative for congestion, sore throat and trouble swallowing.    Eyes:  Negative for photophobia and visual disturbance.   Respiratory:  Negative for cough, chest tightness and shortness of breath.    Cardiovascular:  Negative for chest pain.   Gastrointestinal:  Positive for abdominal pain and nausea.  Negative for vomiting.   Endocrine: Negative for polydipsia and polyuria.   Genitourinary:  Positive for dysuria and flank pain. Negative for difficulty urinating, hematuria and urgency.   Musculoskeletal:  Positive for back pain. Negative for neck pain.   Skin:  Negative for rash.   Neurological:  Negative for weakness and headaches.   Psychiatric/Behavioral:  Negative for confusion.      Physical Exam     Initial Vitals [01/19/23 0233]   BP Pulse Resp Temp SpO2   117/70 95 18 98.8 °F (37.1 °C) 98 %      MAP       --         Physical Exam    Nursing note and vitals reviewed.  Constitutional: She appears well-developed and well-nourished. She does not have a sickly appearance. No distress.   HENT:   Head: Normocephalic and atraumatic.   Right Ear: External ear normal.   Left Ear: External ear normal.   Eyes: Conjunctivae, EOM and lids are normal. Right eye exhibits no discharge. Left eye exhibits no discharge. Right conjunctiva is not injected. Right conjunctiva has no hemorrhage. Left conjunctiva is not injected. Left conjunctiva has no hemorrhage. No scleral icterus.   Neck: Phonation normal. No stridor present. No tracheal deviation present.   Normal range of motion.  Cardiovascular:  Normal rate, regular rhythm and normal heart sounds.     Exam reveals no friction rub.       No murmur heard.  Pulses:       Radial pulses are 2+ on the right side and 2+ on the left side.        Dorsalis pedis pulses are 2+ on the right side and 2+ on the left side.   Pulmonary/Chest: Breath sounds normal. No respiratory distress. She has no wheezes. She has no rhonchi. She has no rales.   Abdominal: Abdomen is soft. She exhibits no distension. There is abdominal tenderness.   Left-sided abdominal and suprapubic tenderness to palpation. No CVA tenderness.  There is no rebound and no guarding.   Musculoskeletal:      Cervical back: Normal range of motion.     Neurological: She is alert and oriented to person, place, and time. She  has normal strength. GCS eye subscore is 4. GCS verbal subscore is 5. GCS motor subscore is 6.   Skin: Skin is warm.   Psychiatric: She has a normal mood and affect. Her speech is normal and behavior is normal. Judgment and thought content normal. Cognition and memory are normal.       ED Course   Procedures  Labs Reviewed   URINALYSIS, REFLEX TO URINE CULTURE - Abnormal; Notable for the following components:       Result Value    Protein, UA Trace (*)     Leukocytes, UA 2+ (*)     All other components within normal limits    Narrative:     Specimen Source->Urine   URINALYSIS MICROSCOPIC - Abnormal; Notable for the following components:    WBC, UA 10 (*)     All other components within normal limits    Narrative:     Specimen Source->Urine   CBC W/ AUTO DIFFERENTIAL   BASIC METABOLIC PANEL          Imaging Results              CT Renal Stone Study ABD Pelvis WO (In process)                      Medications   0.9%  NaCl infusion (has no administration in time range)   0.9%  NaCl infusion (1,000 mLs Intravenous New Bag 1/19/23 0406)   morphine injection 2 mg (2 mg Intravenous Given 1/19/23 0405)   promethazine (PHENERGAN) 12.5 mg in dextrose 5 % 50 mL IVPB (0 mg Intravenous Stopped 1/19/23 0425)     Medical Decision Making:   History:   Old Medical Records: I decided to obtain old medical records.  Clinical Tests:   Lab Tests: Ordered and Reviewed  Radiological Study: Reviewed and Ordered  Additional MDM:   Comments: 62-year-old female presents complaining left-sided back pain that radiates anteriorly to her abdomen similar to her previous renal colic.  She also reports associated nausea.  On exam she had no CVA tenderness but she did have left-sided abdominal tenderness to palpation.  Vital signs within normal limits.  Urinalysis negative for UTI.  Lab work including CBC, BMP within normal limits.  CT renal stone protocol was also obtained and negative for an acute intra-abdominal process.  Etiology of the patient's  left-sided abdominal pain/back pain is unclear at this time, however, I do feel the patient is appropriate for discharge home to follow-up with her PCP for further evaluation.  She was given indications for seeking re-evaluation the emergency department and will be discharged home with a prescription for Zofran and naproxen..      Scribe Attestation:   Scribe #1: I performed the above scribed service and the documentation accurately describes the services I performed. I attest to the accuracy of the note.          Physician Attestation for Scribe: I, Kristyn Leonard  , reviewed documentation as scribed in my presence, which is both accurate and complete.           Clinical Impression:   Final diagnoses:  [R11.0] Nausea (Primary)  [M54.50] Acute left-sided low back pain without sciatica  [R10.9] Left sided abdominal pain of unknown cause        ED Disposition Condition    Discharge Stable          ED Prescriptions       Medication Sig Dispense Start Date End Date Auth. Provider    promethazine (PHENERGAN) 25 MG tablet Take 1 tablet (25 mg total) by mouth every 6 (six) hours as needed for Nausea. 15 tablet 1/19/2023 -- Kristyn Leonard MD    HYDROcodone-acetaminophen (NORCO) 5-325 mg per tablet Take 1 tablet by mouth every 8 (eight) hours as needed for Pain (Severe pain). 9 tablet 1/19/2023 1/22/2023 Kristyn Leonard MD          Follow-up Information       Follow up With Specialties Details Why Contact Info    Daniela Pearce MD Family Medicine Schedule an appointment as soon as possible for a visit   [facility] [address] [address]  [phone]               Kristyn Leonard MD  01/19/23 0604

## 2023-03-30 ENCOUNTER — HOSPITAL ENCOUNTER (EMERGENCY)
Facility: OTHER | Age: 63
Discharge: HOME | End: 2023-03-30
Attending: EMERGENCY MEDICINE
Payer: MEDICARE

## 2023-03-30 VITALS
OXYGEN SATURATION: 100 % | TEMPERATURE: 98 F | HEART RATE: 66 BPM | BODY MASS INDEX: 28.35 KG/M2 | SYSTOLIC BLOOD PRESSURE: 117 MMHG | HEIGHT: 63 IN | RESPIRATION RATE: 16 BRPM | WEIGHT: 160 LBS | DIASTOLIC BLOOD PRESSURE: 75 MMHG

## 2023-03-30 DIAGNOSIS — H43.399 VITREOUS FLOATERS, UNSPECIFIED LATERALITY: ICD-10-CM

## 2023-03-30 DIAGNOSIS — M25.512 ACUTE PAIN OF LEFT SHOULDER: Primary | ICD-10-CM

## 2023-03-30 DIAGNOSIS — R51.9 LEFT-SIDED HEADACHE: ICD-10-CM
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
## 2023-03-30 DIAGNOSIS — M54.50 ACUTE BILATERAL LOW BACK PAIN WITHOUT SCIATICA: ICD-10-CM

## 2023-03-30 PROCEDURE — 25000003 PHARM REV CODE 250: Performed by: PHYSICIAN ASSISTANT

## 2023-03-30 PROCEDURE — 99284 EMERGENCY DEPT VISIT MOD MDM: CPT | Mod: 25

## 2023-03-30 RX ORDER — TIZANIDINE 4 MG/1
4 TABLET ORAL EVERY 6 HOURS PRN
Qty: 12 TABLET | Refills: 0 | Status: SHIPPED | OUTPATIENT
Start: 2023-03-30 | End: 2023-03-30 | Stop reason: SDUPTHER

## 2023-03-30 RX ORDER — METHOCARBAMOL 500 MG/1
1000 TABLET, FILM COATED ORAL
Status: COMPLETED | OUTPATIENT
Start: 2023-03-30 | End: 2023-03-30

## 2023-03-30 RX ORDER — ACETAMINOPHEN 325 MG/1
650 TABLET ORAL
Status: COMPLETED | OUTPATIENT
Start: 2023-03-30 | End: 2023-03-30

## 2023-03-30 RX ORDER — TIZANIDINE 4 MG/1
4 TABLET ORAL EVERY 6 HOURS PRN
Qty: 12 TABLET | Refills: 0 | Status: SHIPPED | OUTPATIENT
Start: 2023-03-30 | End: 2023-04-02

## 2023-03-30 RX ORDER — LIDOCAINE 50 MG/G
1 PATCH TOPICAL
Status: DISCONTINUED | OUTPATIENT
Start: 2023-03-30 | End: 2023-03-30 | Stop reason: HOSPADM

## 2023-03-30 RX ADMIN — METHOCARBAMOL 1000 MG: 500 TABLET ORAL at 03:03

## 2023-03-30 RX ADMIN — ACETAMINOPHEN 650 MG: 325 TABLET, FILM COATED ORAL at 03:03

## 2023-03-30 RX ADMIN — LIDOCAINE 1 PATCH: 50 PATCH CUTANEOUS at 03:03
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
## 2023-03-30 NOTE — Clinical Note
"Rosmery Agustin"Vance was seen and treated in our emergency department on 3/30/2023.  She may return to work on 03/31/2023.       If you have any questions or concerns, please don't hesitate to call.      MOLLY Sy"
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
## 2023-03-30 NOTE — DISCHARGE INSTRUCTIONS
Take Tylenol 650 mg every 6-8 hours for pain.  You have been prescribed tizanidine as a muscle relaxer to help with shoulder and back pain, this has been called into your [facility] pharmacy.  Follow up closely with a primary care physician.  You should also follow-up with ophthalmology eye doctor, Neurology concussion Clinic, Orthopedics.  Return to the ER for any change or worsening of symptoms.

## 2023-05-25 ENCOUNTER — HOSPITAL ENCOUNTER (OUTPATIENT)
Facility: OTHER | Age: 63
Discharge: HOME | End: 2023-05-27
Attending: EMERGENCY MEDICINE | Admitting: EMERGENCY MEDICINE
Payer: MEDICARE

## 2023-05-25 DIAGNOSIS — R47.9 SPEECH PROBLEM: Primary | ICD-10-CM

## 2023-05-25 DIAGNOSIS — K51.90 ULCERATIVE COLITIS WITHOUT COMPLICATIONS, UNSPECIFIED LOCATION: ICD-10-CM

## 2023-05-25 DIAGNOSIS — R29.90 NEUROLOGICAL COMPLAINT: ICD-10-CM

## 2023-05-25 DIAGNOSIS — R29.90 MULTIPLE NEUROLOGICAL SYMPTOMS: ICD-10-CM

## 2023-05-25 DIAGNOSIS — N13.2 HYDRONEPHROSIS WITH URINARY OBSTRUCTION DUE TO RENAL CALCULUS: ICD-10-CM

## 2023-05-25 DIAGNOSIS — I63.9 STROKE: ICD-10-CM

## 2023-05-25 LAB
ALBUMIN SERPL BCP-MCNC: 4.5 G/DL (ref 3.5–5.2)
ALP SERPL-CCNC: 83 U/L (ref 55–135)
ALT SERPL W/O P-5'-P-CCNC: 36 U/L (ref 10–44)
ANION GAP SERPL CALC-SCNC: 9 MMOL/L (ref 8–16)
AST SERPL-CCNC: 32 U/L (ref 10–40)
BASOPHILS # BLD AUTO: 0.04 K/UL (ref 0–0.2)
BASOPHILS NFR BLD: 0.4 % (ref 0–1.9)
BILIRUB SERPL-MCNC: 0.6 MG/DL (ref 0.1–1)
BUN SERPL-MCNC: 11 MG/DL (ref 8–23)
CALCIUM SERPL-MCNC: 10.4 MG/DL (ref 8.7–10.5)
CHLORIDE SERPL-SCNC: 103 MMOL/L (ref 95–110)
CHOLEST SERPL-MCNC: 192 MG/DL (ref 120–199)
CHOLEST/HDLC SERPL: 3.9 {RATIO} (ref 2–5)
CO2 SERPL-SCNC: 28 MMOL/L (ref 23–29)
CREAT SERPL-MCNC: 1 MG/DL (ref 0.5–1.4)
CREAT SERPL-MCNC: 1 MG/DL (ref 0.5–1.4)
DIFFERENTIAL METHOD: ABNORMAL
EOSINOPHIL # BLD AUTO: 0.2 K/UL (ref 0–0.5)
EOSINOPHIL NFR BLD: 1.7 % (ref 0–8)
ERYTHROCYTE [DISTWIDTH] IN BLOOD BY AUTOMATED COUNT: 14.5 % (ref 11.5–14.5)
EST. GFR  (NO RACE VARIABLE): >60 ML/MIN/1.73 M^2
GLUCOSE SERPL-MCNC: 120 MG/DL (ref 70–110)
HCT VFR BLD AUTO: 42.9 % (ref 37–48.5)
HDLC SERPL-MCNC: 49 MG/DL (ref 40–75)
HDLC SERPL: 25.5 % (ref 20–50)
HGB BLD-MCNC: 14.3 G/DL (ref 12–16)
IMM GRANULOCYTES # BLD AUTO: 0.02 K/UL (ref 0–0.04)
IMM GRANULOCYTES NFR BLD AUTO: 0.2 % (ref 0–0.5)
INR PPP: 1 (ref 0.8–1.2)
LDLC SERPL CALC-MCNC: 103.6 MG/DL (ref 63–159)
LYMPHOCYTES # BLD AUTO: 4.2 K/UL (ref 1–4.8)
LYMPHOCYTES NFR BLD: 38.3 % (ref 18–48)
MCH RBC QN AUTO: 28.6 PG (ref 27–31)
MCHC RBC AUTO-ENTMCNC: 33.3 G/DL (ref 32–36)
MCV RBC AUTO: 86 FL (ref 82–98)
MONOCYTES # BLD AUTO: 0.6 K/UL (ref 0.3–1)
MONOCYTES NFR BLD: 5.1 % (ref 4–15)
NEUTROPHILS # BLD AUTO: 6 K/UL (ref 1.8–7.7)
NEUTROPHILS NFR BLD: 54.3 % (ref 38–73)
NONHDLC SERPL-MCNC: 143 MG/DL
NRBC BLD-RTO: 0 /100 WBC
PLATELET # BLD AUTO: 457 K/UL (ref 150–450)
PMV BLD AUTO: 10.1 FL (ref 9.2–12.9)
POC PTINR: 1.4 (ref 0.9–1.2)
POCT GLUCOSE: 120 MG/DL (ref 70–110)
POTASSIUM SERPL-SCNC: 3.2 MMOL/L (ref 3.5–5.1)
PROT SERPL-MCNC: 9 G/DL (ref 6–8.4)
PROTHROMBIN TIME: 10.8 SEC (ref 9–12.5)
RBC # BLD AUTO: 5 M/UL (ref 4–5.4)
SAMPLE: ABNORMAL
SAMPLE: NORMAL
SODIUM SERPL-SCNC: 140 MMOL/L (ref 136–145)
TRIGL SERPL-MCNC: 197 MG/DL (ref 30–150)
TSH SERPL DL<=0.005 MIU/L-ACNC: 1.4 UIU/ML (ref 0.4–4)
WBC # BLD AUTO: 11.03 K/UL (ref 3.9–12.7)

## 2023-05-25 PROCEDURE — 63600175 PHARM REV CODE 636 W HCPCS

## 2023-05-25 PROCEDURE — 84443 ASSAY THYROID STIM HORMONE: CPT | Performed by: EMERGENCY MEDICINE

## 2023-05-25 PROCEDURE — G0378 HOSPITAL OBSERVATION PER HR: HCPCS

## 2023-05-25 PROCEDURE — 85025 COMPLETE CBC W/AUTO DIFF WBC: CPT | Performed by: EMERGENCY MEDICINE

## 2023-05-25 PROCEDURE — 82962 GLUCOSE BLOOD TEST: CPT

## 2023-05-25 PROCEDURE — 99223 1ST HOSP IP/OBS HIGH 75: CPT | Mod: ,,,

## 2023-05-25 PROCEDURE — 99223 PR INITIAL HOSPITAL CARE,LEVL III: ICD-10-PCS | Mod: ,,,

## 2023-05-25 PROCEDURE — 36415 COLL VENOUS BLD VENIPUNCTURE: CPT | Performed by: EMERGENCY MEDICINE

## 2023-05-25 PROCEDURE — 25000003 PHARM REV CODE 250: Performed by: EMERGENCY MEDICINE

## 2023-05-25 PROCEDURE — 93005 ELECTROCARDIOGRAM TRACING: CPT

## 2023-05-25 PROCEDURE — 99285 EMERGENCY DEPT VISIT HI MDM: CPT | Mod: 25

## 2023-05-25 PROCEDURE — 80061 LIPID PANEL: CPT | Performed by: EMERGENCY MEDICINE

## 2023-05-25 PROCEDURE — 80053 COMPREHEN METABOLIC PANEL: CPT | Performed by: EMERGENCY MEDICINE

## 2023-05-25 PROCEDURE — 85610 PROTHROMBIN TIME: CPT | Performed by: EMERGENCY MEDICINE

## 2023-05-25 PROCEDURE — 25000003 PHARM REV CODE 250

## 2023-05-25 PROCEDURE — 25500020 PHARM REV CODE 255: Performed by: EMERGENCY MEDICINE

## 2023-05-25 PROCEDURE — 93010 EKG 12-LEAD: ICD-10-PCS | Mod: ,,, | Performed by: INTERNAL MEDICINE

## 2023-05-25 PROCEDURE — 96372 THER/PROPH/DIAG INJ SC/IM: CPT

## 2023-05-25 PROCEDURE — 93010 ELECTROCARDIOGRAM REPORT: CPT | Mod: ,,, | Performed by: INTERNAL MEDICINE

## 2023-05-25 RX ORDER — SODIUM CHLORIDE 0.9 % (FLUSH) 0.9 %
10 SYRINGE (ML) INJECTION
Status: DISCONTINUED | OUTPATIENT
Start: 2023-05-25 | End: 2023-05-27 | Stop reason: HOSPADM

## 2023-05-25 RX ORDER — PROCHLORPERAZINE EDISYLATE 5 MG/ML
5 INJECTION INTRAMUSCULAR; INTRAVENOUS EVERY 6 HOURS PRN
Status: DISCONTINUED | OUTPATIENT
Start: 2023-05-25 | End: 2023-05-27 | Stop reason: HOSPADM

## 2023-05-25 RX ORDER — ZOLPIDEM TARTRATE 5 MG/1
10 TABLET ORAL NIGHTLY PRN
Status: DISCONTINUED | OUTPATIENT
Start: 2023-05-25 | End: 2023-05-27 | Stop reason: HOSPADM

## 2023-05-25 RX ORDER — DIPHENOXYLATE HYDROCHLORIDE AND ATROPINE SULFATE 2.5; .025 MG/1; MG/1
1 TABLET ORAL 4 TIMES DAILY PRN
Status: DISCONTINUED | OUTPATIENT
Start: 2023-05-25 | End: 2023-05-27 | Stop reason: HOSPADM

## 2023-05-25 RX ORDER — OXYCODONE HYDROCHLORIDE 5 MG/1
5 TABLET ORAL EVERY 6 HOURS PRN
Status: DISCONTINUED | OUTPATIENT
Start: 2023-05-25 | End: 2023-05-27 | Stop reason: HOSPADM

## 2023-05-25 RX ORDER — ALPRAZOLAM 0.5 MG/1
1 TABLET ORAL
Status: COMPLETED | OUTPATIENT
Start: 2023-05-25 | End: 2023-05-25

## 2023-05-25 RX ORDER — ENOXAPARIN SODIUM 100 MG/ML
40 INJECTION SUBCUTANEOUS EVERY 24 HOURS
Status: DISCONTINUED | OUTPATIENT
Start: 2023-05-25 | End: 2023-05-27 | Stop reason: HOSPADM

## 2023-05-25 RX ORDER — ASPIRIN 81 MG/1
81 TABLET ORAL DAILY
Status: DISCONTINUED | OUTPATIENT
Start: 2023-05-26 | End: 2023-05-26

## 2023-05-25 RX ORDER — HYDROXYZINE HYDROCHLORIDE 25 MG/1
25 TABLET, FILM COATED ORAL NIGHTLY
Status: DISCONTINUED | OUTPATIENT
Start: 2023-05-25 | End: 2023-05-27 | Stop reason: HOSPADM

## 2023-05-25 RX ORDER — CLOPIDOGREL BISULFATE 75 MG/1
75 TABLET ORAL DAILY
Status: DISCONTINUED | OUTPATIENT
Start: 2023-05-26 | End: 2023-05-26

## 2023-05-25 RX ORDER — ALPRAZOLAM 0.5 MG/1
1 TABLET ORAL 3 TIMES DAILY PRN
Status: DISCONTINUED | OUTPATIENT
Start: 2023-05-25 | End: 2023-05-26

## 2023-05-25 RX ORDER — GABAPENTIN 100 MG/1
200 CAPSULE ORAL NIGHTLY
Status: DISCONTINUED | OUTPATIENT
Start: 2023-05-25 | End: 2023-05-27 | Stop reason: HOSPADM

## 2023-05-25 RX ORDER — PANTOPRAZOLE SODIUM 40 MG/1
40 TABLET, DELAYED RELEASE ORAL DAILY
Status: DISCONTINUED | OUTPATIENT
Start: 2023-05-25 | End: 2023-05-27 | Stop reason: HOSPADM

## 2023-05-25 RX ORDER — TALC
6 POWDER (GRAM) TOPICAL NIGHTLY PRN
Status: DISCONTINUED | OUTPATIENT
Start: 2023-05-25 | End: 2023-05-25

## 2023-05-25 RX ORDER — ATORVASTATIN CALCIUM 20 MG/1
40 TABLET, FILM COATED ORAL DAILY
Status: DISCONTINUED | OUTPATIENT
Start: 2023-05-25 | End: 2023-05-27 | Stop reason: HOSPADM

## 2023-05-25 RX ORDER — DIPHENHYDRAMINE HCL 25 MG
25 CAPSULE ORAL EVERY 6 HOURS PRN
Status: DISCONTINUED | OUTPATIENT
Start: 2023-05-25 | End: 2023-05-27 | Stop reason: HOSPADM

## 2023-05-25 RX ORDER — POLYETHYLENE GLYCOL 3350 17 G/17G
17 POWDER, FOR SOLUTION ORAL DAILY
Status: DISCONTINUED | OUTPATIENT
Start: 2023-05-26 | End: 2023-05-27 | Stop reason: HOSPADM

## 2023-05-25 RX ORDER — ACETAMINOPHEN 500 MG
1000 TABLET ORAL EVERY 8 HOURS PRN
Status: DISCONTINUED | OUTPATIENT
Start: 2023-05-25 | End: 2023-05-27 | Stop reason: HOSPADM

## 2023-05-25 RX ADMIN — ALPRAZOLAM 1 MG: 0.5 TABLET ORAL at 11:05

## 2023-05-25 RX ADMIN — HYDROXYZINE HYDROCHLORIDE 25 MG: 25 TABLET ORAL at 11:05

## 2023-05-25 RX ADMIN — PANTOPRAZOLE SODIUM 40 MG: 40 TABLET, DELAYED RELEASE ORAL at 11:05

## 2023-05-25 RX ADMIN — IOHEXOL 100 ML: 350 INJECTION, SOLUTION INTRAVENOUS at 03:05

## 2023-05-25 RX ADMIN — ENOXAPARIN SODIUM 40 MG: 40 INJECTION SUBCUTANEOUS at 11:05

## 2023-05-25 RX ADMIN — GABAPENTIN 200 MG: 100 CAPSULE ORAL at 11:05

## 2023-05-25 RX ADMIN — ALPRAZOLAM 1 MG: 0.5 TABLET ORAL at 06:05

## 2023-05-25 RX ADMIN — ATORVASTATIN CALCIUM 40 MG: 20 TABLET, FILM COATED ORAL at 11:05

## 2023-05-25 NOTE — Clinical Note
Diagnosis: Stroke [958226]   Future Attending Provider: RYLAN BABIN [[id number]]   Admitting Provider:: IRENE MAHER [[id number]]   Special Needs:: No Special Needs [1]
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
## 2023-05-26 ENCOUNTER — TELEPHONE (OUTPATIENT)
Dept: INTERNAL MEDICINE | Facility: CLINIC | Age: 63
End: 2023-05-26
Payer: MEDICARE

## 2023-05-26 PROBLEM — R29.90 MULTIPLE NEUROLOGICAL SYMPTOMS: Status: ACTIVE | Noted: 2023-05-25

## 2023-05-26 PROBLEM — M54.2 NECK PAIN ON LEFT SIDE: Chronic | Status: ACTIVE | Noted: 2023-05-26

## 2023-05-26 PROBLEM — R29.818 TRANSIENT NEUROLOGICAL SYMPTOMS: Status: ACTIVE | Noted: 2023-05-25

## 2023-05-26 LAB
ALBUMIN SERPL BCP-MCNC: 3.7 G/DL (ref 3.5–5.2)
ALP SERPL-CCNC: 70 U/L (ref 55–135)
ALT SERPL W/O P-5'-P-CCNC: 25 U/L (ref 10–44)
ANION GAP SERPL CALC-SCNC: 9 MMOL/L (ref 8–16)
ASCENDING AORTA: 2.82 CM
AST SERPL-CCNC: 22 U/L (ref 10–40)
AV INDEX (PROSTH): 0.78
AV MEAN GRADIENT: 3 MMHG
AV PEAK GRADIENT: 5 MMHG
AV REGURGITATION PRESSURE HALF TIME: 761 MS
AV VALVE AREA: 2.75 CM2
AV VELOCITY RATIO: 0.7
BASOPHILS # BLD AUTO: 0.06 K/UL (ref 0–0.2)
BASOPHILS NFR BLD: 0.6 % (ref 0–1.9)
BILIRUB SERPL-MCNC: 0.5 MG/DL (ref 0.1–1)
BSA FOR ECHO PROCEDURE: 1.77 M2
BUN SERPL-MCNC: 14 MG/DL (ref 8–23)
CALCIUM SERPL-MCNC: 9.3 MG/DL (ref 8.7–10.5)
CHLORIDE SERPL-SCNC: 104 MMOL/L (ref 95–110)
CHOLEST SERPL-MCNC: 155 MG/DL (ref 120–199)
CHOLEST/HDLC SERPL: 4 {RATIO} (ref 2–5)
CO2 SERPL-SCNC: 26 MMOL/L (ref 23–29)
CREAT SERPL-MCNC: 0.8 MG/DL (ref 0.5–1.4)
CV ECHO LV RWT: 0.49 CM
DIFFERENTIAL METHOD: ABNORMAL
DOP CALC AO PEAK VEL: 1.14 M/S
DOP CALC AO VTI: 22.2 CM
DOP CALC LVOT AREA: 3.5 CM2
DOP CALC LVOT DIAMETER: 2.12 CM
DOP CALC LVOT PEAK VEL: 0.8 M/S
DOP CALC LVOT STROKE VOLUME: 61.04 CM3
DOP CALC RVOT PEAK VEL: 0.74 M/S
DOP CALC RVOT VTI: 13.6 CM
DOP CALCLVOT PEAK VEL VTI: 17.3 CM
E WAVE DECELERATION TIME: 236.4 MSEC
E/A RATIO: 0.62
E/E' RATIO: 9.56 M/S
ECHO LV POSTERIOR WALL: 0.96 CM (ref 0.6–1.1)
EJECTION FRACTION: 60 %
EOSINOPHIL # BLD AUTO: 0.3 K/UL (ref 0–0.5)
EOSINOPHIL NFR BLD: 3.2 % (ref 0–8)
ERYTHROCYTE [DISTWIDTH] IN BLOOD BY AUTOMATED COUNT: 14.4 % (ref 11.5–14.5)
EST. GFR  (NO RACE VARIABLE): >60 ML/MIN/1.73 M^2
ESTIMATED AVG GLUCOSE: 114 MG/DL (ref 68–131)
FRACTIONAL SHORTENING: 35 % (ref 28–44)
GLUCOSE SERPL-MCNC: 104 MG/DL (ref 70–110)
HBA1C MFR BLD: 5.6 % (ref 4–5.6)
HCT VFR BLD AUTO: 36.5 % (ref 37–48.5)
HDLC SERPL-MCNC: 39 MG/DL (ref 40–75)
HDLC SERPL: 25.2 % (ref 20–50)
HGB BLD-MCNC: 12.1 G/DL (ref 12–16)
IMM GRANULOCYTES # BLD AUTO: 0.02 K/UL (ref 0–0.04)
IMM GRANULOCYTES NFR BLD AUTO: 0.2 % (ref 0–0.5)
INTERVENTRICULAR SEPTUM: 0.97 CM (ref 0.6–1.1)
IVC DIAMETER: 0.8 CM
IVRT: 117.98 MSEC
LA MAJOR: 4.6 CM
LA MINOR: 4.08 CM
LA WIDTH: 4 CM
LDLC SERPL CALC-MCNC: 86.4 MG/DL (ref 63–159)
LEFT ATRIUM SIZE: 3.25 CM
LEFT ATRIUM VOLUME INDEX MOD: 21.3 ML/M2
LEFT ATRIUM VOLUME INDEX: 27.5 ML/M2
LEFT ATRIUM VOLUME MOD: 37 CM3
LEFT ATRIUM VOLUME: 47.78 CM3
LEFT INTERNAL DIMENSION IN SYSTOLE: 2.56 CM (ref 2.1–4)
LEFT VENTRICLE DIASTOLIC VOLUME INDEX: 39 ML/M2
LEFT VENTRICLE DIASTOLIC VOLUME: 67.86 ML
LEFT VENTRICLE MASS INDEX: 68 G/M2
LEFT VENTRICLE SYSTOLIC VOLUME INDEX: 13.6 ML/M2
LEFT VENTRICLE SYSTOLIC VOLUME: 23.7 ML
LEFT VENTRICULAR INTERNAL DIMENSION IN DIASTOLE: 3.95 CM (ref 3.5–6)
LEFT VENTRICULAR MASS: 118.47 G
LV LATERAL E/E' RATIO: 10.75 M/S
LV SEPTAL E/E' RATIO: 8.6 M/S
LVOT MG: 1.41 MMHG
LVOT MV: 0.57 CM/S
LYMPHOCYTES # BLD AUTO: 4 K/UL (ref 1–4.8)
LYMPHOCYTES NFR BLD: 43 % (ref 18–48)
MAGNESIUM SERPL-MCNC: 2 MG/DL (ref 1.6–2.6)
MCH RBC QN AUTO: 28.3 PG (ref 27–31)
MCHC RBC AUTO-ENTMCNC: 33.2 G/DL (ref 32–36)
MCV RBC AUTO: 85 FL (ref 82–98)
MONOCYTES # BLD AUTO: 0.6 K/UL (ref 0.3–1)
MONOCYTES NFR BLD: 6.7 % (ref 4–15)
MV PEAK A VEL: 0.69 M/S
MV PEAK E VEL: 0.43 M/S
MV STENOSIS PRESSURE HALF TIME: 68.56 MS
MV VALVE AREA P 1/2 METHOD: 3.21 CM2
NEUTROPHILS # BLD AUTO: 4.3 K/UL (ref 1.8–7.7)
NEUTROPHILS NFR BLD: 46.3 % (ref 38–73)
NONHDLC SERPL-MCNC: 116 MG/DL
NRBC BLD-RTO: 0 /100 WBC
PHOSPHATE SERPL-MCNC: 4.4 MG/DL (ref 2.7–4.5)
PISA AR MAX VEL: 2.66 M/S
PISA MRMAX VEL: 2.03 M/S
PISA TR MAX VEL: 1.93 M/S
PLATELET # BLD AUTO: 397 K/UL (ref 150–450)
PMV BLD AUTO: 10.5 FL (ref 9.2–12.9)
POCT GLUCOSE: 100 MG/DL (ref 70–110)
POCT GLUCOSE: 178 MG/DL (ref 70–110)
POTASSIUM SERPL-SCNC: 2.9 MMOL/L (ref 3.5–5.1)
PROT SERPL-MCNC: 7.4 G/DL (ref 6–8.4)
PULM VEIN S/D RATIO: 1.75
PV MEAN GRADIENT: 1.08 MMHG
PV PEAK D VEL: 0.28 M/S
PV PEAK S VEL: 0.49 M/S
PV PEAK VELOCITY: 0.77 CM/S
RA MAJOR: 3.55 CM
RA PRESSURE: 3 MMHG
RA WIDTH: 3.5 CM
RBC # BLD AUTO: 4.28 M/UL (ref 4–5.4)
RIGHT VENTRICULAR END-DIASTOLIC DIMENSION: 3 CM
RV TISSUE DOPPLER FREE WALL SYSTOLIC VELOCITY 1 (APICAL 4 CHAMBER VIEW): 11 CM/S
SINUS: 3.1 CM
SODIUM SERPL-SCNC: 139 MMOL/L (ref 136–145)
STJ: 2.93 CM
TDI LATERAL: 0.04 M/S
TDI SEPTAL: 0.05 M/S
TDI: 0.05 M/S
TR MAX PG: 15 MMHG
TRICUSPID ANNULAR PLANE SYSTOLIC EXCURSION: 1.9 CM
TRIGL SERPL-MCNC: 148 MG/DL (ref 30–150)
TSH SERPL DL<=0.005 MIU/L-ACNC: 0.92 UIU/ML (ref 0.4–4)
TV REST PULMONARY ARTERY PRESSURE: 18 MMHG
WBC # BLD AUTO: 9.35 K/UL (ref 3.9–12.7)

## 2023-05-26 PROCEDURE — 96372 THER/PROPH/DIAG INJ SC/IM: CPT | Mod: 59

## 2023-05-26 PROCEDURE — 84100 ASSAY OF PHOSPHORUS: CPT

## 2023-05-26 PROCEDURE — 97162 PT EVAL MOD COMPLEX 30 MIN: CPT

## 2023-05-26 PROCEDURE — 63600175 PHARM REV CODE 636 W HCPCS

## 2023-05-26 PROCEDURE — 94761 N-INVAS EAR/PLS OXIMETRY MLT: CPT

## 2023-05-26 PROCEDURE — G0427 PR INPT TELEHEALTH CON 70/>M: ICD-10-PCS | Mod: 95,,, | Performed by: NURSE PRACTITIONER

## 2023-05-26 PROCEDURE — 83735 ASSAY OF MAGNESIUM: CPT

## 2023-05-26 PROCEDURE — A9585 GADOBUTROL INJECTION: HCPCS | Performed by: INTERNAL MEDICINE

## 2023-05-26 PROCEDURE — 85025 COMPLETE CBC W/AUTO DIFF WBC: CPT

## 2023-05-26 PROCEDURE — G0378 HOSPITAL OBSERVATION PER HR: HCPCS

## 2023-05-26 PROCEDURE — 92610 EVALUATE SWALLOWING FUNCTION: CPT

## 2023-05-26 PROCEDURE — 25000003 PHARM REV CODE 250: Performed by: PHYSICIAN ASSISTANT

## 2023-05-26 PROCEDURE — 84443 ASSAY THYROID STIM HORMONE: CPT

## 2023-05-26 PROCEDURE — 83036 HEMOGLOBIN GLYCOSYLATED A1C: CPT

## 2023-05-26 PROCEDURE — G0427 INPT/ED TELECONSULT70: HCPCS | Mod: GT,GC,, | Performed by: STUDENT IN AN ORGANIZED HEALTH CARE EDUCATION/TRAINING PROGRAM

## 2023-05-26 PROCEDURE — G0427 INPT/ED TELECONSULT70: HCPCS | Mod: 95,,, | Performed by: NURSE PRACTITIONER

## 2023-05-26 PROCEDURE — 99233 PR SUBSEQUENT HOSPITAL CARE,LEVL III: ICD-10-PCS | Mod: ,,, | Performed by: PHYSICIAN ASSISTANT

## 2023-05-26 PROCEDURE — 92523 SPEECH SOUND LANG COMPREHEN: CPT

## 2023-05-26 PROCEDURE — 25000003 PHARM REV CODE 250

## 2023-05-26 PROCEDURE — 80061 LIPID PANEL: CPT

## 2023-05-26 PROCEDURE — 97530 THERAPEUTIC ACTIVITIES: CPT

## 2023-05-26 PROCEDURE — G0427 PR INPT TELEHEALTH CON 70/>M: ICD-10-PCS | Mod: GT,GC,, | Performed by: STUDENT IN AN ORGANIZED HEALTH CARE EDUCATION/TRAINING PROGRAM

## 2023-05-26 PROCEDURE — 80053 COMPREHEN METABOLIC PANEL: CPT

## 2023-05-26 PROCEDURE — 25500020 PHARM REV CODE 255: Performed by: INTERNAL MEDICINE

## 2023-05-26 PROCEDURE — 36415 COLL VENOUS BLD VENIPUNCTURE: CPT

## 2023-05-26 PROCEDURE — 99233 SBSQ HOSP IP/OBS HIGH 50: CPT | Mod: ,,, | Performed by: PHYSICIAN ASSISTANT

## 2023-05-26 RX ORDER — POTASSIUM CHLORIDE 20 MEQ/1
40 TABLET, EXTENDED RELEASE ORAL ONCE
Status: COMPLETED | OUTPATIENT
Start: 2023-05-26 | End: 2023-05-26

## 2023-05-26 RX ORDER — POTASSIUM CHLORIDE 20 MEQ/1
20 TABLET, EXTENDED RELEASE ORAL DAILY
Status: DISCONTINUED | OUTPATIENT
Start: 2023-05-26 | End: 2023-05-26

## 2023-05-26 RX ORDER — LIDOCAINE 50 MG/G
1 PATCH TOPICAL
Status: DISCONTINUED | OUTPATIENT
Start: 2023-05-26 | End: 2023-05-27 | Stop reason: HOSPADM

## 2023-05-26 RX ORDER — ASPIRIN 81 MG/1
81 TABLET ORAL DAILY
Status: DISCONTINUED | OUTPATIENT
Start: 2023-05-26 | End: 2023-05-27 | Stop reason: HOSPADM

## 2023-05-26 RX ORDER — CLOPIDOGREL BISULFATE 75 MG/1
75 TABLET ORAL DAILY
Status: DISCONTINUED | OUTPATIENT
Start: 2023-05-26 | End: 2023-05-27

## 2023-05-26 RX ORDER — GADOBUTROL 604.72 MG/ML
7 INJECTION INTRAVENOUS
Status: COMPLETED | OUTPATIENT
Start: 2023-05-26 | End: 2023-05-26

## 2023-05-26 RX ORDER — ALPRAZOLAM 0.5 MG/1
1 TABLET ORAL 2 TIMES DAILY PRN
Status: DISCONTINUED | OUTPATIENT
Start: 2023-05-26 | End: 2023-05-27 | Stop reason: HOSPADM

## 2023-05-26 RX ADMIN — ZOLPIDEM TARTRATE 10 MG: 5 TABLET ORAL at 01:05

## 2023-05-26 RX ADMIN — LIDOCAINE 1 PATCH: 50 PATCH CUTANEOUS at 04:05

## 2023-05-26 RX ADMIN — GABAPENTIN 200 MG: 100 CAPSULE ORAL at 09:05

## 2023-05-26 RX ADMIN — ACETAMINOPHEN 1000 MG: 500 TABLET, FILM COATED ORAL at 05:05

## 2023-05-26 RX ADMIN — ALPRAZOLAM 1 MG: 0.5 TABLET ORAL at 07:05

## 2023-05-26 RX ADMIN — ALPRAZOLAM 1 MG: 0.5 TABLET ORAL at 09:05

## 2023-05-26 RX ADMIN — POTASSIUM CHLORIDE 40 MEQ: 1500 TABLET, EXTENDED RELEASE ORAL at 10:05

## 2023-05-26 RX ADMIN — GADOBUTROL 7 ML: 604.72 INJECTION INTRAVENOUS at 08:05

## 2023-05-26 RX ADMIN — CLOPIDOGREL BISULFATE 75 MG: 75 TABLET ORAL at 04:05

## 2023-05-26 RX ADMIN — ZOLPIDEM TARTRATE 10 MG: 5 TABLET ORAL at 11:05

## 2023-05-26 RX ADMIN — POLYETHYLENE GLYCOL 3350 17 G: 17 POWDER, FOR SOLUTION ORAL at 10:05

## 2023-05-26 RX ADMIN — ASPIRIN 81 MG: 81 TABLET, COATED ORAL at 04:05

## 2023-05-26 RX ADMIN — OXYCODONE HYDROCHLORIDE 5 MG: 5 TABLET ORAL at 11:05

## 2023-05-26 RX ADMIN — ATORVASTATIN CALCIUM 40 MG: 20 TABLET, FILM COATED ORAL at 10:05

## 2023-05-26 RX ADMIN — DIPHENHYDRAMINE HYDROCHLORIDE 25 MG: 25 CAPSULE ORAL at 12:05

## 2023-05-26 RX ADMIN — PANTOPRAZOLE SODIUM 40 MG: 40 TABLET, DELAYED RELEASE ORAL at 10:05

## 2023-05-26 RX ADMIN — ENOXAPARIN SODIUM 40 MG: 40 INJECTION SUBCUTANEOUS at 05:05

## 2023-05-26 RX ADMIN — THERA TABS 1 TABLET: TAB at 10:05

## 2023-05-26 RX ADMIN — DIPHENHYDRAMINE HYDROCHLORIDE 25 MG: 25 CAPSULE ORAL at 07:05

## 2023-05-26 NOTE — ASSESSMENT & PLAN NOTE
"Hx of Anxiety, depression, insomnia  Patient reports recent multiple family stressors. Developed expressive aphasia with intractable jerking in all limbs this morning with weakness. Reports being given alprazolam 1mg in ED with improvement in aphasia. Reports have not slept in 3 days. Reports being easily frightened with sudden benign movements of objects such as "curtain moving". Reports feeling like she is not going to recover with this current hospitalization, feels like "I am going to die". Denies SI/HI or hallucinations.    - Resume alprazolam 1mg TID PRN for anxiety  - Add hydroxyzine 25mg qhs for anxiety and sleep in addition to home dose zolpidem 10mg   - Patient reports celexa makes her feel like zombie, so she doesn't take. Might benefit from telepsych consult inpatient. Consult placed.  "

## 2023-05-26 NOTE — PROGRESS NOTES
"[facility] S[facility] ([facility] Hospital Medicine  Progress Note    Patient Name: Rosmery Woodard  MRN: [id number]  Patient Class: OP- Observation   Admission Date: 5/25/2023  Length of Stay: 0 days  Attending Physician: Chase Lopez MD  Primary Care Provider: Primary Doctor No        Subjective:     Principal Problem:Transient neurological symptoms        HPI:  Rosmery Woodard is a 63 year old lady with hx of GERD, ulcerative colitis, migraines, depression, anxiety presenting to the ED for complains of increasing difficulty in expressing words, posterior headache, generalized weakness, vertigo for the past 3 days. Patient reports calling PCP office and was told to come to ED. Patient reports that today she was unable to get out of bed, and was having intractable shaking in all limbs. Patient reports recent stressors in family. Patient is compliant to all prescribed medication. Patient reports "I feel like I am not going to make it home this time.".     Afebrile, HR 90, /82, Pox 99% on RA. No leukocytosis. Plt 457. K 3.2. . Na 140. CTA Head and Neck with no acute abnormality. No large vessel occlusion. Code stroke activated. No neurology note nor orders.   Patient reports expressive aphasia somewhat improved after PO alprazolam, although still evident. Reports posterior headache 8/10 severity, constant. Patient reports change in vision "I can't make out the faces on the TV" but able to see features of both myself and bedside nurse in room. Patient reports persistent dizziness, ?explanation for vision change (also wears eye glasses but reports this is different). Passed bedside stack swallow rest with thin fluids. 3/5 motor function in right upper and lower extremities, reduced sensation in right upper and lower extremities. EOM normal, no facial asymmetry.     Patient is admitted to hospital medicine for 3 day acute onset of expressive aphasia, vertigo, posterior headache and right upper and lower extremity " weakness.       Overview/Hospital Course:  Rosmery Woodard was admitted for difficulty with speech, stroke work up underway. CTA head and neck and MRI brain without acute abnormalities. Psychiatry and neurology consulted. Suspect anxiety contributing to presentation. Vascular neuro eval pending      Interval History: Seen at bedside, reports improvement in speech since yesterday. She intermittently stutters. Denies difficulty swallowing. She has been facing a lot of life stressors at home and feels overwhelmed. She is agreeable to psychiatry consult    Review of Systems   Constitutional:  Positive for activity change and fatigue.   Eyes:  Positive for visual disturbance.   Cardiovascular:  Negative for chest pain.   Gastrointestinal:  Negative for abdominal pain, nausea and vomiting.   Genitourinary:  Negative for difficulty urinating.   Musculoskeletal:  Positive for neck pain. Negative for back pain.   Neurological:  Positive for speech difficulty, weakness, numbness and headaches.   Psychiatric/Behavioral:  Positive for sleep disturbance. The patient is nervous/anxious.    Objective:     Vital Signs (Most Recent):  Temp: 97.8 °F (36.6 °C) (05/26/23 1140)  Pulse: 70 (05/26/23 1208)  Resp: 16 (05/26/23 1140)  BP: 110/79 (05/26/23 1140)  SpO2: 100 % (05/26/23 1140) Vital Signs (24h Range):  Temp:  [97.8 °F (36.6 °C)-98.6 °F (37 °C)] 97.8 °F (36.6 °C)  Pulse:  [46-90] 70  Resp:  [15-24] 16  SpO2:  [95 %-100 %] 100 %  BP: (110-193)/() 110/79     Weight: 70.8 kg (156 lb)  Body mass index is 27.63 kg/m².  No intake or output data in the 24 hours ending 05/26/23 1320      Physical Exam  Vitals and nursing note reviewed.   Constitutional:       General: She is not in acute distress.     Appearance: She is not ill-appearing.      Comments: Appears anxious   HENT:      Head: Normocephalic and atraumatic.   Eyes:      General:         Right eye: No discharge.         Left eye: No discharge.      Extraocular Movements:  Extraocular movements intact.   Pulmonary:      Effort: Pulmonary effort is normal. No respiratory distress.   Abdominal:      General: Abdomen is flat.      Palpations: Abdomen is soft.      Tenderness: There is no abdominal tenderness.   Musculoskeletal:      Cervical back: Tenderness (left trapezius) present. No rigidity.   Skin:     General: Skin is warm and dry.   Neurological:      General: No focal deficit present.      Mental Status: She is alert and oriented to person, place, and time.      Cranial Nerves: No facial asymmetry.      Sensory: Sensory deficit (right upper extremity) present.      Motor: No weakness.      Comments: Intermittently stuttering speech, no facial drooping; moving all limbs spontaneously   Psychiatric:         Attention and Perception: She does not perceive auditory or visual hallucinations.         Mood and Affect: Mood is anxious. Affect is tearful.         Behavior: Behavior is cooperative.         Thought Content: Thought content is not paranoid. Thought content does not include suicidal plan.           Significant Labs: All pertinent labs within the past 24 hours have been reviewed.    Significant Imaging: I have reviewed all pertinent imaging results/findings within the past 24 hours.      Assessment/Plan:      * Transient neurological symptoms  Presents with difficulty in expressing her words for 3 days, dizziness with vertiginous symptoms, generalized weakness, posterior headache 8/10, vision changes, worsening. Reduced motor function and sensation in right upper and lower extremity. No dysphagia, no facial droop. Expressive aphasia.   CTA Head and Neck with no acute abnormality. No large vessel occlusion.     Antithrombotics for secondary stroke prevention: Antiplatelets: Aspirin: 81 mg daily  Clopidogrel: 75 mg daily  Anticoagulants: SC enoxaparin 40mg daily    Statins for secondary stroke prevention and hyperlipidemia, if present:   Statins: Atorvastatin- 40 mg daily,  "increased from atorvastatin 10mg daily    Aggressive risk factor modification: HTN, HLD, situational stressors happening in family     Rehab efforts: The patient has been evaluated by a stroke team provider and the therapy needs have been fully considered based off the presenting complaints and exam findings. The following therapy evaluations are needed: PT evaluate and treat, OT evaluate and treat, SLP evaluate and treat    Diagnostics ordered/pending: CTA Head to assess vasculature , HgbA1C to assess blood glucose levels, Lipid Profile to assess cholesterol levels, MRI head without contrast to assess brain parenchyma, TTE to assess cardiac function/status , TSH to assess thyroid function: lipid panel, a1c and TSH all reassuring    VTE prophylaxis: Enoxaparin 40 mg SQ every 24 hours    BP parameters: Infarct: No intervention, SBP <220  Hold home dose amlodipine 10mg daily    Pending:   - MRI Brain unremarkable  - Echo pending  - Consult to stroke rounding and psychiatry  - suspect anixety contributing   - PT, OT, SLP evaluation    Neck pain on left side  Chronic. Moderate pain to left side. On oxycodone-acetaminophen 5-325mg Q6 PRN at home    - Lidocaine 5% patch to left side of neck  - Acetaminophen 1g Q8 PRN, oxycodone 5mg Q6 PRN    Anxiety  Hx of Anxiety, depression, insomnia  Patient reports recent multiple family stressors. Developed expressive aphasia with intractable jerking in all limbs this morning with weakness. Reports being given alprazolam 1mg in ED with improvement in aphasia. Reports have not slept in 3 days. Reports being easily frightened with sudden benign movements of objects such as "curtain moving". Reports feeling like she is not going to recover with this current hospitalization, feels like "I am going to die". Denies SI/HI or hallucinations.    - Resume alprazolam 1mg TID PRN for anxiety-  verified  -- Patient reports celexa makes her feel like zombie, so she doesn't take. Might benefit " from telepsych consult inpatient. Consult placed:  Ok to dc home once medically ready per psych, continue home regimen of Xanax 1mg PO TID. Worrisome dosing however per collateral, patient has done well on this medications after multiple trials of other psychotropics. Would advise contacting patient's current outpatient psychiatrist about an ability for an outpatient apptmt upon discharge for close monitoring of benzodiazepine dispensing. She may benefit from a longer acting benzodiazepine with concurrent antidepressant administration but will leave ultimate decision up to outpatient psychiatrist.    Ulcerative colitis  Chronic. Controlled. No diarrheal episodes today.     - Resume home dose lomotil QID PRN       Erosive gastritis  Chronic. Resume home dose pantoprazole 40mg daily        VTE Risk Mitigation (From admission, onward)         Ordered     enoxaparin injection 40 mg  Daily         05/25/23 2234     IP VTE LOW RISK PATIENT  Once         05/25/23 2238     Place sequential compression device  Until discontinued         05/25/23 2113                Discharge Planning   GERONIMO:      Code Status: Full Code   Is the patient medically ready for discharge?:     Reason for patient still in hospital (select all that apply): Patient trending condition  Discharge Plan A:  (TBD)                  Lashawn Merchant PA-C  Department of Hospital Medicine   [facility] ([place])

## 2023-05-26 NOTE — PLAN OF CARE
Problem: Physical Therapy  Goal: Physical Therapy Goal  Description: Goals to be met by: 23    Patient will increase functional independence with mobility by performin. LE strength of at least 3+/5 to allow for improved stability with standing activities.   2. Gait x 300 feet with supervision with LRAD.  3. Ascend/descend 4 step(s) with least restrictive assistive device and uni HR with supervision.   4. Gait speed of 1.2 m/s to indicate normal gait speed.   Outcome: Ongoing, Progressing     Patient evaluated by PT and goals established. Patient presents with weakness and coordination impairments during formal assessment that do not appear to correlate with impairments during functional mobility. Irregular gait instability with no AD requiring SBA to ensure safety. Further balance and gait training limited d/t c/o headache. PT will continue to follow and progress as tolerated. Rec for dc TBD, anticipate to home with OP PT pending progress and OT/SLP input. Please see progress note for detailed plan of care and recommendations.

## 2023-05-26 NOTE — ASSESSMENT & PLAN NOTE
Presents with difficulty in expressing her words for 3 days, dizziness with vertiginous symptoms, generalized weakness, posterior headache 8/10, vision changes, worsening. Reduced motor function and sensation in right upper and lower extremity. No dysphagia, no facial droop. Expressive aphasia.   CTA Head and Neck with no acute abnormality. No large vessel occlusion.  Code stroke called from ED. No neuro note, no orders.      Antithrombotics for secondary stroke prevention: Antiplatelets: Aspirin: 81 mg daily  Clopidogrel: 75 mg daily  Anticoagulants: SC enoxaparin 40mg daily    Statins for secondary stroke prevention and hyperlipidemia, if present:   Statins: Atorvastatin- 40 mg daily, increased from atorvastatin 10mg daily    Aggressive risk factor modification: HTN, HLD, situational stressors happening in family     Rehab efforts: The patient has been evaluated by a stroke team provider and the therapy needs have been fully considered based off the presenting complaints and exam findings. The following therapy evaluations are needed: PT evaluate and treat, OT evaluate and treat, SLP evaluate and treat    Diagnostics ordered/pending: CTA Head to assess vasculature , HgbA1C to assess blood glucose levels, Lipid Profile to assess cholesterol levels, MRI head without contrast to assess brain parenchyma, TTE to assess cardiac function/status , TSH to assess thyroid function    VTE prophylaxis: Enoxaparin 40 mg SQ every 24 hours    BP parameters: Infarct: No intervention, SBP <220  Hold home dose amlodipine 10mg daily    Pending:   - MRI Brain  - Echo  - Consult to stroke rounding  - PT, OT, SLP evaluation

## 2023-05-26 NOTE — SUBJECTIVE & OBJECTIVE
"Past Medical History:   Diagnosis Date    Asthma     Hypertension     Liver abscess     hx    Portal vein thrombosis 10/2013    Portal vein thrombosis 10/2013    Ulcerative colitis 9/15/2013       Past Surgical History:   Procedure Laterality Date    ABDOMINAL SURGERY      3 exploratory surgeries    APPENDECTOMY       SECTION, LOW TRANSVERSE      3 C-sections    CHOLECYSTECTOMY      COLONOSCOPY      COLONOSCOPY N/A 2016    Procedure: COLONOSCOPY;  Surgeon: Brad King MD;  Location: [address]);  Service: Endoscopy;  Laterality: N/A;    EXPLORATORY LAPAROTOMY W/ BOWEL RESECTION      HERNIA REPAIR      HYSTERECTOMY      INCISIONAL HERNIA REPAIR      UPPER GASTROINTESTINAL ENDOSCOPY      URETERAL STENT PLACEMENT Left 2022    Procedure: INSERTION, STENT, URETER;  Surgeon: Mann Geronimo MD;  Location: [facility];  Service: Urology;  Laterality: Left;       Review of patient's allergies indicates:   Allergen Reactions    Versed [midazolam] Nausea And Vomiting    Amlodipine Other (See Comments)     Patient says that she does not do well with generic amlodipine but does do well with Norvasc (amlodipine).    Bentyl [dicyclomine] Hives    Darvocet a500 [propoxyphene n-acetaminophen] Hives    Toradol [ketorolac] Hives    Zofran [ondansetron hcl (pf)] Nausea And Vomiting    Morphine Nausea And Vomiting     22 @ 0935 Pt, stated "I take Dilaudid without any reactions to it" pt also states that she "can take oxycodone for pain."        No current facility-administered medications on file prior to encounter.     Current Outpatient Medications on File Prior to Encounter   Medication Sig    ALPRAZolam (XANAX) 1 MG tablet Take 1 mg by mouth 3 (three) times daily as needed for Anxiety.    amLODIPine (NORVASC) 10 MG tablet Take 10 mg by mouth once daily.    atorvastatin (LIPITOR) 10 MG tablet Take 10 mg by mouth once daily.    diphenhydrAMINE (BENADRYL) 25 mg capsule Take 25 mg by mouth " every 6 (six) hours as needed for Itching.    diphenoxylate-atropine 2.5-0.025 mg (LOMOTIL) 2.5-0.025 mg per tablet Take 1 tablet by mouth 4 (four) times daily as needed for Diarrhea.    gabapentin (NEURONTIN) 100 MG capsule 200 mg every evening.    multivitamin (THERAGRAN) per tablet Take 1 tablet by mouth once daily.     oxyCODONE-acetaminophen (PERCOCET) 5-325 mg per tablet Take 1 tablet by mouth every 6 (six) hours as needed for Pain.    promethazine (PHENERGAN) 25 MG tablet Take 1 tablet (25 mg total) by mouth every 8 (eight) hours as needed for Nausea.    promethazine (PHENERGAN) 25 MG tablet Take 1 tablet (25 mg total) by mouth every 6 (six) hours as needed for Nausea.    zolpidem (AMBIEN) 10 mg Tab Take 10 mg by mouth every evening.    omeprazole (PRILOSEC) 20 MG capsule Take 20 mg by mouth 2 (two) times daily.     oxybutynin (DITROPAN) 5 MG Tab Take 1 tablet (5 mg total) by mouth 3 (three) times daily as needed (Bladder spasms).    tamsulosin (FLOMAX) 0.4 mg Cap Take 1 capsule (0.4 mg total) by mouth every evening. for 7 days     Family History       Problem Relation (Age of Onset)    Breast cancer Maternal Aunt, Cousin    Cirrhosis Maternal Aunt    Colon cancer Maternal Uncle    Hypertension Maternal Grandmother          Tobacco Use    Smoking status: Former     Packs/day: 0.10     Years: 0.50     Pack years: 0.05     Types: Cigarettes     Quit date: 1976     Years since quittin.7    Smokeless tobacco: Never   Substance and Sexual Activity    Alcohol use: Yes     Alcohol/week: 1.0 standard drink     Types: 1 Glasses of wine per week     Comment: rare    Drug use: No    Sexual activity: Never     Birth control/protection: Abstinence     Review of Systems   Constitutional:  Positive for activity change and fatigue. Negative for chills and fever.   HENT:  Negative for congestion and sore throat.    Eyes:  Positive for visual disturbance. Negative for pain.   Respiratory:  Negative for cough and  shortness of breath.    Cardiovascular:  Negative for chest pain and leg swelling.   Gastrointestinal:  Negative for abdominal pain, constipation, nausea and vomiting.   Genitourinary:  Negative for difficulty urinating and dysuria.   Musculoskeletal:  Positive for neck pain. Negative for back pain.   Skin:  Negative for rash and wound.   Neurological:  Positive for speech difficulty, weakness, numbness and headaches. Negative for light-headedness.   Psychiatric/Behavioral:  Positive for sleep disturbance. The patient is nervous/anxious.    Objective:     Vital Signs (Most Recent):  Temp: 98.1 °F (36.7 °C) (05/26/23 0010)  Pulse: (!) 46 (05/26/23 0256)  Resp: 18 (05/26/23 0010)  BP: 126/73 (05/26/23 0010)  SpO2: 96 % (05/26/23 0010) Vital Signs (24h Range):  Temp:  [97.8 °F (36.6 °C)-98.1 °F (36.7 °C)] 98.1 °F (36.7 °C)  Pulse:  [46-90] 46  Resp:  [15-24] 18  SpO2:  [95 %-100 %] 96 %  BP: (112-193)/() 126/73     Weight: 71.2 kg (156 lb 15.5 oz)  Body mass index is 27.81 kg/m².     Physical Exam  Vitals and nursing note reviewed.   Constitutional:       General: She is not in acute distress.     Appearance: She is not ill-appearing.      Comments: Appears anxious   HENT:      Head: Normocephalic and atraumatic.      Nose: No congestion or rhinorrhea.   Eyes:      General: No scleral icterus.        Right eye: No discharge.         Left eye: No discharge.      Extraocular Movements: Extraocular movements intact.   Pulmonary:      Effort: Pulmonary effort is normal.      Breath sounds: Normal breath sounds.   Abdominal:      Palpations: Abdomen is soft.      Tenderness: There is no abdominal tenderness.   Musculoskeletal:      Cervical back: Tenderness (left trapezius) present. No rigidity.   Neurological:      Mental Status: She is alert and oriented to person, place, and time. Mental status is at baseline.      Cranial Nerves: No facial asymmetry.      Sensory: Sensory deficit (right upper extremity) present.       Motor: Weakness (right upper extremity) present.   Psychiatric:         Attention and Perception: She does not perceive auditory or visual hallucinations.         Mood and Affect: Mood is anxious. Affect is tearful.         Behavior: Behavior is cooperative.         Thought Content: Thought content is not paranoid. Thought content does not include suicidal plan.              Significant Labs: All pertinent labs within the past 24 hours have been reviewed.  A1C: No results for input(s): HGBA1C in the last 4320 hours.  BMP:   Recent Labs   Lab 05/25/23  1552   *      K 3.2*      CO2 28   BUN 11   CREATININE 1.0   CALCIUM 10.4     CBC:   Recent Labs   Lab 05/25/23  1552   WBC 11.03   HGB 14.3   HCT 42.9   *     CMP:   Recent Labs   Lab 05/25/23  1552      K 3.2*      CO2 28   *   BUN 11   CREATININE 1.0   CALCIUM 10.4   PROT 9.0*   ALBUMIN 4.5   BILITOT 0.6   ALKPHOS 83   AST 32   ALT 36   ANIONGAP 9     Cardiac Markers: No results for input(s): CKMB, MYOGLOBIN, BNP, TROPISTAT in the last 48 hours.  Coagulation:   Recent Labs   Lab 05/25/23  1559   INR 1.4*     Lipid Panel:   Recent Labs   Lab 05/25/23  1552   CHOL 192   HDL 49   LDLCALC 103.6   TRIG 197*   CHOLHDL 25.5     Magnesium: No results for input(s): MG in the last 48 hours.  POCT Glucose:   Recent Labs   Lab 05/25/23  1547   POCTGLUCOSE 120*     Troponin: No results for input(s): TROPONINI, TROPONINIHS in the last 48 hours.  TSH:   Recent Labs   Lab 05/25/23  1552   TSH 1.398     Urine Culture: No results for input(s): LABURIN in the last 48 hours.  Urine Studies: No results for input(s): COLORU, APPEARANCEUA, PHUR, SPECGRAV, PROTEINUA, GLUCUA, KETONESU, BILIRUBINUA, OCCULTUA, NITRITE, UROBILINOGEN, LEUKOCYTESUR, RBCUA, WBCUA, BACTERIA, SQUAMEPITHEL, HYALINECASTS in the last 48 hours.    Invalid input(s): ALYSHA    Significant Imaging: I have reviewed and interpreted all pertinent imaging results/findings  within the past 24 hours.

## 2023-05-26 NOTE — CONSULTS
"RD consulted for Cardiac Diet Education once diet advanced. Patient receiving a regular diet. Limited education provided d/t patient reports being dizzy, appeared to be falling asleep while talking. RD stated will follow up with patient. As RD was walking out the door, patient responds "that's it?" Again, attempted to provide diet education, but patient would fall asleep while talking. RD again stated to patient a follow up. Left handout by belongings and patient voiced understanding. RD to follow up.    "

## 2023-05-26 NOTE — PLAN OF CARE
Patient was admitted during the shift. She appeared a lot anxious during assessment, she was AOX4, NSS stable, SATs were stable on room air. Her anxiety and agitation was  treated with Alprazolam, Benadryl and Zolpidem at intervals as per MAR. She also complained of pain, Gabapentin was given, she was also encouraged to practise some deep breathing, lights were deemed and quiet room maintained with good effect, she was able to sleep afterwards. Purposeful rounding was done, safety measures completed. She has commode at bedside, bed low and locked, call light within reach. Will continue to monitor.    Problem: Adult Inpatient Plan of Care  Goal: Plan of Care Review  Outcome: Ongoing, Progressing  Goal: Patient-Specific Goal (Individualized)  Outcome: Ongoing, Progressing  Goal: Absence of Hospital-Acquired Illness or Injury  Outcome: Ongoing, Progressing  Goal: Optimal Comfort and Wellbeing  Outcome: Ongoing, Progressing  Goal: Readiness for Transition of Care  Outcome: Ongoing, Progressing     Problem: Adjustment to Illness (Stroke, Ischemic/Transient Ischemic Attack)  Goal: Optimal Coping  Outcome: Ongoing, Progressing     Problem: Bowel Elimination Impaired (Stroke, Ischemic/Transient Ischemic Attack)  Goal: Effective Bowel Elimination  Outcome: Ongoing, Progressing     Problem: Cerebral Tissue Perfusion (Stroke, Ischemic/Transient Ischemic Attack)  Goal: Optimal Cerebral Tissue Perfusion  Outcome: Ongoing, Progressing     Problem: Cognitive Impairment (Stroke, Ischemic/Transient Ischemic Attack)  Goal: Optimal Cognitive Function  Outcome: Ongoing, Progressing     Problem: Functional Ability Impaired (Stroke, Ischemic/Transient Ischemic Attack)  Goal: Optimal Functional Ability  Outcome: Ongoing, Progressing

## 2023-05-26 NOTE — TELEPHONE ENCOUNTER
"----- Message from Debra Travis sent at 5/26/2023 10:14 AM CDT -----  Regarding: Appointment  "Type:  Patient Call Back    Who Called:Martin     What is the reqeust in detail:Requesting call back to schedule an appointment pt just had a stroke and will be discharge on 5/27. Please advise    Can the clinic reply by [facility]?no    Best Call Back Number:[phone]      Additional Information:            "

## 2023-05-26 NOTE — HPI
"62 y/o female with HTN, HLD, ulcerative colitis, migraines, depression, anxiety presented to the ED with difficulty expressing words, HA, generalized weakness and dizziness for the past 3 days.  She reported on admit to having intractable shaking in all limbs yesterday morning.  Patient also reports difficulty "understanding."  She has had vision changes, fatigue, sleep disturbances, anxiety. She has never felt like this before.  She has no history of stroke or TIA.      "

## 2023-05-26 NOTE — SUBJECTIVE & OBJECTIVE
Past Medical History:   Diagnosis Date    Asthma     Hypertension     Liver abscess     hx    Portal vein thrombosis 10/2013    Portal vein thrombosis 10/2013    Ulcerative colitis 9/15/2013       Past Surgical History:   Procedure Laterality Date    ABDOMINAL SURGERY      3 exploratory surgeries    APPENDECTOMY       SECTION, LOW TRANSVERSE      3 C-sections    CHOLECYSTECTOMY      COLONOSCOPY      COLONOSCOPY N/A 2016    Procedure: COLONOSCOPY;  Surgeon: Brad King MD;  Location: [facility] ([address] [place] [address]);  Service: Endoscopy;  Laterality: N/A;    EXPLORATORY LAPAROTOMY W/ BOWEL RESECTION      HERNIA REPAIR      HYSTERECTOMY      INCISIONAL HERNIA REPAIR      UPPER GASTROINTESTINAL ENDOSCOPY      URETERAL STENT PLACEMENT Left 2022    Procedure: INSERTION, STENT, URETER;  Surgeon: Mann Geronimo MD;  Location: [facility];  Service: Urology;  Laterality: Left;       Family History   Problem Relation Age of Onset    Hypertension Maternal Grandmother     Cirrhosis Maternal Aunt     Breast cancer Maternal Aunt     Colon cancer Maternal Uncle     Breast cancer Cousin     Celiac disease Neg Hx     Colon polyps Neg Hx     Crohn's disease Neg Hx     Cystic fibrosis Neg Hx     Esophageal cancer Neg Hx     Hemochromatosis Neg Hx     Inflammatory bowel disease Neg Hx     Irritable bowel syndrome Neg Hx     Liver cancer Neg Hx     Liver disease Neg Hx     Rectal cancer Neg Hx     Stomach cancer Neg Hx     Ulcerative colitis Neg Hx     Scott's disease Neg Hx        Social History     Socioeconomic History    Marital status:    Occupational History    Occupation: disabled    Tobacco Use    Smoking status: Former     Packs/day: 0.10     Years: 0.50     Pack years: 0.05     Types: Cigarettes     Quit date: 1976     Years since quittin.7    Smokeless tobacco: Never   Substance and Sexual Activity    Alcohol use: Yes     Alcohol/week: 1.0 standard drink     Types: 1 Glasses of wine  per week     Comment: rare    Drug use: No    Sexual activity: Never     Birth control/protection: Abstinence   Social History Narrative    5 daughters, disabled     Social Determinants of Health     Financial Resource Strain: Medium Risk    Difficulty of Paying Living Expenses: Somewhat hard   Food Insecurity: Food Insecurity Present    Worried About Running Out of Food in the Last Year: Sometimes true    Ran Out of Food in the Last Year: Sometimes true   Transportation Needs: Unmet Transportation Needs    Lack of Transportation (Medical): Yes    Lack of Transportation (Non-Medical): Yes   Physical Activity: Inactive    Days of Exercise per Week: 0 days    Minutes of Exercise per Session: 0 min   Stress: Stress Concern Present    Feeling of Stress : Very much   Social Connections: Socially Isolated    Frequency of Social Gatherings with Friends and Family: Three times a week    Attends Orthodoxy Services: Never    Active Member of Clubs or Organizations: No    Attends Club or Organization Meetings: Never    Marital Status:    Housing Stability: High Risk    Unable to Pay for Housing in the Last Year: Yes    Number of Places Lived in the Last Year: 1    Unstable Housing in the Last Year: No       Current Facility-Administered Medications   Medication Dose Route Frequency Provider Last Rate Last Admin    acetaminophen tablet 1,000 mg  1,000 mg Oral Q8H PRN Demetriouzhen Chad, NP        ALPRAZolam tablet 1 mg  1 mg Oral TID PRN Shuzhen Chad, NP   1 mg at 05/26/23 0745    aspirin EC tablet 81 mg  81 mg Oral Daily Shuzhen Chad, NP   81 mg at 05/26/23 0405    atorvastatin tablet 40 mg  40 mg Oral Daily Shuzhen Chad, NP   40 mg at 05/25/23 2334    clopidogreL tablet 75 mg  75 mg Oral Daily Shuzhen Chad, NP   75 mg at 05/26/23 0405    diphenhydrAMINE capsule 25 mg  25 mg Oral Q6H PRN Shuzhen Chad, NP   25 mg at 05/26/23 0745    diphenoxylate-atropine 2.5-0.025 mg per tablet 1 tablet  1 tablet Oral QID PRN Bib Dewitte,  "NP        enoxaparin injection 40 mg  40 mg Subcutaneous Daily Shuzhen Chad, NP   40 mg at 05/25/23 2334    gabapentin capsule 200 mg  200 mg Oral QHS Shuzhen Chad, NP   200 mg at 05/25/23 2334    hydrOXYzine HCL tablet 25 mg  25 mg Oral QHS Shuzhen Chad, NP   25 mg at 05/25/23 2334    LIDOcaine 5 % patch 1 patch  1 patch Transdermal Q24H Shuzhen Chad, NP   1 patch at 05/26/23 0405    multivitamin tablet  1 tablet Oral Daily Shuzhen Chad, NP        oxyCODONE immediate release tablet 5 mg  5 mg Oral Q6H PRN Shuzhen Chad, NP        pantoprazole EC tablet 40 mg  40 mg Oral Daily Shuzhen Chad, NP   40 mg at 05/25/23 2334    polyethylene glycol packet 17 g  17 g Oral Daily Shuzhen Chad, NP        potassium chloride SA CR tablet 40 mEq  40 mEq Oral Once Lashawn Merchant PA-C        prochlorperazine injection Soln 5 mg  5 mg Intravenous Q6H PRN Shuzhen Chad, NP        sodium chloride 0.9% bolus 500 mL 500 mL  500 mL Intravenous Continuous PRN Shuzhen Chad, NP        sodium chloride 0.9% flush 10 mL  10 mL Intravenous PRN Kesha Arroyo MD        sodium chloride 0.9% flush 10 mL  10 mL Intravenous PRN Shuzhen Chad, NP        zolpidem tablet 10 mg  10 mg Oral Nightly PRN Shuzhen Chad, NP   10 mg at 05/26/23 0111     Home and current medications reviewed    Review of patient's allergies indicates:   Allergen Reactions    Versed [midazolam] Nausea And Vomiting    Amlodipine Other (See Comments)     Patient says that she does not do well with generic amlodipine but does do well with Norvasc (amlodipine).    Bentyl [dicyclomine] Hives    Darvocet a500 [propoxyphene n-acetaminophen] Hives    Toradol [ketorolac] Hives    Zofran [ondansetron hcl (pf)] Nausea And Vomiting    Morphine Nausea And Vomiting     12/7/22 @ 0935 Pt, stated "I take Dilaudid without any reactions to it" pt also states that she "can take oxycodone for pain."          Review of Systems   Constitutional:  Positive for fatigue. Negative for activity change " "and fever.   HENT:  Negative for drooling and trouble swallowing.    Eyes:  Positive for visual disturbance.   Respiratory:  Negative for cough and shortness of breath.    Cardiovascular:  Negative for chest pain and palpitations.   Gastrointestinal:  Negative for abdominal pain, diarrhea, nausea and vomiting.   Genitourinary:  Negative for difficulty urinating and dysuria.   Musculoskeletal:  Positive for neck pain.   Skin:  Negative for color change and pallor.   Neurological:  Positive for dizziness, speech difficulty, weakness, light-headedness, numbness and headaches.   Psychiatric/Behavioral:  Positive for decreased concentration and sleep disturbance. The patient is nervous/anxious.    Objective:     Vitals:    05/26/23 0402 05/26/23 0608 05/26/23 0650 05/26/23 1024   BP: 124/65  124/65    BP Location: Right arm      Patient Position: Lying      Pulse: 61 64 64    Resp: 18      Temp: 98.6 °F (37 °C)      TempSrc: Oral      SpO2: 98%   98%   Weight:   70.8 kg (156 lb)    Height:   5' 3" (1.6 m)          Physical Exam  Constitutional:       General: She is not in acute distress.  HENT:      Head: Normocephalic.      Right Ear: External ear normal.      Left Ear: External ear normal.      Nose: Nose normal.   Pulmonary:      Effort: Pulmonary effort is normal. No respiratory distress.   Abdominal:      General: There is no distension.      Tenderness: There is no guarding.   Musculoskeletal:      Right lower leg: No edema.      Left lower leg: No edema.   Skin:     General: Skin is dry.   Neurological:      Mental Status: She is alert.   Psychiatric:         Speech: Speech is delayed.         Behavior: Behavior is slowed.       Neurological Exam  LOC: alert - required a lot of encouragement to complete parts of the exam  Attention Span: Good   Language: No aphasia - able to describe picture; able to name items; able to read sentences; answered questions appropriately; patient did have episodes of stuttering " speech not typical of aphasia  Articulation: No dysarthria  Orientation: Person, Place, Time   Visual Fields: Full  EOM (CN III, IV, VI): Full/intact  Facial Sensation (CN V): Facial sensory loss  Facial Movement (CN VII): Symmetric facial expression    Motor: Arm left    Full antigravity; Full power noted with nurse assistance  Leg left    Full antigravity; Full power noted with nurse assistance  Arm right    Full antigravity; Full power noted with nurse assistance  Leg right   Full antigravity; Full power noted with nurse assistance  Cerebellum: No evidence of appendicular or axial ataxia  Sensation: Micah-hypoesthesia right    Diagnostic Results     Brain Imaging   5/26/2023 MRI brain reviewed and independently interpreted by me.  No diffusion restriction.  No acute hemorrhage. Remote infarct right cerebellum.  Chronic white matter disease.    Vessel Imaging   5/25/2023 CTA head and neck reviewed and independently interpreted by me.  No high-grade stenosis or occlusion.  No significant intracranial atherosclerotic disease.

## 2023-05-26 NOTE — PT/OT/SLP EVAL
Physical Therapy Evaluation and Treatment    Patient Name:  Rosmery Woodard   MRN:  [id number]    Recommendations:     Discharge Recommendations: outpatient PT (TBD pending progress and SLP/OT input)   Discharge Equipment Recommendations:  (potential cane or rollator)   Barriers to discharge: None    Assessment:     Rosmery Woodard is a 63 y.o. female admitted with a medical diagnosis of Transient neurological symptoms - c/o aphasia and dizziness with weakness. Pmhx pertinent for ulcerative colitis and migraines.  She presents with the following impairments/functional limitations: weakness, gait instability, impaired balance, decreased upper extremity function, decreased lower extremity function, decreased safety awareness, pain, impaired coordination.    Patient evaluated by PT and goals established. Patient presents with weakness and coordination impairments during formal assessment that do not appear to correlate with impairments during functional mobility. Irregular gait instability with no AD requiring SBA to ensure safety. Further balance and gait training limited d/t c/o headache. PT will continue to follow and progress as tolerated. Rec for dc TBD, anticipate to home with OP PT pending progress and OT/SLP input.     Rehab Prognosis: Good; patient would benefit from acute skilled PT services to address these deficits and reach maximum level of function.    Recent Surgery: * No surgery found *      Plan:     During this hospitalization, patient to be seen 4 x/week to address the identified rehab impairments via gait training, therapeutic activities, therapeutic exercises, neuromuscular re-education and progress toward the following goals:    Plan of Care Expires:  06/09/23    Subjective     Chief Complaint: Headache, stomach cramping she attributes to eating too quickly and drinking apple juice  Patient/Family Comments/goals: Goal to get stronger, reports she feels weaker than usual; Patient agreeable to  evaluation.  Pain/Comfort:  Pain Rating 1:  (unrated)  Location 1: head  Pain Addressed 1: Reposition, Distraction, Cessation of Activity, Nurse notified  Pain Rating Post-Intervention 1:  (unreported, reports stomach cramping improved)    Patients cultural, spiritual, Sabianist conflicts given the current situation: no    Living Environment:  Pt lives with her dtr in a single story home with 6 steps to enter and uni handrail(s).   Pt has a walk in shower with a built in bench and a regular toilet.   Upon discharge, patient will have assistance from her dtr, who is out of the house majority of day.  Prior level of function:  Ambulation: Indep  ADL's: Indep  IADLs: Indep  Falls: None reported  Equipment used at home: none.      Objective:     Communicated with JARVIS Willard prior to session.  Patient found HOB elevated with peripheral IV, telemetry  upon PT entry to room.    General Precautions: Standard, fall  Orthopedic Precautions:N/A   Braces: N/A  Respiratory Status: Room air    Patient donned non slip socks and gait belt for OOB mobility.    Exams:  Cognition:   Patient is oriented to person, , place, situation.  Pt follows approximately 100% of one step commands.    Mood: Pleasant and cooperative.  Safety Awareness: Impaired  Musculoskeletal:  BMI: 27.63  Posture:  Forward head  LE ROM/Strength:   R ROM: WFL  L ROM: WFL  R Strength: Inconsistent performance  Hip flexion: 2/5  Knee extension: 2/5  Dorsiflexion: 2/5   L Strength: Inconsistent performance  Hip flexion: 2/5  Knee extension: 2/5  Dorsiflexion: 2/5   Neuromuscular:  Tone/Reflexes: No impairments identified with functional mobility. No formal testing performed.   Coordination:  Heel to shin: Impaired, hypometria in BLE  Finger to Nose: Impaired BUE, hypo and dysmetria noted equally in BUE  Balance:   Static sitting: Good  Static standing: Fair  Dynamic standing: Fair  Visual-vestibular:   Smooth Pursuit: good motion quality, no nystagmus  noted  Saccadic eye movements:  Not smooth motion, possibly d/t difficulty with verbal directions  Integument: Visible skin intact  Cardiopulmonary:  Edema: None noted    Functional Mobility:  Bed Mobility:     Bridging: independence  Supine to Sit: modified independence  Sit to Supine: independence  Transfers:     Sit to Stand:  independence with no AD  Toilet Transfer: supervision with  no AD  using   amb to toilet, stood up from toilet (I)  Gait: 2x12 ft with no Ad and SBA.   Slow gait with decreased step length (B) and decreased foot clearance (unable to assess for paretic features to gait d/t short gait bout).   No knee buckling or tripping over shuffling feet.  Balance:  Dynamic standing at sink x2 min without UE support and occasional trunk support against sink without sway or LOB noted      AM-PAC 6 CLICK MOBILITY  Total Score:21       Treatment & Education:  TA:  Cueing for sleep hygiene and keeping lights on during day for delirium  Discussion of use of DME for increasing gait stability and confidence with DME, increased HA limited trial at this time  Supervision for hand hygiene after toile ting (relieved stomach cramps)  PT educated patient re:   PT plan of care/role of PT  Safety with OOB mobility  Use of DME  Discharge disposition    Pt  verbalized understanding       Patient left HOB elevated with all lines intact, call button in reach, bed alarm on, and RN and OT notified.    GOALS:   Multidisciplinary Problems       Physical Therapy Goals          Problem: Physical Therapy    Goal Priority Disciplines Outcome Goal Variances Interventions   Physical Therapy Goal     PT, PT/OT Ongoing, Progressing     Description: Goals to be met by: 23    Patient will increase functional independence with mobility by performin. LE strength of at least 3+/5 to allow for improved stability with standing activities.   2. Gait x 300 feet with supervision with LRAD.  3. Ascend/descend 4 step(s) with least  restrictive assistive device and uni HR with supervision.   4. Gait speed of 1.2 m/s to indicate normal gait speed.                        History:     Past Medical History:   Diagnosis Date    Asthma     Hypertension     Liver abscess     hx    Portal vein thrombosis 10/2013    Portal vein thrombosis 10/2013    Ulcerative colitis 9/15/2013       Past Surgical History:   Procedure Laterality Date    ABDOMINAL SURGERY      3 exploratory surgeries    APPENDECTOMY       SECTION, LOW TRANSVERSE      3 C-sections    CHOLECYSTECTOMY      COLONOSCOPY      COLONOSCOPY N/A 2016    Procedure: COLONOSCOPY;  Surgeon: Brad King MD;  Location: [address]);  Service: Endoscopy;  Laterality: N/A;    EXPLORATORY LAPAROTOMY W/ BOWEL RESECTION      HERNIA REPAIR      HYSTERECTOMY      INCISIONAL HERNIA REPAIR      UPPER GASTROINTESTINAL ENDOSCOPY      URETERAL STENT PLACEMENT Left 2022    Procedure: INSERTION, STENT, URETER;  Surgeon: Mann Geronimo MD;  Location: [facility];  Service: Urology;  Laterality: Left;       Time Tracking:     PT Received On: 23  PT Start Time: 1337     PT Stop Time: 1357  PT Total Time (min): 20 min     Billable Minutes: Evaluation 12 and Therapeutic Activity 8      2023

## 2023-05-26 NOTE — ASSESSMENT & PLAN NOTE
Chronic. Moderate pain to left side. On oxycodone-acetaminophen 5-325mg Q6 PRN at home    - Lidocaine 5% patch to left side of neck  - Acetaminophen 1g Q8 PRN, oxycodone 5mg Q6 PRN

## 2023-05-26 NOTE — PT/OT/SLP EVAL
Speech Language Pathology Evaluation  Bedside Swallow    Patient Name:  Rosmery Woodard   MRN:  [id number]  Admitting Diagnosis: Multiple neurological symptoms    Recommendations:                 General Recommendations:    Speech to follow 3-5x week for ongoing cognitive communication assessment    Diet recommendations:  Regular Diet - IDDSI Level 7, Thin liquids - IDDSI Level 0   Aspiration Precautions: HOB to 90 degrees, Remain upright 30 minutes post meal, and Standard aspiration precautions   General Precautions: Standard,    Communication strategies:  slow rate of speech    Assessment:     Rosmery Woodard is a 63 y.o. female with an SLP diagnosis of normal oral and pharyngeal swallow, onset of hesitant, telegraphic speech related to stress as per medical chart.  She presents with verbal expression and motor speech deficits. CT and MRI negative for acute stroke.    History:   63 year old female with onset of word finding, hesitant speech, changes in verbal expression x3 days. CT and MRI negative for stroke. Medical records report possible reaction to severe anxiety and stress    Past Medical History:   Diagnosis Date    Asthma     Hypertension     Liver abscess     hx    Portal vein thrombosis 10/2013    Portal vein thrombosis 10/2013    Ulcerative colitis 9/15/2013       Past Surgical History:   Procedure Laterality Date    ABDOMINAL SURGERY      3 exploratory surgeries    APPENDECTOMY       SECTION, LOW TRANSVERSE      3 C-sections    CHOLECYSTECTOMY      COLONOSCOPY      COLONOSCOPY N/A 2016    Procedure: COLONOSCOPY;  Surgeon: Brad King MD;  Location: [facility] ([address] [place] [address]);  Service: Endoscopy;  Laterality: N/A;    EXPLORATORY LAPAROTOMY W/ BOWEL RESECTION      HERNIA REPAIR      HYSTERECTOMY      INCISIONAL HERNIA REPAIR      UPPER GASTROINTESTINAL ENDOSCOPY      URETERAL STENT PLACEMENT Left 2022    Procedure: INSERTION, STENT, URETER;  Surgeon: Mann Geronimo MD;   Location: [facility];  Service: Urology;  Laterality: Left;       Subjective     Pt reports pressure on her chest with swallowing, occasional coughing  Pt reports difficulty speaking and inability to organize thoughts and get words out    Respiratory Status: Room air    Objective:     Oral Musculature Evaluation  Oral Musculature: WNL  Dentition: present and adequate  Secretion Management: adequate  Mucosal Quality: good  Mandibular Strength and Mobility: WNL  Oral Labial Strength and Mobility: WNL  Voice Prior to PO Intake: clear  Oral Musculature Comments: face is symmetrical at rest and during smile, Labial and lingual strength and ROM are WNL for lip seal, midline lingual protrusion, lingual lateralization. Vocal quality is clear. Volume varied between normal and whispering. Speech was 100% intelligible at the conversational level. Groping and extraneous movements of face, lips, head, neck noted    Bedside Swallow Eval:   Consistencies Assessed:  Thin liquids 3 oz water challenge, Corrina swallow protocol  Solids crackers      Oral Phase:   WNL for lip seal, formation of a cohesive bolus and a-p transport  Instances of groping of oral musculature, extraneous movements of head, neck and limbs during attempts to bring cup and straw to mouth    Pharyngeal Phase:   Inconsistent instances of effortful swallow and extraneous movements of head, neck during swallow  No overt signs of airway threat or aspiration on intake of 3 oz of water in slow, steady sips. No signs of airway threat or aspiration on intake of solids  Pt describes instance of sensation of food sticking in chest    COGNITIVE COMMUNICATION STATUS: Formal evaluation of verbal expression attempted, however, pt with onset of significant dysfluencies and inconsistent instances on non fluent speech in response to formal verbal expression tasks. Informal assessment and clinical observation provided:  Pt is alert, cooperative, oriented to person, place, date, and  reason for hospitalization with 100% acc  Able to selectively attend to simple tasks for 26 min session with no redirection required  Able to follow simple 1-3 step commands and follow turns in a conversation with 100% acc  Verbal expression altered between both fluent and non fluent responses, variable verbal expression characteristics throughout session  Primarily halting, telegraphic speech  Hesitancies, dysfluencies, instances of significant paucity of speech  Groping of facial musculature  Extraneous movements of head, neck and limbs when trying to speak  Instances of fluent, simple phrases with no dysfluencies  Variation of volume: moving from normal volume to low volume  Decreased word fluency: able to generate 2-3 items in a simple category before onset of significant facial movements, groping and stammering  Able to express basic wants and needs, participate in a conversation.   Strategies to increase fluency were not effective this session    EDUCATION: Pt endorses significant stress and anxiety in her life at this time. Reports no issues with communication up until 3 days ago. Agreeable to continued evaluation of verbal expression, written expression when feeling less anxious.  Goals:   Multidisciplinary Problems       SLP Goals          Problem: SLP    Goal Priority Disciplines Outcome   SLP Goal     SLP Ongoing, Progressing   Description: 1. Increase word fluency for ability to generate 20 items in a simple, familiar category within 60 secs.                         Plan:     Patient to be seen:  3 x/week, 5 x/week   Plan of Care expires:  06/03/23  Plan of Care reviewed with:  patient (PT)   SLP Follow-Up:  Yes       Discharge recommendations:      Barriers to Discharge:      Time Tracking:     SLP Treatment Date:   05/26/23  Speech Start Time:  1600  Speech Stop Time:  1626     Speech Total Time (min):  26 min    Billable Minutes: Eval 16  and Eval Swallow and Oral Function 10    05/26/2023
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
## 2023-05-26 NOTE — PLAN OF CARE
Problem: SLP  Goal: SLP Goal  Description: 1. Increase word fluency for ability to generate 20 items in a simple, familiar category within 60 secs.    Outcome: Ongoing, Progressing   Oral motor, oral and pharyngeal swallow, verbal expression evaluation initiated this date. CT and MRI results negative for acute stroke process. Symptoms possible stress induced. Will follow patient 3-5x/week for management of communication deficits

## 2023-05-26 NOTE — TELEPHONE ENCOUNTER
Spoke with Martin, pt , to secure a \[facility] [place] [facility]\"" fu appt on 6/9/23 at 10 am with Dr. Don. He stated pt wanted to be seen at [facility].    I stressed the need for a separate appt to establish care with a PCP as she has none.  He verbalized understanding and stated he'd communicate with pt. Scheduled an annual to establish care with Dr. Flynn on 9/15/23 at 8am.

## 2023-05-26 NOTE — ASSESSMENT & PLAN NOTE
"Hx of Anxiety, depression, insomnia  Patient reports recent multiple family stressors. Developed expressive aphasia with intractable jerking in all limbs this morning with weakness. Reports being given alprazolam 1mg in ED with improvement in aphasia. Reports have not slept in 3 days. Reports being easily frightened with sudden benign movements of objects such as "curtain moving". Reports feeling like she is not going to recover with this current hospitalization, feels like "I am going to die". Denies SI/HI or hallucinations.    - Resume alprazolam 1mg TID PRN for anxiety-  verified  -- Patient reports celexa makes her feel like zombie, so she doesn't take. Might benefit from telepsych consult inpatient. Consult placed:  Ok to dc home once medically ready per psych, continue home regimen of Xanax 1mg PO TID. Worrisome dosing however per collateral, patient has done well on this medications after multiple trials of other psychotropics. Would advise contacting patient's current outpatient psychiatrist about an ability for an outpatient apptmt upon discharge for close monitoring of benzodiazepine dispensing. She may benefit from a longer acting benzodiazepine with concurrent antidepressant administration but will leave ultimate decision up to outpatient psychiatrist.  "

## 2023-05-26 NOTE — PT/OT/SLP PROGRESS
Physical Therapy  Not Seen    Patient Name:  Rosmery Woodard   MRN:  [id number]    Patient not seen today secondary to Other (Comment) (tele consult at bedside). Will follow-up later in day.    Addendum 1124: Tele consult at bedside.

## 2023-05-26 NOTE — SUBJECTIVE & OBJECTIVE
Interval History: Seen at bedside, reports improvement in speech since yesterday. She intermittently stutters. Denies difficulty swallowing. She has been facing a lot of life stressors at home and feels overwhelmed. She is agreeable to psychiatry consult    Review of Systems   Constitutional:  Positive for activity change and fatigue.   Eyes:  Positive for visual disturbance.   Cardiovascular:  Negative for chest pain.   Gastrointestinal:  Negative for abdominal pain, nausea and vomiting.   Genitourinary:  Negative for difficulty urinating.   Musculoskeletal:  Positive for neck pain. Negative for back pain.   Neurological:  Positive for speech difficulty, weakness, numbness and headaches.   Psychiatric/Behavioral:  Positive for sleep disturbance. The patient is nervous/anxious.    Objective:     Vital Signs (Most Recent):  Temp: 97.8 °F (36.6 °C) (05/26/23 1140)  Pulse: 70 (05/26/23 1208)  Resp: 16 (05/26/23 1140)  BP: 110/79 (05/26/23 1140)  SpO2: 100 % (05/26/23 1140) Vital Signs (24h Range):  Temp:  [97.8 °F (36.6 °C)-98.6 °F (37 °C)] 97.8 °F (36.6 °C)  Pulse:  [46-90] 70  Resp:  [15-24] 16  SpO2:  [95 %-100 %] 100 %  BP: (110-193)/() 110/79     Weight: 70.8 kg (156 lb)  Body mass index is 27.63 kg/m².  No intake or output data in the 24 hours ending 05/26/23 1320      Physical Exam  Vitals and nursing note reviewed.   Constitutional:       General: She is not in acute distress.     Appearance: She is not ill-appearing.      Comments: Appears anxious   HENT:      Head: Normocephalic and atraumatic.   Eyes:      General:         Right eye: No discharge.         Left eye: No discharge.      Extraocular Movements: Extraocular movements intact.   Pulmonary:      Effort: Pulmonary effort is normal. No respiratory distress.   Abdominal:      General: Abdomen is flat.      Palpations: Abdomen is soft.      Tenderness: There is no abdominal tenderness.   Musculoskeletal:      Cervical back: Tenderness (left  trapezius) present. No rigidity.   Skin:     General: Skin is warm and dry.   Neurological:      General: No focal deficit present.      Mental Status: She is alert and oriented to person, place, and time.      Cranial Nerves: No facial asymmetry.      Sensory: Sensory deficit (right upper extremity) present.      Motor: No weakness.      Comments: Intermittently stuttering speech, no facial drooping; moving all limbs spontaneously   Psychiatric:         Attention and Perception: She does not perceive auditory or visual hallucinations.         Mood and Affect: Mood is anxious. Affect is tearful.         Behavior: Behavior is cooperative.         Thought Content: Thought content is not paranoid. Thought content does not include suicidal plan.           Significant Labs: All pertinent labs within the past 24 hours have been reviewed.    Significant Imaging: I have reviewed all pertinent imaging results/findings within the past 24 hours.

## 2023-05-26 NOTE — HOSPITAL COURSE
Rosmery Woodard was admitted for difficulty with speech, stroke work up underway. CTA head and neck and MRI brain without acute abnormalities. Psychiatry and neurology consulted. Suspect anxiety contributing to presentation. Vascular neuro evaluated, constellation of symptoms do not fit vascular distribution, no further inpatient work up needed at this time. Stable for dc with PCP and psychiatry follow up, return precautions discussed, no further questions at dc

## 2023-05-26 NOTE — HPI
"Rosmery Woodard is a 63 year old lady with hx of GERD, ulcerative colitis, migraines, depression, anxiety presenting to the ED for complains of increasing difficulty in expressing words, posterior headache, generalized weakness, vertigo for the past 3 days. Patient reports calling PCP office and was told to come to ED. Patient reports that today she was unable to get out of bed, and was having intractable shaking in all limbs. Patient reports recent stressors in family. Patient is compliant to all prescribed medication. Patient reports "I feel like I am not going to make it home this time.".     Afebrile, HR 90, /82, Pox 99% on RA. No leukocytosis. Plt 457. K 3.2. . Na 140. CTA Head and Neck with no acute abnormality. No large vessel occlusion. Code stroke activated. No neurology note nor orders.   Patient reports expressive aphasia somewhat improved after PO alprazolam, although still evident. Reports posterior headache 8/10 severity, constant. Patient reports change in vision "I can't make out the faces on the TV" but able to see features of both myself and bedside nurse in room. Patient reports persistent dizziness, ?explanation for vision change (also wears eye glasses but reports this is different). Passed bedside stack swallow rest with thin fluids. 3/5 motor function in right upper and lower extremities, reduced sensation in right upper and lower extremities. EOM normal, no facial asymmetry.     Patient is admitted to hospital medicine for 3 day acute onset of expressive aphasia, vertigo, posterior headache and right upper and lower extremity weakness.   "

## 2023-05-26 NOTE — PT/OT/SLP PROGRESS
Occupational Therapy      Patient Name:  Rosmery Woodard   MRN:  [id number]    Patient not seen today secondary to  . Will follow-up later today as schedule permits.    5/26/2023

## 2023-05-26 NOTE — PLAN OF CARE
Attempted to schedule appt for hospital follow & to establish primary care at [facility] but was told office will have to contact CM with appt date/time - awaiting response - lives with daughter - prior to admit independent in ADLs - PT/OT/ST evals pending     [facility] - Med Surg (Beatriz)  Initial Discharge Assessment       Primary Care Provider: Primary Doctor No    Admission Diagnosis: Stroke [I63.9]  Neurological complaint [R29.90]    Admission Date: 5/25/2023  Expected Discharge Date:     Transition of Care Barriers: None    Payor: [employer]ET[employer] MANAGED MEDICARE / Plan: AET[employer] MEDICARE DUAL DSNP / Product Type: Medicare Advantage /     Extended Emergency Contact Information  Primary Emergency Contact: Rob Joe  Address: [address] [place] [address] [facility]  Mobile Phone: [phone]  Relation: Daughter    Discharge Plan A:  (TBD)         [id number] - [place], [place] - [address] AT [facility] [address]  [address] [place] [address]  Phone: [phone] Fax: [phone]      Initial Assessment (most recent)       Adult Discharge Assessment - 05/26/23 1011          Discharge Assessment    Assessment Type Discharge Planning Assessment     Confirmed/corrected address, phone number and insurance Yes     Confirmed Demographics --   corrected in EPIC    Source of Information patient     Does patient/caregiver understand observation status Yes     People in Home child(jean pierre), adult     Do you expect to return to your current living situation? Yes     Do you have help at home or someone to help you manage your care at home? Yes     Prior to hospitilization cognitive status: Alert/Oriented     Current cognitive status: Alert/Oriented     Equipment Currently Used at Home none     Do you currently have service(s) that help you manage your care at home? No     Do you take prescription medications? Yes     Do you have prescription coverage? Yes      Do you have any problems affording any of your prescribed medications? No     Is the patient taking medications as prescribed? yes     Who is going to help you get home at discharge? daughter     How do you get to doctors appointments? family or friend will provide     Are you on dialysis? No     Discharge Plan A --   TBD    DME Needed Upon Discharge  --   TBD    Discharge Plan discussed with: Patient     Transition of Care Barriers None        Physical Activity    On average, how many days per week do you engage in moderate to strenuous exercise (like a brisk walk)? 0 days     On average, how many minutes do you engage in exercise at this level? 0 min        Financial Resource Strain    How hard is it for you to pay for the very basics like food, housing, medical care, and heating? Somewhat hard        Housing Stability    In the last 12 months, was there a time when you were not able to pay the mortgage or rent on time? Yes     In the last 12 months, how many places have you lived? 1     In the last 12 months, was there a time when you did not have a steady place to sleep or slept in a shelter (including now)? No        Transportation Needs    In the past 12 months, has lack of transportation kept you from medical appointments or from getting medications? Yes     In the past 12 months, has lack of transportation kept you from meetings, work, or from getting things needed for daily living? Yes        Food Insecurity    Within the past 12 months, you worried that your food would run out before you got the money to buy more. Sometimes true     Within the past 12 months, the food you bought just didn't last and you didn't have money to get more. Sometimes true        Stress    Do you feel stress - tense, restless, nervous, or anxious, or unable to sleep at night because your mind is troubled all the time - these days? Very much        Social Connections    How often do you get together with friends or relatives?  Three times a week     How often do you attend Quaker or Sabianism services? Never     Do you belong to any clubs or organizations such as Quaker groups, unions, fraternal or athletic groups, or school groups? No     How often do you attend meetings of the clubs or organizations you belong to? Never     Are you , , , , never , or living with a partner?         Alcohol Use    Q1: How often do you have a drink containing alcohol? Monthly or less     Q2: How many drinks containing alcohol do you have on a typical day when you are drinking? 1 or 2     Q3: How often do you have six or more drinks on one occasion? Never

## 2023-05-26 NOTE — H&P
"[facility] Medicine  History & Physical    Patient Name: Rosmery Woodard  MRN: [id number]  Patient Class: OP- Observation  Admission Date: 5/25/2023  Attending Physician: Chase Lopez MD   Primary Care Provider: Primary Doctor No         Patient information was obtained from patient, past medical records and ER records.     Subjective:     Principal Problem:Stroke    Chief Complaint:   Chief Complaint   Patient presents with    Aphasia     Pt reports "trouble speaking" starting 4:00 pm yesterday; pt also reporting dizziness and visual changes; pt states she has not slept , pt answering questions appropriately but having difficulty speaking, appears restless;  in triage, code stroke called overhead        HPI: Rosmrey Woodard is a 63 year old lady with hx of GERD, ulcerative colitis, migraines, depression, anxiety presenting to the ED for complains of increasing difficulty in expressing words, posterior headache, generalized weakness, vertigo for the past 3 days. Patient reports calling PCP office and was told to come to ED. Patient reports that today she was unable to get out of bed, and was having intractable shaking in all limbs. Patient reports recent stressors in family. Patient is compliant to all prescribed medication. Patient reports "I feel like I am not going to make it home this time.".     Afebrile, HR 90, /82, Pox 99% on RA. No leukocytosis. Plt 457. K 3.2. . Na 140. CTA Head and Neck with no acute abnormality. No large vessel occlusion. Code stroke activated. No neurology note nor orders.   Patient reports expressive aphasia somewhat improved after PO alprazolam, although still evident. Reports posterior headache 8/10 severity, constant. Patient reports change in vision "I can't make out the faces on the TV" but able to see features of both myself and bedside nurse in room. Patient reports persistent dizziness, ?explanation for vision change (also wears eye " "glasses but reports this is different). Passed bedside stack swallow rest with thin fluids. 3/5 motor function in right upper and lower extremities, reduced sensation in right upper and lower extremities. EOM normal, no facial asymmetry.     Patient is admitted to hospital medicine for 3 day acute onset of expressive aphasia, vertigo, posterior headache and right upper and lower extremity weakness.       Past Medical History:   Diagnosis Date    Asthma     Hypertension     Liver abscess     hx    Portal vein thrombosis 10/2013    Portal vein thrombosis 10/2013    Ulcerative colitis 9/15/2013       Past Surgical History:   Procedure Laterality Date    ABDOMINAL SURGERY      3 exploratory surgeries    APPENDECTOMY       SECTION, LOW TRANSVERSE      3 C-sections    CHOLECYSTECTOMY      COLONOSCOPY      COLONOSCOPY N/A 2016    Procedure: COLONOSCOPY;  Surgeon: Brad King MD;  Location: [facility] ([address] [place] [address]);  Service: Endoscopy;  Laterality: N/A;    EXPLORATORY LAPAROTOMY W/ BOWEL RESECTION      HERNIA REPAIR      HYSTERECTOMY      INCISIONAL HERNIA REPAIR      UPPER GASTROINTESTINAL ENDOSCOPY      URETERAL STENT PLACEMENT Left 2022    Procedure: INSERTION, STENT, URETER;  Surgeon: Mann Geronimo MD;  Location: [facility];  Service: Urology;  Laterality: Left;       Review of patient's allergies indicates:   Allergen Reactions    Versed [midazolam] Nausea And Vomiting    Amlodipine Other (See Comments)     Patient says that she does not do well with generic amlodipine but does do well with Norvasc (amlodipine).    Bentyl [dicyclomine] Hives    Darvocet a500 [propoxyphene n-acetaminophen] Hives    Toradol [ketorolac] Hives    Zofran [ondansetron hcl (pf)] Nausea And Vomiting    Morphine Nausea And Vomiting     22 @ 0935 Pt, stated "I take Dilaudid without any reactions to it" pt also states that she "can take oxycodone for pain."        No current " facility-administered medications on file prior to encounter.     Current Outpatient Medications on File Prior to Encounter   Medication Sig    ALPRAZolam (XANAX) 1 MG tablet Take 1 mg by mouth 3 (three) times daily as needed for Anxiety.    amLODIPine (NORVASC) 10 MG tablet Take 10 mg by mouth once daily.    atorvastatin (LIPITOR) 10 MG tablet Take 10 mg by mouth once daily.    diphenhydrAMINE (BENADRYL) 25 mg capsule Take 25 mg by mouth every 6 (six) hours as needed for Itching.    diphenoxylate-atropine 2.5-0.025 mg (LOMOTIL) 2.5-0.025 mg per tablet Take 1 tablet by mouth 4 (four) times daily as needed for Diarrhea.    gabapentin (NEURONTIN) 100 MG capsule 200 mg every evening.    multivitamin (THERAGRAN) per tablet Take 1 tablet by mouth once daily.     oxyCODONE-acetaminophen (PERCOCET) 5-325 mg per tablet Take 1 tablet by mouth every 6 (six) hours as needed for Pain.    promethazine (PHENERGAN) 25 MG tablet Take 1 tablet (25 mg total) by mouth every 8 (eight) hours as needed for Nausea.    promethazine (PHENERGAN) 25 MG tablet Take 1 tablet (25 mg total) by mouth every 6 (six) hours as needed for Nausea.    zolpidem (AMBIEN) 10 mg Tab Take 10 mg by mouth every evening.    omeprazole (PRILOSEC) 20 MG capsule Take 20 mg by mouth 2 (two) times daily.     oxybutynin (DITROPAN) 5 MG Tab Take 1 tablet (5 mg total) by mouth 3 (three) times daily as needed (Bladder spasms).    tamsulosin (FLOMAX) 0.4 mg Cap Take 1 capsule (0.4 mg total) by mouth every evening. for 7 days     Family History       Problem Relation (Age of Onset)    Breast cancer Maternal Aunt, Cousin    Cirrhosis Maternal Aunt    Colon cancer Maternal Uncle    Hypertension Maternal Grandmother          Tobacco Use    Smoking status: Former     Packs/day: 0.10     Years: 0.50     Pack years: 0.05     Types: Cigarettes     Quit date: 1976     Years since quittin.7    Smokeless tobacco: Never   Substance and Sexual Activity     Alcohol use: Yes     Alcohol/week: 1.0 standard drink     Types: 1 Glasses of wine per week     Comment: rare    Drug use: No    Sexual activity: Never     Birth control/protection: Abstinence     Review of Systems   Constitutional:  Positive for activity change and fatigue. Negative for chills and fever.   HENT:  Negative for congestion and sore throat.    Eyes:  Positive for visual disturbance. Negative for pain.   Respiratory:  Negative for cough and shortness of breath.    Cardiovascular:  Negative for chest pain and leg swelling.   Gastrointestinal:  Negative for abdominal pain, constipation, nausea and vomiting.   Genitourinary:  Negative for difficulty urinating and dysuria.   Musculoskeletal:  Positive for neck pain. Negative for back pain.   Skin:  Negative for rash and wound.   Neurological:  Positive for speech difficulty, weakness, numbness and headaches. Negative for light-headedness.   Psychiatric/Behavioral:  Positive for sleep disturbance. The patient is nervous/anxious.    Objective:     Vital Signs (Most Recent):  Temp: 98.1 °F (36.7 °C) (05/26/23 0010)  Pulse: (!) 46 (05/26/23 0256)  Resp: 18 (05/26/23 0010)  BP: 126/73 (05/26/23 0010)  SpO2: 96 % (05/26/23 0010) Vital Signs (24h Range):  Temp:  [97.8 °F (36.6 °C)-98.1 °F (36.7 °C)] 98.1 °F (36.7 °C)  Pulse:  [46-90] 46  Resp:  [15-24] 18  SpO2:  [95 %-100 %] 96 %  BP: (112-193)/() 126/73     Weight: 71.2 kg (156 lb 15.5 oz)  Body mass index is 27.81 kg/m².     Physical Exam  Vitals and nursing note reviewed.   Constitutional:       General: She is not in acute distress.     Appearance: She is not ill-appearing.      Comments: Appears anxious   HENT:      Head: Normocephalic and atraumatic.      Nose: No congestion or rhinorrhea.   Eyes:      General: No scleral icterus.        Right eye: No discharge.         Left eye: No discharge.      Extraocular Movements: Extraocular movements intact.   Pulmonary:      Effort: Pulmonary effort is  normal.      Breath sounds: Normal breath sounds.   Abdominal:      Palpations: Abdomen is soft.      Tenderness: There is no abdominal tenderness.   Musculoskeletal:      Cervical back: Tenderness (left trapezius) present. No rigidity.   Neurological:      Mental Status: She is alert and oriented to person, place, and time. Mental status is at baseline.      Cranial Nerves: No facial asymmetry.      Sensory: Sensory deficit (right upper extremity) present.      Motor: Weakness (right upper extremity) present.   Psychiatric:         Attention and Perception: She does not perceive auditory or visual hallucinations.         Mood and Affect: Mood is anxious. Affect is tearful.         Behavior: Behavior is cooperative.         Thought Content: Thought content is not paranoid. Thought content does not include suicidal plan.              Significant Labs: All pertinent labs within the past 24 hours have been reviewed.  A1C: No results for input(s): HGBA1C in the last 4320 hours.  BMP:   Recent Labs   Lab 05/25/23  1552   *      K 3.2*      CO2 28   BUN 11   CREATININE 1.0   CALCIUM 10.4     CBC:   Recent Labs   Lab 05/25/23  1552   WBC 11.03   HGB 14.3   HCT 42.9   *     CMP:   Recent Labs   Lab 05/25/23  1552      K 3.2*      CO2 28   *   BUN 11   CREATININE 1.0   CALCIUM 10.4   PROT 9.0*   ALBUMIN 4.5   BILITOT 0.6   ALKPHOS 83   AST 32   ALT 36   ANIONGAP 9     Cardiac Markers: No results for input(s): CKMB, MYOGLOBIN, BNP, TROPISTAT in the last 48 hours.  Coagulation:   Recent Labs   Lab 05/25/23  1559   INR 1.4*     Lipid Panel:   Recent Labs   Lab 05/25/23  1552   CHOL 192   HDL 49   LDLCALC 103.6   TRIG 197*   CHOLHDL 25.5     Magnesium: No results for input(s): MG in the last 48 hours.  POCT Glucose:   Recent Labs   Lab 05/25/23  1547   POCTGLUCOSE 120*     Troponin: No results for input(s): TROPONINI, TROPONINIHS in the last 48 hours.  TSH:   Recent Labs   Lab  05/25/23  1552   TSH 1.398     Urine Culture: No results for input(s): LABURIN in the last 48 hours.  Urine Studies: No results for input(s): COLORU, APPEARANCEUA, PHUR, SPECGRAV, PROTEINUA, GLUCUA, KETONESU, BILIRUBINUA, OCCULTUA, NITRITE, UROBILINOGEN, LEUKOCYTESUR, RBCUA, WBCUA, BACTERIA, SQUAMEPITHEL, HYALINECASTS in the last 48 hours.    Invalid input(s): MARIETTAR    Significant Imaging: I have reviewed and interpreted all pertinent imaging results/findings within the past 24 hours.    Assessment/Plan:     * Stroke  Presents with difficulty in expressing her words for 3 days, dizziness with vertiginous symptoms, generalized weakness, posterior headache 8/10, vision changes, worsening. Reduced motor function and sensation in right upper and lower extremity. No dysphagia, no facial droop. Expressive aphasia.   CTA Head and Neck with no acute abnormality. No large vessel occlusion.  Code stroke called from ED. No neuro note, no orders.      Antithrombotics for secondary stroke prevention: Antiplatelets: Aspirin: 81 mg daily  Clopidogrel: 75 mg daily  Anticoagulants: SC enoxaparin 40mg daily    Statins for secondary stroke prevention and hyperlipidemia, if present:   Statins: Atorvastatin- 40 mg daily, increased from atorvastatin 10mg daily    Aggressive risk factor modification: HTN, HLD, situational stressors happening in family     Rehab efforts: The patient has been evaluated by a stroke team provider and the therapy needs have been fully considered based off the presenting complaints and exam findings. The following therapy evaluations are needed: PT evaluate and treat, OT evaluate and treat, SLP evaluate and treat    Diagnostics ordered/pending: CTA Head to assess vasculature , HgbA1C to assess blood glucose levels, Lipid Profile to assess cholesterol levels, MRI head without contrast to assess brain parenchyma, TTE to assess cardiac function/status , TSH to assess thyroid function    VTE prophylaxis:  "Enoxaparin 40 mg SQ every 24 hours    BP parameters: Infarct: No intervention, SBP <220  Hold home dose amlodipine 10mg daily    Pending:   - MRI Brain  - Echo  - Consult to stroke rounding  - PT, OT, SLP evaluation    Neck pain on left side  Chronic. Moderate pain to left side. On oxycodone-acetaminophen 5-325mg Q6 PRN at home    - Lidocaine 5% patch to left side of neck  - Acetaminophen 1g Q8 PRN, oxycodone 5mg Q6 PRN    Anxiety  Hx of Anxiety, depression, insomnia  Patient reports recent multiple family stressors. Developed expressive aphasia with intractable jerking in all limbs this morning with weakness. Reports being given alprazolam 1mg in ED with improvement in aphasia. Reports have not slept in 3 days. Reports being easily frightened with sudden benign movements of objects such as "curtain moving". Reports feeling like she is not going to recover with this current hospitalization, feels like "I am going to die". Denies SI/HI or hallucinations.    - Resume alprazolam 1mg TID PRN for anxiety  - Add hydroxyzine 25mg qhs for anxiety and sleep in addition to home dose zolpidem 10mg   - Patient reports celexa makes her feel like zombie, so she doesn't take. Might benefit from telepsych consult inpatient. Consult placed.    Ulcerative colitis  Chronic. Controlled. No diarrheal episodes today.     - Resume home dose lomotil QID PRN       Erosive gastritis  Chronic. Resume home dose pantoprazole 40mg daily        VTE Risk Mitigation (From admission, onward)         Ordered     enoxaparin injection 40 mg  Daily         05/25/23 2234     IP VTE LOW RISK PATIENT  Once         05/25/23 2238     Place sequential compression device  Until discontinued         05/25/23 2113                On 05/26/2023, patient should be placed in hospital observation services under my care in collaboration with Dr. Lopez.      Bib Bonilla NP  Department of Hospital Medicine  [facility] ([place])  "

## 2023-05-26 NOTE — ASSESSMENT & PLAN NOTE
Presents with difficulty in expressing her words for 3 days, dizziness with vertiginous symptoms, generalized weakness, posterior headache 8/10, vision changes, worsening. Reduced motor function and sensation in right upper and lower extremity. No dysphagia, no facial droop. Expressive aphasia.   CTA Head and Neck with no acute abnormality. No large vessel occlusion.     Antithrombotics for secondary stroke prevention: Antiplatelets: Aspirin: 81 mg daily  Clopidogrel: 75 mg daily  Anticoagulants: SC enoxaparin 40mg daily    Statins for secondary stroke prevention and hyperlipidemia, if present:   Statins: Atorvastatin- 40 mg daily, increased from atorvastatin 10mg daily    Aggressive risk factor modification: HTN, HLD, situational stressors happening in family     Rehab efforts: The patient has been evaluated by a stroke team provider and the therapy needs have been fully considered based off the presenting complaints and exam findings. The following therapy evaluations are needed: PT evaluate and treat, OT evaluate and treat, SLP evaluate and treat    Diagnostics ordered/pending: CTA Head to assess vasculature , HgbA1C to assess blood glucose levels, Lipid Profile to assess cholesterol levels, MRI head without contrast to assess brain parenchyma, TTE to assess cardiac function/status , TSH to assess thyroid function: lipid panel, a1c and TSH all reassuring    VTE prophylaxis: Enoxaparin 40 mg SQ every 24 hours    BP parameters: Infarct: No intervention, SBP <220  Hold home dose amlodipine 10mg daily    Pending:   - MRI Brain unremarkable  - Echo pending  - Consult to stroke rounding and psychiatry  - suspect anixety contributing   - PT, OT, SLP evaluation

## 2023-05-26 NOTE — ASSESSMENT & PLAN NOTE
62 y/o female with HTN, HLD, ulcerative colitis, migraines, depression, anxiety presented with multiple neurological symptoms that have been ongoing for several days.  On exam patient reports subjective numbness to the right face, arm, and leg.  Otherwise exam normal.  She did have episodes of stuttering speech and required a lot of encouragement to complete the exam.  Her behavior and responses are slowed.  MRI with no acute findings.  CTA with no high-grade stenosis or occlusion.    Constellation of symptoms do not fit a vascular distribution.  Low suspicion for cerebrovascular cause. Patient voiced being under more stress recently.  Her symptoms may be physical manifestations of these current stressors.      Recommendations  -No further inpatient stroke workup needed at this time  -Recommend continued outpatient care by psychiatry  -Please call for any questions

## 2023-05-26 NOTE — CONSULTS
[facility] ([place])  Vascular Neurology  [facility]  TeleVascular Neurology Consult Note    Inpatient Consult Tele-Vascular Neurology  Consult performed by: Evelyne Lopez NP  Consult ordered by: Bib Bonilla NP      Assessment/Plan:     Patient is a 63 y.o. year old female with:    * Multiple neurological symptoms  64 y/o female with HTN, HLD, ulcerative colitis, migraines, depression, anxiety presented with multiple neurological symptoms that have been ongoing for several days.  On exam patient reports subjective numbness to the right face, arm, and leg.  Otherwise exam normal.  She did have episodes of stuttering speech and required a lot of encouragement to complete the exam.  Her behavior and responses are slowed.  MRI with no acute findings.  CTA with no high-grade stenosis or occlusion.    Constellation of symptoms do not fit a vascular distribution.  Low suspicion for cerebrovascular cause. Patient voiced being under more stress recently.  Her symptoms may be physical manifestations of these current stressors.      Recommendations  -No further inpatient stroke workup needed at this time  -Recommend continued outpatient care by psychiatry  -Please call for any questions        Essential hypertension  -Stroke risk factor  -Can resume home medication  -Long term goal < 130/80           STROKE DOCUMENTATION     Acute Stroke Times   Symptom Onset Date: 05/24/23  Symptom Onset Time: 1600    NIH Scale:  1a. Level of Consciousness: 0-->Alert, keenly responsive  1b. LOC Questions: 0-->Answers both questions correctly  1c. LOC Commands: 0-->Performs both tasks correctly  2. Best Gaze: 0-->Normal  3. Visual: 0-->No visual loss  4. Facial Palsy: 0-->Normal symmetrical movements  5a. Motor Arm, Left: 0-->No drift, limb holds 90 (or 45) degrees for full 10 secs  5b. Motor Arm, Right: 0-->No drift, limb holds 90 (or 45) degrees for full 10 secs  6a. Motor Leg, Left: 0-->No drift, leg holds 30  "degree position for full 5 secs  6b. Motor Leg, Right: 0-->No drift, leg holds 30 degree position for full 5 secs  7. Limb Ataxia: 0-->Absent  8. Sensory: 1-->Mild-to-moderate sensory loss, patient feels pinprick is less sharp or is dull on the affected side, or there is a loss of superficial pain with pinprick, but patient is aware of being touched  9. Best Language: 0-->No aphasia, normal  10. Dysarthria: 0-->Normal  11. Extinction and Inattention (formerly Neglect): 0-->No abnormality  Total (NIH Stroke Scale): 1    Modified Lori Score: 0  [place] Coma Scale:    ABCD2 Score:    [id number] Score:   HAS -BLED Score:   ICH Score:   Hunt & Sharp Classification:       Thrombolysis Candidate? No, Strong suspicion for stroke mimic or alternative diagnosis     Delays to Thrombolysis?  Not Applicable    Interventional Revascularization Candidate?   Is the patient eligible for mechanical endovascular reperfusion (THERESA)?  No; No large vessel occlusion identified on imaging     Delays to Thrombectomy? Not Applicable    Hemorrhagic change of an Ischemic Stroke: Does this patient have an ischemic stroke with hemorrhagic changes? No     Subjective:     History of Present Illness:  64 y/o female with HTN, HLD, ulcerative colitis, migraines, depression, anxiety presented to the ED with difficulty expressing words, HA, generalized weakness and dizziness for the past 3 days.  She reported on admit to having intractable shaking in all limbs yesterday morning.  Patient also reports difficulty "understanding."  She has had vision changes, fatigue, sleep disturbances, anxiety. She has never felt like this before.  She has no history of stroke or TIA.          Past Medical History:   Diagnosis Date    Asthma     Hypertension     Liver abscess     hx    Portal vein thrombosis 10/2013    Portal vein thrombosis 10/2013    Ulcerative colitis 9/15/2013       Past Surgical History:   Procedure Laterality Date    ABDOMINAL SURGERY      3 " exploratory surgeries    APPENDECTOMY       SECTION, LOW TRANSVERSE      3 C-sections    CHOLECYSTECTOMY      COLONOSCOPY      COLONOSCOPY N/A 2016    Procedure: COLONOSCOPY;  Surgeon: Brad King MD;  Location: [address]);  Service: Endoscopy;  Laterality: N/A;    EXPLORATORY LAPAROTOMY W/ BOWEL RESECTION      HERNIA REPAIR      HYSTERECTOMY      INCISIONAL HERNIA REPAIR      UPPER GASTROINTESTINAL ENDOSCOPY      URETERAL STENT PLACEMENT Left 2022    Procedure: INSERTION, STENT, URETER;  Surgeon: Mann Geronimo MD;  Location: [facility];  Service: Urology;  Laterality: Left;       Family History   Problem Relation Age of Onset    Hypertension Maternal Grandmother     Cirrhosis Maternal Aunt     Breast cancer Maternal Aunt     Colon cancer Maternal Uncle     Breast cancer Cousin     Celiac disease Neg Hx     Colon polyps Neg Hx     Crohn's disease Neg Hx     Cystic fibrosis Neg Hx     Esophageal cancer Neg Hx     Hemochromatosis Neg Hx     Inflammatory bowel disease Neg Hx     Irritable bowel syndrome Neg Hx     Liver cancer Neg Hx     Liver disease Neg Hx     Rectal cancer Neg Hx     Stomach cancer Neg Hx     Ulcerative colitis Neg Hx     Scott's disease Neg Hx        Social History     Socioeconomic History    Marital status:    Occupational History    Occupation: disabled    Tobacco Use    Smoking status: Former     Packs/day: 0.10     Years: 0.50     Pack years: 0.05     Types: Cigarettes     Quit date: 1976     Years since quittin.7    Smokeless tobacco: Never   Substance and Sexual Activity    Alcohol use: Yes     Alcohol/week: 1.0 standard drink     Types: 1 Glasses of wine per week     Comment: rare    Drug use: No    Sexual activity: Never     Birth control/protection: Abstinence   Social History Narrative    5 daughters, disabled     Social Determinants of Health     Financial Resource Strain: Medium Risk    Difficulty of Paying Living  Expenses: Somewhat hard   Food Insecurity: Food Insecurity Present    Worried About Running Out of Food in the Last Year: Sometimes true    Ran Out of Food in the Last Year: Sometimes true   Transportation Needs: Unmet Transportation Needs    Lack of Transportation (Medical): Yes    Lack of Transportation (Non-Medical): Yes   Physical Activity: Inactive    Days of Exercise per Week: 0 days    Minutes of Exercise per Session: 0 min   Stress: Stress Concern Present    Feeling of Stress : Very much   Social Connections: Socially Isolated    Frequency of Social Gatherings with Friends and Family: Three times a week    Attends Buddhist Services: Never    Active Member of Clubs or Organizations: No    Attends Club or Organization Meetings: Never    Marital Status:    Housing Stability: High Risk    Unable to Pay for Housing in the Last Year: Yes    Number of Places Lived in the Last Year: 1    Unstable Housing in the Last Year: No       Current Facility-Administered Medications   Medication Dose Route Frequency Provider Last Rate Last Admin    acetaminophen tablet 1,000 mg  1,000 mg Oral Q8H PRN Bib Chad, NP        ALPRAZolam tablet 1 mg  1 mg Oral TID PRN Bib Chad, NP   1 mg at 05/26/23 0745    aspirin EC tablet 81 mg  81 mg Oral Daily Shuzhen Chad, NP   81 mg at 05/26/23 0405    atorvastatin tablet 40 mg  40 mg Oral Daily Shuzhen Chad, NP   40 mg at 05/25/23 2334    clopidogreL tablet 75 mg  75 mg Oral Daily Shuzhen Chad, NP   75 mg at 05/26/23 0405    diphenhydrAMINE capsule 25 mg  25 mg Oral Q6H PRN Shuzhiwot Chad, NP   25 mg at 05/26/23 0745    diphenoxylate-atropine 2.5-0.025 mg per tablet 1 tablet  1 tablet Oral QID PRN Shuzhen Chad, NP        enoxaparin injection 40 mg  40 mg Subcutaneous Daily Shuzhen Chad, NP   40 mg at 05/25/23 2334    gabapentin capsule 200 mg  200 mg Oral QHS Shuzhen Chad, NP   200 mg at 05/25/23 2334    hydrOXYzine HCL tablet 25 mg  25 mg Oral QHS Shuzhen Chad, NP   25 mg at  "05/25/23 2334    LIDOcaine 5 % patch 1 patch  1 patch Transdermal Q24H Shricki Chad, NP   1 patch at 05/26/23 0405    multivitamin tablet  1 tablet Oral Daily Shuzhen Chad, NP        oxyCODONE immediate release tablet 5 mg  5 mg Oral Q6H PRN Shuzhen Chad, NP        pantoprazole EC tablet 40 mg  40 mg Oral Daily Shuzhen Chad, NP   40 mg at 05/25/23 2334    polyethylene glycol packet 17 g  17 g Oral Daily Shuzhen Chad, NP        potassium chloride SA CR tablet 40 mEq  40 mEq Oral Once Lashawn Merchant PA-C        prochlorperazine injection Soln 5 mg  5 mg Intravenous Q6H PRN Demetriouzhen Chad, NP        sodium chloride 0.9% bolus 500 mL 500 mL  500 mL Intravenous Continuous PRN Shuzhen Chad, NP        sodium chloride 0.9% flush 10 mL  10 mL Intravenous PRN Kesha Arroyo MD        sodium chloride 0.9% flush 10 mL  10 mL Intravenous PRN Demetriouzhen Chad, NP        zolpidem tablet 10 mg  10 mg Oral Nightly PRN Shuzhen Chad, NP   10 mg at 05/26/23 0111     Home and current medications reviewed    Review of patient's allergies indicates:   Allergen Reactions    Versed [midazolam] Nausea And Vomiting    Amlodipine Other (See Comments)     Patient says that she does not do well with generic amlodipine but does do well with Norvasc (amlodipine).    Bentyl [dicyclomine] Hives    Darvocet a500 [propoxyphene n-acetaminophen] Hives    Toradol [ketorolac] Hives    Zofran [ondansetron hcl (pf)] Nausea And Vomiting    Morphine Nausea And Vomiting     12/7/22 @ 0935 Pt, stated "I take Dilaudid without any reactions to it" pt also states that she "can take oxycodone for pain."          Review of Systems   Constitutional:  Positive for fatigue. Negative for activity change and fever.   HENT:  Negative for drooling and trouble swallowing.    Eyes:  Positive for visual disturbance.   Respiratory:  Negative for cough and shortness of breath.    Cardiovascular:  Negative for chest pain and palpitations.   Gastrointestinal:  Negative for " "abdominal pain, diarrhea, nausea and vomiting.   Genitourinary:  Negative for difficulty urinating and dysuria.   Musculoskeletal:  Positive for neck pain.   Skin:  Negative for color change and pallor.   Neurological:  Positive for dizziness, speech difficulty, weakness, light-headedness, numbness and headaches.   Psychiatric/Behavioral:  Positive for decreased concentration and sleep disturbance. The patient is nervous/anxious.    Objective:     Vitals:    05/26/23 0402 05/26/23 0608 05/26/23 0650 05/26/23 1024   BP: 124/65  124/65    BP Location: Right arm      Patient Position: Lying      Pulse: 61 64 64    Resp: 18      Temp: 98.6 °F (37 °C)      TempSrc: Oral      SpO2: 98%   98%   Weight:   70.8 kg (156 lb)    Height:   5' 3" (1.6 m)          Physical Exam  Constitutional:       General: She is not in acute distress.  HENT:      Head: Normocephalic.      Right Ear: External ear normal.      Left Ear: External ear normal.      Nose: Nose normal.   Pulmonary:      Effort: Pulmonary effort is normal. No respiratory distress.   Abdominal:      General: There is no distension.      Tenderness: There is no guarding.   Musculoskeletal:      Right lower leg: No edema.      Left lower leg: No edema.   Skin:     General: Skin is dry.   Neurological:      Mental Status: She is alert.   Psychiatric:         Speech: Speech is delayed.         Behavior: Behavior is slowed.       Neurological Exam  LOC: alert - required a lot of encouragement to complete parts of the exam  Attention Span: Good   Language: No aphasia - able to describe picture; able to name items; able to read sentences; answered questions appropriately; patient did have episodes of stuttering speech not typical of aphasia  Articulation: No dysarthria  Orientation: Person, Place, Time   Visual Fields: Full  EOM (CN III, IV, VI): Full/intact  Facial Sensation (CN V): Facial sensory loss  Facial Movement (CN VII): Symmetric facial expression    Motor: Arm left "    Full antigravity; Full power noted with nurse assistance  Leg left    Full antigravity; Full power noted with nurse assistance  Arm right    Full antigravity; Full power noted with nurse assistance  Leg right   Full antigravity; Full power noted with nurse assistance  Cerebellum: No evidence of appendicular or axial ataxia  Sensation: Micah-hypoesthesia right    Diagnostic Results     Brain Imaging   5/26/2023 MRI brain reviewed and independently interpreted by me.  No diffusion restriction.  No acute hemorrhage. Remote infarct right cerebellum.  Chronic white matter disease.    Vessel Imaging   5/25/2023 CTA head and neck reviewed and independently interpreted by me.  No high-grade stenosis or occlusion.  No significant intracranial atherosclerotic disease.        VIRTUAL TELENOTE    Chief complaint: Multiple neurological symptoms  The patient location is: [place]  Present with the patient at the time of the telemed/virtual assessment: Mother     The attending portion of this evaluation, treatment, and documentation was performed per Evelyne Lopez NP via Telemedicine AudioVisual using the secure Captify software platform with 2 way audio/video. The provider was located off-site and the patient is located in the hospital. The aforementioned video software was utilized to document the relevant history and physical exam.      Evelyne Lopez NP  [facility]  Department of Vascular Neurology   [facility] ([place])

## 2023-05-27 VITALS
SYSTOLIC BLOOD PRESSURE: 108 MMHG | DIASTOLIC BLOOD PRESSURE: 67 MMHG | TEMPERATURE: 98 F | BODY MASS INDEX: 27.64 KG/M2 | RESPIRATION RATE: 16 BRPM | WEIGHT: 156 LBS | OXYGEN SATURATION: 98 % | HEART RATE: 64 BPM | HEIGHT: 63 IN

## 2023-05-27 LAB
ALBUMIN SERPL BCP-MCNC: 3.4 G/DL (ref 3.5–5.2)
ALP SERPL-CCNC: 67 U/L (ref 55–135)
ALT SERPL W/O P-5'-P-CCNC: 22 U/L (ref 10–44)
ANION GAP SERPL CALC-SCNC: 8 MMOL/L (ref 8–16)
AST SERPL-CCNC: 20 U/L (ref 10–40)
BASOPHILS # BLD AUTO: 0.07 K/UL (ref 0–0.2)
BASOPHILS NFR BLD: 0.9 % (ref 0–1.9)
BILIRUB SERPL-MCNC: 0.3 MG/DL (ref 0.1–1)
BUN SERPL-MCNC: 15 MG/DL (ref 8–23)
CALCIUM SERPL-MCNC: 8.9 MG/DL (ref 8.7–10.5)
CHLORIDE SERPL-SCNC: 105 MMOL/L (ref 95–110)
CO2 SERPL-SCNC: 25 MMOL/L (ref 23–29)
CREAT SERPL-MCNC: 1 MG/DL (ref 0.5–1.4)
DIFFERENTIAL METHOD: ABNORMAL
EOSINOPHIL # BLD AUTO: 0.4 K/UL (ref 0–0.5)
EOSINOPHIL NFR BLD: 5.3 % (ref 0–8)
ERYTHROCYTE [DISTWIDTH] IN BLOOD BY AUTOMATED COUNT: 14.2 % (ref 11.5–14.5)
EST. GFR  (NO RACE VARIABLE): >60 ML/MIN/1.73 M^2
GLUCOSE SERPL-MCNC: 110 MG/DL (ref 70–110)
HCT VFR BLD AUTO: 33.5 % (ref 37–48.5)
HGB BLD-MCNC: 11.3 G/DL (ref 12–16)
IMM GRANULOCYTES # BLD AUTO: 0.07 K/UL (ref 0–0.04)
IMM GRANULOCYTES NFR BLD AUTO: 0.9 % (ref 0–0.5)
LYMPHOCYTES # BLD AUTO: 4 K/UL (ref 1–4.8)
LYMPHOCYTES NFR BLD: 49.4 % (ref 18–48)
MCH RBC QN AUTO: 28.4 PG (ref 27–31)
MCHC RBC AUTO-ENTMCNC: 33.7 G/DL (ref 32–36)
MCV RBC AUTO: 84 FL (ref 82–98)
MONOCYTES # BLD AUTO: 0.6 K/UL (ref 0.3–1)
MONOCYTES NFR BLD: 7.5 % (ref 4–15)
NEUTROPHILS # BLD AUTO: 3 K/UL (ref 1.8–7.7)
NEUTROPHILS NFR BLD: 36 % (ref 38–73)
NRBC BLD-RTO: 0 /100 WBC
PLATELET # BLD AUTO: 351 K/UL (ref 150–450)
PMV BLD AUTO: 10.7 FL (ref 9.2–12.9)
POTASSIUM SERPL-SCNC: 3.4 MMOL/L (ref 3.5–5.1)
PROT SERPL-MCNC: 6.8 G/DL (ref 6–8.4)
RBC # BLD AUTO: 3.98 M/UL (ref 4–5.4)
SODIUM SERPL-SCNC: 138 MMOL/L (ref 136–145)
WBC # BLD AUTO: 8.17 K/UL (ref 3.9–12.7)

## 2023-05-27 PROCEDURE — G0378 HOSPITAL OBSERVATION PER HR: HCPCS

## 2023-05-27 PROCEDURE — 97535 SELF CARE MNGMENT TRAINING: CPT

## 2023-05-27 PROCEDURE — 80053 COMPREHEN METABOLIC PANEL: CPT

## 2023-05-27 PROCEDURE — 99239 HOSP IP/OBS DSCHRG MGMT >30: CPT | Mod: ,,, | Performed by: PHYSICIAN ASSISTANT

## 2023-05-27 PROCEDURE — 25000003 PHARM REV CODE 250

## 2023-05-27 PROCEDURE — 99239 PR HOSPITAL DISCHARGE DAY,>30 MIN: ICD-10-PCS | Mod: ,,, | Performed by: PHYSICIAN ASSISTANT

## 2023-05-27 PROCEDURE — 94761 N-INVAS EAR/PLS OXIMETRY MLT: CPT

## 2023-05-27 PROCEDURE — 97166 OT EVAL MOD COMPLEX 45 MIN: CPT

## 2023-05-27 PROCEDURE — 36415 COLL VENOUS BLD VENIPUNCTURE: CPT

## 2023-05-27 PROCEDURE — 25000003 PHARM REV CODE 250: Performed by: PHYSICIAN ASSISTANT

## 2023-05-27 PROCEDURE — 85025 COMPLETE CBC W/AUTO DIFF WBC: CPT

## 2023-05-27 RX ADMIN — ASPIRIN 81 MG: 81 TABLET, COATED ORAL at 08:05

## 2023-05-27 RX ADMIN — CLOPIDOGREL BISULFATE 75 MG: 75 TABLET ORAL at 08:05

## 2023-05-27 RX ADMIN — POLYETHYLENE GLYCOL 3350 17 G: 17 POWDER, FOR SOLUTION ORAL at 08:05

## 2023-05-27 RX ADMIN — THERA TABS 1 TABLET: TAB at 08:05

## 2023-05-27 RX ADMIN — ALPRAZOLAM 1 MG: 0.5 TABLET ORAL at 09:05

## 2023-05-27 RX ADMIN — OXYCODONE HYDROCHLORIDE 5 MG: 5 TABLET ORAL at 09:05

## 2023-05-27 RX ADMIN — LIDOCAINE 1 PATCH: 50 PATCH CUTANEOUS at 05:05

## 2023-05-27 RX ADMIN — ATORVASTATIN CALCIUM 40 MG: 20 TABLET, FILM COATED ORAL at 08:05

## 2023-05-27 RX ADMIN — PANTOPRAZOLE SODIUM 40 MG: 40 TABLET, DELAYED RELEASE ORAL at 08:05

## 2023-05-27 NOTE — DISCHARGE SUMMARY
"[facility] ([facility]  Discharge Summary      Patient Name: Rosmery Woodard  MRN: [id number]  YISSEL: [id number]  Patient Class: OP- Observation  Admission Date: 5/25/2023  Hospital Length of Stay: 0 days  Discharge Date and Time: 5/27/2023 11:30 AM  Attending Physician: No att. providers found   Discharging Provider: Lashawn Merchant PA-C  Primary Care Provider: Primary Doctor Yuridia    Primary Care Team: Networked reference to record PCT     HPI:   Rosmery Woodard is a 63 year old lady with hx of GERD, ulcerative colitis, migraines, depression, anxiety presenting to the ED for complains of increasing difficulty in expressing words, posterior headache, generalized weakness, vertigo for the past 3 days. Patient reports calling PCP office and was told to come to ED. Patient reports that today she was unable to get out of bed, and was having intractable shaking in all limbs. Patient reports recent stressors in family. Patient is compliant to all prescribed medication. Patient reports "I feel like I am not going to make it home this time.".     Afebrile, HR 90, /82, Pox 99% on RA. No leukocytosis. Plt 457. K 3.2. . Na 140. CTA Head and Neck with no acute abnormality. No large vessel occlusion. Code stroke activated. No neurology note nor orders.   Patient reports expressive aphasia somewhat improved after PO alprazolam, although still evident. Reports posterior headache 8/10 severity, constant. Patient reports change in vision "I can't make out the faces on the TV" but able to see features of both myself and bedside nurse in room. Patient reports persistent dizziness, ?explanation for vision change (also wears eye glasses but reports this is different). Passed bedside stack swallow rest with thin fluids. 3/5 motor function in right upper and lower extremities, reduced sensation in right upper and lower extremities. EOM normal, no facial asymmetry.     Patient is admitted to hospital medicine " for 3 day acute onset of expressive aphasia, vertigo, posterior headache and right upper and lower extremity weakness.       * No surgery found *      Hospital Course:   Rosmery Woodard was admitted for difficulty with speech, stroke work up underway. CTA head and neck and MRI brain without acute abnormalities. Psychiatry and neurology consulted. Suspect anxiety contributing to presentation. Vascular neuro evaluated, constellation of symptoms do not fit vascular distribution, no further inpatient work up needed at this time. Stable for dc with PCP and psychiatry follow up, return precautions discussed, no further questions at dc       Goals of Care Treatment Preferences:  Code Status: Full Code      Consults:   Consults (From admission, onward)        Status Ordering Provider     Inpatient consult to Telemedicine - Psych  Once        Provider:  Laly Bloom MD    Completed TASNEEM DHILLON     Inpatient consult to Registered Dietitian/Nutritionist  Once        Provider:  (Not yet assigned)    Completed TASNEEM DHILLON     IP consult to case management/social work  Once        Provider:  (Not yet assigned)    Completed TASNEEM DHILLON     Inpatient Consult Tele-Vascular Neurology  Once        Provider:  (Not yet assigned)    Completed TASNEEM DHILLON     Consult to Telemedicine - Acute Stroke  Once        Provider:  (Not yet assigned)    Acknowledged IRENE MAHER          Neuro  * Multiple neurological symptoms  Presents with difficulty in expressing her words for 3 days, dizziness with vertiginous symptoms, generalized weakness, posterior headache 8/10, vision changes, worsening. Reduced motor function and sensation in right upper and lower extremity. No dysphagia, no facial droop. Expressive aphasia.   CTA Head and Neck with no acute abnormality. No large vessel occlusion.     Antithrombotics for secondary stroke prevention: Antiplatelets: Aspirin: 81 mg daily  Clopidogrel: 75 mg daily  Anticoagulants: SC enoxaparin  40mg daily    Statins for secondary stroke prevention and hyperlipidemia, if present:   Statins: Atorvastatin- 40 mg daily, increased from atorvastatin 10mg daily    Aggressive risk factor modification: HTN, HLD, situational stressors happening in family     Rehab efforts: The patient has been evaluated by a stroke team provider and the therapy needs have been fully considered based off the presenting complaints and exam findings. The following therapy evaluations are needed: PT evaluate and treat, OT evaluate and treat, SLP evaluate and treat    Diagnostics ordered/pending: CTA Head to assess vasculature , HgbA1C to assess blood glucose levels, Lipid Profile to assess cholesterol levels, MRI head without contrast to assess brain parenchyma, TTE to assess cardiac function/status , TSH to assess thyroid function: lipid panel, a1c and TSH all reassuring    VTE prophylaxis: Enoxaparin 40 mg SQ every 24 hours    BP parameters: Infarct: No intervention, SBP <220  Hold home dose amlodipine 10mg daily>> resume    Pending:   - MRI Brain unremarkable  - Echo unremarkable  - Consult to stroke rounding and psychiatry: no further inpatient stroke work up needed, suspect symptoms related to underlying stress and anxiety  - suspect anixety contributing   - PT, OT, SLP evaluation- home no needs      Final Active Diagnoses:    Diagnosis Date Noted POA    PRINCIPAL PROBLEM:  Multiple neurological symptoms [R29.90] 05/25/2023 Yes    Neck pain on left side [M54.2] 05/26/2023 Yes     Chronic    Anxiety [F41.9] 02/25/2016 Yes     Chronic    Primary insomnia [F51.01] 02/25/2016 Yes     Chronic    Ulcerative colitis [K51.90] 02/25/2016 Yes    Essential hypertension [I10]  Yes     Chronic    Erosive gastritis [K29.60] 08/05/2014 Yes      Problems Resolved During this Admission:       Discharged Condition: stable    Disposition: Home or Self Care    Follow Up:    Patient Instructions:      COMMODE FOR HOME USE     Order Specific  "Question Answer Comments   Type: Standard    Height: 5' 3" (1.6 m)    Weight: 70.8 kg (156 lb)    Does patient have medical equipment at home? none    Length of need (1-99 months): 99      Ambulatory referral/consult to Physical/Occupational Therapy   Standing Status: Future   Referral Priority: Routine Referral Type: Physical Medicine   Referral Reason: Specialty Services Required   Number of Visits Requested: 1     Ambulatory referral/consult to Speech Therapy   Standing Status: Future   Referral Priority: Routine Referral Type: Speech Therapy   Referral Reason: Specialty Services Required   Requested Specialty: Speech Pathology   Number of Visits Requested: 1     Notify your health care provider if you experience any of the following:  temperature >100.4     Notify your health care provider if you experience any of the following:  redness, tenderness, or signs of infection (pain, swelling, redness, odor or green/yellow discharge around incision site)     Notify your health care provider if you experience any of the following:  difficulty breathing or increased cough     Notify your health care provider if you experience any of the following:  severe persistent headache     Notify your health care provider if you experience any of the following:  worsening rash     Notify your health care provider if you experience any of the following:  increased confusion or weakness     Notify your health care provider if you experience any of the following:  persistent dizziness, light-headedness, or visual disturbances     Activity as tolerated       Significant Diagnostic Studies: Radiology: MRI: brain: unremarkable     Pending Diagnostic Studies:     None         Medications:  Reconciled Home Medications:      Medication List      CONTINUE taking these medications    ALPRAZolam 1 MG tablet  Commonly known as: XANAX  Take 1 mg by mouth 3 (three) times daily as needed for Anxiety.     amLODIPine 10 MG tablet  Commonly known " as: NORVASC  Take 10 mg by mouth once daily.     atorvastatin 10 MG tablet  Commonly known as: LIPITOR  Take 10 mg by mouth once daily.     diphenhydrAMINE 25 mg capsule  Commonly known as: BENADRYL  Take 25 mg by mouth every 6 (six) hours as needed for Itching.     diphenoxylate-atropine 2.5-0.025 mg 2.5-0.025 mg per tablet  Commonly known as: LOMOTIL  Take 1 tablet by mouth 4 (four) times daily as needed for Diarrhea.     gabapentin 100 MG capsule  Commonly known as: NEURONTIN  200 mg every evening.     multivitamin per tablet  Commonly known as: THERAGRAN  Take 1 tablet by mouth once daily.     omeprazole 20 MG capsule  Commonly known as: PRILOSEC  Take 20 mg by mouth 2 (two) times daily.     oxybutynin 5 MG Tab  Commonly known as: DITROPAN  Take 1 tablet (5 mg total) by mouth 3 (three) times daily as needed (Bladder spasms).     oxyCODONE-acetaminophen 5-325 mg per tablet  Commonly known as: PERCOCET  Take 1 tablet by mouth every 6 (six) hours as needed for Pain.     * promethazine 25 MG tablet  Commonly known as: PHENERGAN  Take 1 tablet (25 mg total) by mouth every 8 (eight) hours as needed for Nausea.     * promethazine 25 MG tablet  Commonly known as: PHENERGAN  Take 1 tablet (25 mg total) by mouth every 6 (six) hours as needed for Nausea.     tamsulosin 0.4 mg Cap  Commonly known as: FLOMAX  Take 1 capsule (0.4 mg total) by mouth every evening. for 7 days     zolpidem 10 mg Tab  Commonly known as: AMBIEN  Take 10 mg by mouth every evening.         * This list has 2 medication(s) that are the same as other medications prescribed for you. Read the directions carefully, and ask your doctor or other care provider to review them with you.                Indwelling Lines/Drains at time of discharge:   Lines/Drains/Airways     None                 Time spent on the discharge of patient: >35 minutes         Lashawn Merchant PA-C  Department of Hospital Medicine  [facility] ([place])

## 2023-05-27 NOTE — PLAN OF CARE
POC reviewed with patient. AAOx4. Purposeful rounding completed. VS stable on room air. Pt was able to have full conversations with RN during shift. Complaints of pain treated with PRN pain medication according to MAR. No other complaints verbalized during shift. No injuries, falls, or trauma occurred during shift. Bed low and locked with side rails up x 3 and call light within reach.        Problem: Adult Inpatient Plan of Care  Goal: Plan of Care Review  Outcome: Ongoing, Progressing  Goal: Patient-Specific Goal (Individualized)  Outcome: Ongoing, Progressing  Goal: Absence of Hospital-Acquired Illness or Injury  Outcome: Ongoing, Progressing  Goal: Optimal Comfort and Wellbeing  Outcome: Ongoing, Progressing  Goal: Readiness for Transition of Care  Outcome: Ongoing, Progressing     Problem: Communication Impairment (Stroke, Ischemic/Transient Ischemic Attack)  Goal: Improved Communication Skills  Outcome: Ongoing, Progressing

## 2023-05-27 NOTE — PROGRESS NOTES
Physical Therapy    947: Pt not seen by PT today secondary to nurse said she is discharged home and not to bother her.       Olivia Douglas, PT, DPT  5/27/2023

## 2023-05-27 NOTE — PLAN OF CARE
Bhakti Liang reports patient doesn't qualify for bedside commode as she's not confined to one room or one level of her home that doesn't contain bathroom

## 2023-05-27 NOTE — ASSESSMENT & PLAN NOTE
Presents with difficulty in expressing her words for 3 days, dizziness with vertiginous symptoms, generalized weakness, posterior headache 8/10, vision changes, worsening. Reduced motor function and sensation in right upper and lower extremity. No dysphagia, no facial droop. Expressive aphasia.   CTA Head and Neck with no acute abnormality. No large vessel occlusion.     Antithrombotics for secondary stroke prevention: Antiplatelets: Aspirin: 81 mg daily  Clopidogrel: 75 mg daily  Anticoagulants: SC enoxaparin 40mg daily    Statins for secondary stroke prevention and hyperlipidemia, if present:   Statins: Atorvastatin- 40 mg daily, increased from atorvastatin 10mg daily    Aggressive risk factor modification: HTN, HLD, situational stressors happening in family     Rehab efforts: The patient has been evaluated by a stroke team provider and the therapy needs have been fully considered based off the presenting complaints and exam findings. The following therapy evaluations are needed: PT evaluate and treat, OT evaluate and treat, SLP evaluate and treat    Diagnostics ordered/pending: CTA Head to assess vasculature , HgbA1C to assess blood glucose levels, Lipid Profile to assess cholesterol levels, MRI head without contrast to assess brain parenchyma, TTE to assess cardiac function/status , TSH to assess thyroid function: lipid panel, a1c and TSH all reassuring    VTE prophylaxis: Enoxaparin 40 mg SQ every 24 hours    BP parameters: Infarct: No intervention, SBP <220  Hold home dose amlodipine 10mg daily>> resume    Pending:   - MRI Brain unremarkable  - Echo unremarkable  - Consult to stroke rounding and psychiatry: no further inpatient stroke work up needed, suspect symptoms related to underlying stress and anxiety  - suspect anixety contributing   - PT, OT, SLP evaluation- home no needs

## 2023-05-27 NOTE — PLAN OF CARE
OT recommended bedside commode & patient agrees with recommendation but requesting it be delivered to home next week as daughter is here to provide transportation home - [facility] reports they are not in network with insurance - order faxed to [employer] message for on call

## 2023-05-27 NOTE — NURSING
Lab tech was unable to draw lab at this time, states she will send someone else to come draw lab later.

## 2023-05-27 NOTE — PLAN OF CARE
Problem: Occupational Therapy  Goal: Occupational Therapy Goal  Description: Goals to be met by: 6/10/2023     Patient will increase functional independence with ADLs by performing:    UE Dressing with Modified [place] including clothing retrieval.  LE Dressing with Modified [place] including clothing retrieval.  Grooming while standing at sink with Modified [place].  Toileting from bedside commode with Modified [place] for hygiene and clothing management.   Toilet transfer to bedside commode with Modified [place].    Outcome: Ongoing, Progressing     Initial OT eval/treat complete.  Pt. Furniture cruising while ambulating bed<>bathroom with c/o mild dizziness.  Also using grab bar to lift/lower from standard toilet reporting that at home she typically uses bathroom counter for balance.  Able to don/doff socks via cross leg tech and don/doff sweater while seated.  To defer AD needs to PT though anticipate need for cane or RW.  Needs BSC.  Recommend post acute home health OT/PT with transition to OP therapy services.  Pt. Does not work and lives with daughter that will be able to assist as needed; Pt. Reports that she stopped driving a month ago and has been seeing floaters for last 3-months.  To benefit from continued acute care OT services to increase independence in self-care/functional transfers.  OT to follow.

## 2023-05-27 NOTE — PLAN OF CARE
Patient educated on discharge instructions and verbalized understanding.  All questions answered to patient's satisfaction.  IV removed without complication.  Patient to be transported off unit via wheelchair.     Problem: Adult Inpatient Plan of Care  Goal: Plan of Care Review  Outcome: Met  Goal: Patient-Specific Goal (Individualized)  Outcome: Met  Goal: Absence of Hospital-Acquired Illness or Injury  Outcome: Met  Goal: Optimal Comfort and Wellbeing  Outcome: Met  Goal: Readiness for Transition of Care  Outcome: Met     Problem: Adjustment to Illness (Stroke, Ischemic/Transient Ischemic Attack)  Goal: Optimal Coping  Outcome: Met     Problem: Bowel Elimination Impaired (Stroke, Ischemic/Transient Ischemic Attack)  Goal: Effective Bowel Elimination  Outcome: Met

## 2023-05-27 NOTE — PT/OT/SLP EVAL
Occupational Therapy   Evaluation and Treatment    Name: Rosmery Woodard  MRN: [id number]  Admitting Diagnosis: Multiple neurological symptoms  Recent Surgery: * No surgery found *      Recommendations:     Discharge Recommendations: home health PT, home health OT (with transition to OP therapy services)  Discharge Equipment Recommendations:  bedside commode (To defer AD needs to PT though anticipate need for cane or RW.)  Barriers to discharge:   (current functional level)    Assessment:   Initial OT eval/treat complete.  Pt. Furniture cruising while ambulating bed<>bathroom with c/o mild dizziness.  Also using grab bar to lift/lower from standard toilet with SBA reporting that at home she typically uses bathroom counter for balance.  Able to don/doff socks via cross leg tech and don/doff sweater while seated.  To defer AD needs to PT though anticipate need for cane or RW.  Needs BSC.  Recommend post acute home health OT/PT with transition to OP therapy services.  Pt. Does not work and lives with daughter that will be able to assist as needed; Pt. Reports that she stopped driving a month ago and has been seeing floaters for last 3-months.  To benefit from continued acute care OT services to increase independence in self-care/functional transfers.  OT to follow.     Rosmery Woodard is a 63 y.o. female with a medical diagnosis of Multiple neurological symptoms.  She presents with below deficits decreasing independence in self-care/functional transfers. Performance deficits affecting function: weakness, impaired endurance, impaired self care skills, impaired functional mobility, gait instability, impaired balance, decreased coordination, decreased upper extremity function, decreased lower extremity function, decreased safety awareness, pain.      Rehab Prognosis: Good; patient would benefit from acute skilled OT services to address these deficits and reach maximum level of function.       Plan:     Patient to be seen 4  x/week to address the above listed problems via self-care/home management, therapeutic activities, therapeutic exercises  Plan of Care Expires: 06/10/23  Plan of Care Reviewed with: patient    Subjective     Chief Complaint: With c/o numbness/cramping RUE-hand.  Patient/Family Comments/goals: No goals stated at this time.      Occupational Profile:  Lives with 23 y.o. daughter in [facility] with 5-6 CAMPOS; bathroom setup as standard toilet and walk-in shower.  No DME used PTA though reports holding onto furniture during household level ambulation and not feeling safe to drive for last month.  Previously MOD I with ADL tasks.    Equipment Used at Home: none  Assistance upon Discharge: Daughter able to assist.     Pain/Comfort:  Pain Rating 1: 8/10 (described as numbness with cramping pain)  Location - Side 1: Right  Location 1: hand  Pain Addressed 1: Distraction, Cessation of Activity, Nurse notified  Pain Rating Post-Intervention 1: 8/10    Patients cultural, spiritual, Yarsanism conflicts given the current situation:  (None stated.)    Objective:     Communicated with: Danyell Day RN prior to session.  Patient found HOB elevated with   upon OT entry to room.    General Precautions: Standard, fall  Orthopedic Precautions: N/A  Braces: N/A  Respiratory Status: Room air    Occupational Performance:    Bed Mobility:    Supine<>sit MOD I  Increased time  HOB elevated    Functional Mobility/Transfers:  Sit>stand from EOB  Ambulating bed<>bathroom with SBA and furniture cruising noted  Step transfer to standard toilet with SBA  Use of grab bars for lift/lower    Activities of Daily Living:  Donned/doffed socks supervision  Cross leg tech seated  Donned/doffed sweater supervision  Seated EOB    Cognitive/Visual Perceptual:  Cognitive/Psychosocial Skills:  -       Oriented to: Person, Place, Time, and Situation   -       Follows Commands/attention:Follows one-step commands  -       Communication: requires increased processing  time and increased time for answering questions; occasional stutter noted  -       Memory: No Deficits noted  -       Safety awareness/insight to disability: impaired   -       Mood/Affect/Coping skills/emotional control: Cooperative  Visual/Perceptual:  -reports blurry vision; though also wears glasses    Physical Exam:  Sensation: -       Intact  light/touch though reports numbness and cramping to RUE-hand  Upper Extremity Range of Motion:  -       Right Upper Extremity: WFL  -       Left Upper Extremity: WFL  Upper Extremity Strength: -       Right Upper Extremity: 4-/5 gross; involuntary jerking noted with added resistance  -       Left Upper Extremity: 4-/5 gross; involuntary jerking noted with added resistance   Strength: -       Right Upper Extremity: fair plus  -       Left Upper Extremity: fair plus  Fine Motor Coordination: -       Intact  Left hand thumb/finger opposition skills and Right hand thumb/finger opposition skills; DDK skills noted to be equally slowed bilaterally while demos forearm pronation/supination; finger to thumb equally slowed bilaterally though precise with reaching target    AMPA 6 Click ADL:  AMPAC Total Score: 21    Treatment & Education:  Educated on role of OT, POC, DME needs, and d/c recommendations  Supine<>sit MOD I  Increased time  HOB elevated    Functional Mobility/Transfers:  Sit>stand from EOB  Ambulating bed<>bathroom with SBA and furniture cruising noted  Step transfer to standard toilet with SBA  Use of grab bars for lift/lower    Activities of Daily Living:  Donned/doffed socks supervision  Cross leg tech seated  Donned/doffed sweater supervision  Seated EOB    Patient left HOB elevated with all lines intact, call button in reach, and nursing notified; educated on importance of calling for assist and received call light review.      GOALS:   Multidisciplinary Problems       Occupational Therapy Goals          Problem: Occupational Therapy    Goal Priority  Disciplines Outcome Interventions   Occupational Therapy Goal     OT, PT/OT Ongoing, Progressing    Description: Goals to be met by: 6/10/2023     Patient will increase functional independence with ADLs by performing:    UE Dressing with Modified [place] including clothing retrieval.  LE Dressing with Modified [place] including clothing retrieval.  Grooming while standing at sink with Modified [place].  Toileting from bedside commode with Modified [place] for hygiene and clothing management.   Toilet transfer to bedside commode with Modified [place].                         History:     Past Medical History:   Diagnosis Date    Asthma     Hypertension     Liver abscess     hx    Portal vein thrombosis 10/2013    Portal vein thrombosis 10/2013    Ulcerative colitis 9/15/2013         Past Surgical History:   Procedure Laterality Date    ABDOMINAL SURGERY      3 exploratory surgeries    APPENDECTOMY       SECTION, LOW TRANSVERSE      3 C-sections    CHOLECYSTECTOMY      COLONOSCOPY      COLONOSCOPY N/A 2016    Procedure: COLONOSCOPY;  Surgeon: Brad King MD;  Location: [address]);  Service: Endoscopy;  Laterality: N/A;    EXPLORATORY LAPAROTOMY W/ BOWEL RESECTION      HERNIA REPAIR      HYSTERECTOMY      INCISIONAL HERNIA REPAIR      UPPER GASTROINTESTINAL ENDOSCOPY      URETERAL STENT PLACEMENT Left 2022    Procedure: INSERTION, STENT, URETER;  Surgeon: Mann Geronimo MD;  Location: [facility];  Service: Urology;  Laterality: Left;       Time Tracking:     OT Date of Treatment: 23  OT Start Time: 1021  OT Stop Time: 1044  OT Total Time (min): 23 min    Billable Minutes:Evaluation 13  Self Care/Home Management 10    2023

## 2023-05-27 NOTE — PLAN OF CARE
Met with patient at bedside to discuss discharge - follow up appt scheduled with Dr Jones Don 6/9 - appt info added to AVS - discuss therapy's recommendations - denied the need for any DME but would like to pursue outpatient therapy - ambulatory referrals placed to PT/OT/ST - daughter will provide transportation home     [facility] - Med Surg (Beatriz)  Discharge Final Note    Primary Care Provider: Primary Doctor No    Expected Discharge Date: 5/27/2023    Final Discharge Note (most recent)       Final Note - 05/27/23 0927          Final Note    Assessment Type Final Discharge Note     Anticipated Discharge Disposition Home or Self Care     What phone number can be called within the next 1-3 days to see how you are doing after discharge? [phone]     Hospital Resources/Appts/Education Provided Provided patient/caregiver with written discharge plan information;Appointments scheduled and added to AVS;Appointments scheduled by Navigator/Coordinator        Post-Acute Status    Discharge Delays None known at this time

## 2023-05-27 NOTE — PLAN OF CARE
Problem: Adult Inpatient Plan of Care  Goal: Plan of Care Review  Outcome: Ongoing, Progressing  Goal: Patient-Specific Goal (Individualized)  Outcome: Ongoing, Progressing  Goal: Absence of Hospital-Acquired Illness or Injury  Outcome: Ongoing, Progressing  Goal: Optimal Comfort and Wellbeing  Outcome: Ongoing, Progressing  Goal: Readiness for Transition of Care  Outcome: Ongoing, Progressing     Problem: Communication Impairment (Stroke, Ischemic/Transient Ischemic Attack)  Goal: Improved Communication Skills  Outcome: Ongoing, Progressing     Problem: Functional Ability Impaired (Stroke, Ischemic/Transient Ischemic Attack)  Goal: Optimal Functional Ability  Outcome: Ongoing, Progressing     Problem: Sensorimotor Impairment (Stroke, Ischemic/Transient Ischemic Attack)  Goal: Improved Sensorimotor Function  Outcome: Ongoing, Progressing     Pt alert and oriented. Oxygen maintained @ room air. Afebrile. Ambulatory with standby assist. Able to express needs. Tele psych and tele neuro consults completed. Telemetry monitoring continued. MRI of brain done. Tolerating diet. Seen by PT/ OT. C/O headache relieved by Tylenol. Safety maintained.

## 2024-07-16 ENCOUNTER — HOSPITAL ENCOUNTER (EMERGENCY)
Facility: OTHER | Age: 64
Discharge: HOME | End: 2024-07-16
Attending: EMERGENCY MEDICINE
Payer: MEDICARE

## 2024-07-16 VITALS
TEMPERATURE: 98 F | BODY MASS INDEX: 28.35 KG/M2 | RESPIRATION RATE: 21 BRPM | SYSTOLIC BLOOD PRESSURE: 131 MMHG | WEIGHT: 160 LBS | HEIGHT: 63 IN | HEART RATE: 65 BPM | OXYGEN SATURATION: 96 % | DIASTOLIC BLOOD PRESSURE: 73 MMHG

## 2024-07-16 DIAGNOSIS — R42 DIZZINESS: ICD-10-CM

## 2024-07-16 DIAGNOSIS — R53.1 WEAKNESS: Primary | ICD-10-CM

## 2024-07-16 DIAGNOSIS — R07.9 CHEST PAIN: ICD-10-CM

## 2024-07-16 LAB
ALBUMIN SERPL BCP-MCNC: 3.7 G/DL (ref 3.5–5.2)
ALP SERPL-CCNC: 65 U/L (ref 55–135)
ALT SERPL W/O P-5'-P-CCNC: 19 U/L (ref 10–44)
AMPHET+METHAMPHET UR QL: NEGATIVE
ANION GAP SERPL CALC-SCNC: 12 MMOL/L (ref 8–16)
ANISOCYTOSIS BLD QL SMEAR: SLIGHT
AST SERPL-CCNC: 25 U/L (ref 10–40)
BACTERIA #/AREA URNS HPF: NORMAL /HPF
BARBITURATES UR QL SCN>200 NG/ML: NEGATIVE
BASOPHILS # BLD AUTO: 0.06 K/UL (ref 0–0.2)
BASOPHILS NFR BLD: 0.8 % (ref 0–1.9)
BENZODIAZ UR QL SCN>200 NG/ML: ABNORMAL
BILIRUB SERPL-MCNC: 0.3 MG/DL (ref 0.1–1)
BILIRUB UR QL STRIP: NEGATIVE
BNP SERPL-MCNC: <10 PG/ML (ref 0–99)
BUN SERPL-MCNC: 16 MG/DL (ref 8–23)
BZE UR QL SCN: NEGATIVE
CALCIUM SERPL-MCNC: 9.1 MG/DL (ref 8.7–10.5)
CANNABINOIDS UR QL SCN: NEGATIVE
CHLORIDE SERPL-SCNC: 105 MMOL/L (ref 95–110)
CK SERPL-CCNC: 65 U/L (ref 20–180)
CLARITY UR: CLEAR
CO2 SERPL-SCNC: 21 MMOL/L (ref 23–29)
COLOR UR: YELLOW
CREAT SERPL-MCNC: 1.1 MG/DL (ref 0.5–1.4)
CREAT UR-MCNC: 344.8 MG/DL (ref 15–325)
DIFFERENTIAL METHOD BLD: ABNORMAL
EOSINOPHIL # BLD AUTO: 0.2 K/UL (ref 0–0.5)
EOSINOPHIL NFR BLD: 2.9 % (ref 0–8)
ERYTHROCYTE [DISTWIDTH] IN BLOOD BY AUTOMATED COUNT: 14.9 % (ref 11.5–14.5)
EST. GFR  (NO RACE VARIABLE): 56 ML/MIN/1.73 M^2
GLUCOSE SERPL-MCNC: 93 MG/DL (ref 70–110)
GLUCOSE UR QL STRIP: NEGATIVE
HCT VFR BLD AUTO: 40 % (ref 37–48.5)
HCV AB SERPL QL IA: NEGATIVE
HGB BLD-MCNC: 13.2 G/DL (ref 12–16)
HGB UR QL STRIP: ABNORMAL
HIV 1+2 AB+HIV1 P24 AG SERPL QL IA: NEGATIVE
IMM GRANULOCYTES # BLD AUTO: 0.01 K/UL (ref 0–0.04)
IMM GRANULOCYTES NFR BLD AUTO: 0.1 % (ref 0–0.5)
KETONES UR QL STRIP: NEGATIVE
LEUKOCYTE ESTERASE UR QL STRIP: ABNORMAL
LYMPHOCYTES # BLD AUTO: 3.9 K/UL (ref 1–4.8)
LYMPHOCYTES NFR BLD: 53.8 % (ref 18–48)
MCH RBC QN AUTO: 28.2 PG (ref 27–31)
MCHC RBC AUTO-ENTMCNC: 33 G/DL (ref 32–36)
MCV RBC AUTO: 86 FL (ref 82–98)
METHADONE UR QL SCN>300 NG/ML: NEGATIVE
MICROSCOPIC COMMENT: NORMAL
MONOCYTES # BLD AUTO: 0.5 K/UL (ref 0.3–1)
MONOCYTES NFR BLD: 6.3 % (ref 4–15)
NEUTROPHILS # BLD AUTO: 2.6 K/UL (ref 1.8–7.7)
NEUTROPHILS NFR BLD: 36.1 % (ref 38–73)
NITRITE UR QL STRIP: NEGATIVE
NRBC BLD-RTO: 0 /100 WBC
OHS QRS DURATION: 92 MS
OHS QTC CALCULATION: 469 MS
OPIATES UR QL SCN: NEGATIVE
PCP UR QL SCN>25 NG/ML: NEGATIVE
PH UR STRIP: 6 [PH] (ref 5–8)
PLATELET # BLD AUTO: 338 K/UL (ref 150–450)
PLATELET BLD QL SMEAR: ABNORMAL
PMV BLD AUTO: ABNORMAL FL (ref 9.2–12.9)
POTASSIUM SERPL-SCNC: 4 MMOL/L (ref 3.5–5.1)
PROT SERPL-MCNC: 8.3 G/DL (ref 6–8.4)
PROT UR QL STRIP: ABNORMAL
RBC # BLD AUTO: 4.68 M/UL (ref 4–5.4)
RBC #/AREA URNS HPF: 3 /HPF (ref 0–4)
SODIUM SERPL-SCNC: 138 MMOL/L (ref 136–145)
SP GR UR STRIP: 1.03 (ref 1–1.03)
SQUAMOUS #/AREA URNS HPF: 1 /HPF
TOXICOLOGY INFORMATION: ABNORMAL
TROPONIN I SERPL DL<=0.01 NG/ML-MCNC: <0.006 NG/ML (ref 0–0.03)
UNIDENT CRYS URNS QL MICRO: NORMAL
URN SPEC COLLECT METH UR: ABNORMAL
UROBILINOGEN UR STRIP-ACNC: NEGATIVE EU/DL
WBC # BLD AUTO: 7.16 K/UL (ref 3.9–12.7)
WBC #/AREA URNS HPF: 3 /HPF (ref 0–5)

## 2024-07-16 PROCEDURE — 83880 ASSAY OF NATRIURETIC PEPTIDE: CPT | Performed by: NURSE PRACTITIONER

## 2024-07-16 PROCEDURE — 80053 COMPREHEN METABOLIC PANEL: CPT | Performed by: EMERGENCY MEDICINE

## 2024-07-16 PROCEDURE — 80307 DRUG TEST PRSMV CHEM ANLYZR: CPT | Performed by: EMERGENCY MEDICINE

## 2024-07-16 PROCEDURE — 99285 EMERGENCY DEPT VISIT HI MDM: CPT | Mod: 25

## 2024-07-16 PROCEDURE — 25000003 PHARM REV CODE 250: Performed by: EMERGENCY MEDICINE

## 2024-07-16 PROCEDURE — 82550 ASSAY OF CK (CPK): CPT | Performed by: EMERGENCY MEDICINE

## 2024-07-16 PROCEDURE — 85025 COMPLETE CBC W/AUTO DIFF WBC: CPT | Performed by: NURSE PRACTITIONER

## 2024-07-16 PROCEDURE — 93010 ELECTROCARDIOGRAM REPORT: CPT | Mod: ,,, | Performed by: INTERNAL MEDICINE

## 2024-07-16 PROCEDURE — 93005 ELECTROCARDIOGRAM TRACING: CPT

## 2024-07-16 PROCEDURE — 84484 ASSAY OF TROPONIN QUANT: CPT | Performed by: NURSE PRACTITIONER

## 2024-07-16 PROCEDURE — 86803 HEPATITIS C AB TEST: CPT | Performed by: EMERGENCY MEDICINE

## 2024-07-16 PROCEDURE — 87389 HIV-1 AG W/HIV-1&-2 AB AG IA: CPT | Performed by: EMERGENCY MEDICINE

## 2024-07-16 PROCEDURE — 96374 THER/PROPH/DIAG INJ IV PUSH: CPT

## 2024-07-16 PROCEDURE — 81000 URINALYSIS NONAUTO W/SCOPE: CPT | Mod: 59 | Performed by: NURSE PRACTITIONER

## 2024-07-16 PROCEDURE — 63600175 PHARM REV CODE 636 W HCPCS: Performed by: EMERGENCY MEDICINE

## 2024-07-16 RX ORDER — LORAZEPAM 1 MG/1
1 TABLET ORAL
Status: COMPLETED | OUTPATIENT
Start: 2024-07-16 | End: 2024-07-16

## 2024-07-16 RX ORDER — PROCHLORPERAZINE EDISYLATE 5 MG/ML
5 INJECTION INTRAMUSCULAR; INTRAVENOUS ONCE
Status: COMPLETED | OUTPATIENT
Start: 2024-07-16 | End: 2024-07-16

## 2024-07-16 RX ORDER — LORAZEPAM 1 MG/1
1 TABLET ORAL
Status: DISCONTINUED | OUTPATIENT
Start: 2024-07-16 | End: 2024-07-16 | Stop reason: HOSPADM

## 2024-07-16 RX ORDER — ACETAMINOPHEN 500 MG
1000 TABLET ORAL
Status: COMPLETED | OUTPATIENT
Start: 2024-07-16 | End: 2024-07-16

## 2024-07-16 RX ADMIN — PROCHLORPERAZINE EDISYLATE 5 MG: 5 INJECTION INTRAMUSCULAR; INTRAVENOUS at 05:07

## 2024-07-16 RX ADMIN — LORAZEPAM 1 MG: 1 TABLET ORAL at 03:07

## 2024-07-16 RX ADMIN — ACETAMINOPHEN 1000 MG: 500 TABLET ORAL at 05:07

## 2024-07-16 NOTE — ED PROVIDER NOTES
"Encounter Date: 7/16/2024       History     Chief Complaint   Patient presents with    Chest Pain     Reports intermittent mid-sternal chest pain onset 2 weeks ago. Also reports sob. States "my anxiety is through the roof". 100% RA, no resp distress noted.     Fatigue     Also reports generalized weakness and dizziness x 2 weeks. Pt ambulatory with steady gait but once sitting in chair pt slumped back in seat. States symptoms are worsening today. Woke up at 8am with worsened symptoms per pt.      Rosmery Woodard is a 64-year-old female with a past medical history of hypertension, asthma, ulcerative colitis presenting today with multiple issues.  She reports intermittent substernal chest pain that has been ongoing for the past 2 weeks with associated shortness of breath.  It does not radiate in his not exertional.  She also reports generalized weakness, fatigue and dizziness during this same time.  States it is worse when she stands to ambulate but also occurs at rest.  She describes it as room spinning and is associated with blurred vision and difficulty speaking with memory loss.  She woke up at 8 this morning she noticed the symptoms have gotten worse.  She was feeling anxious, in his tearful at times.  She states she has had an episode similar to this a year ago when she took amlodipine and not the brand Norvasc for her hypertension.  She denies recent fevers or chills.  She also states she has abdominal pain without nausea or vomiting.  No bowel movement change.  No recent falls.      Review of patient's allergies indicates:   Allergen Reactions    Versed [midazolam] Nausea And Vomiting    Amlodipine Other (See Comments)     Patient says that she does not do well with generic amlodipine but does do well with Norvasc (amlodipine).    Bentyl [dicyclomine] Hives    Darvocet a500 [propoxyphene n-acetaminophen] Hives    Toradol [ketorolac] Hives    Zofran [ondansetron hcl (pf)] Nausea And Vomiting    Morphine Nausea " "And Vomiting     22 @ 0935 Pt, stated "I take Dilaudid without any reactions to it" pt also states that she "can take oxycodone for pain."      Past Medical History:   Diagnosis Date    Asthma     Hypertension     Liver abscess     hx    Portal vein thrombosis 10/2013    Portal vein thrombosis 10/2013    Ulcerative colitis 9/15/2013     Past Surgical History:   Procedure Laterality Date    ABDOMINAL SURGERY      3 exploratory surgeries    APPENDECTOMY       SECTION, LOW TRANSVERSE      3 C-sections    CHOLECYSTECTOMY      COLONOSCOPY      COLONOSCOPY N/A 2016    Procedure: COLONOSCOPY;  Surgeon: Brad King MD;  Location: [facility] ([address] [place] [address]);  Service: Endoscopy;  Laterality: N/A;    EXPLORATORY LAPAROTOMY W/ BOWEL RESECTION      HERNIA REPAIR      HYSTERECTOMY      INCISIONAL HERNIA REPAIR      UPPER GASTROINTESTINAL ENDOSCOPY      URETERAL STENT PLACEMENT Left 2022    Procedure: INSERTION, STENT, URETER;  Surgeon: Mann Geronimo MD;  Location: [facility] OR;  Service: Urology;  Laterality: Left;     Family History   Problem Relation Name Age of Onset    Hypertension Maternal Grandmother      Cirrhosis Maternal Aunt      Breast cancer Maternal Aunt      Colon cancer Maternal Uncle      Breast cancer Cousin      Celiac disease Neg Hx      Colon polyps Neg Hx      Crohn's disease Neg Hx      Cystic fibrosis Neg Hx      Esophageal cancer Neg Hx      Hemochromatosis Neg Hx      Inflammatory bowel disease Neg Hx      Irritable bowel syndrome Neg Hx      Liver cancer Neg Hx      Liver disease Neg Hx      Rectal cancer Neg Hx      Stomach cancer Neg Hx      Ulcerative colitis Neg Hx      Scott's disease Neg Hx       Social History     Tobacco Use    Smoking status: Former     Current packs/day: 0.00     Average packs/day: 0.1 packs/day for 0.5 years     Types: Cigarettes     Start date: 3/16/1976     Quit date: 1976     Years since quittin.8    Smokeless tobacco: Never "   Substance Use Topics    Alcohol use: Yes     Alcohol/week: 1.0 standard drink of alcohol     Types: 1 Glasses of wine per week     Comment: rare    Drug use: No     Review of Systems    Physical Exam     Initial Vitals [07/16/24 1442]   BP Pulse Resp Temp SpO2   (!) 168/85 65 20 98 °F (36.7 °C) 100 %      MAP       --         Physical Exam    Nursing note and vitals reviewed.  Constitutional: She appears well-developed and well-nourished. No distress.   HENT:   Mouth/Throat: Oropharynx is clear and moist.   Eyes: Conjunctivae are normal. No scleral icterus.   Neck: No JVD present.   Cardiovascular:  Normal rate, regular rhythm and intact distal pulses.           Pulmonary/Chest: Breath sounds normal. No respiratory distress. She has no wheezes. She has no rales.   Abdominal: Abdomen is soft. Bowel sounds are normal. She exhibits no distension. There is no abdominal tenderness. There is no rebound.   Musculoskeletal:         General: No edema.     Lymphadenopathy:     She has no cervical adenopathy.   Neurological: She is alert and oriented to person, place, and time. She has normal strength. No cranial nerve deficit or sensory deficit.   + can do finger-to-nose, heel-to-shin but slow  + stuttering, slow speech   Skin: Skin is warm. No rash noted.         ED Course   Procedures  Labs Reviewed   CBC W/ AUTO DIFFERENTIAL - Abnormal; Notable for the following components:       Result Value    RDW 14.9 (*)     Gran % 36.1 (*)     Lymph % 53.8 (*)     Platelet Estimate Clumped (*)     All other components within normal limits   URINALYSIS, REFLEX TO URINE CULTURE - Abnormal; Notable for the following components:    Protein, UA Trace (*)     Occult Blood UA Trace (*)     Leukocytes, UA 1+ (*)     All other components within normal limits    Narrative:     Specimen Source->Urine   DRUG SCREEN PANEL, URINE EMERGENCY - Abnormal; Notable for the following components:    Benzodiazepines Presumptive Positive (*)      Creatinine, Urine 344.8 (*)     All other components within normal limits    Narrative:     Specimen Source->Urine   COMPREHENSIVE METABOLIC PANEL - Abnormal; Notable for the following components:    CO2 21 (*)     eGFR 56 (*)     All other components within normal limits   TROPONIN I   B-TYPE NATRIURETIC PEPTIDE   HIV 1 / 2 ANTIBODY    Narrative:     Release to patient->Immediate   HEPATITIS C ANTIBODY    Narrative:     Release to patient->Immediate   URINALYSIS MICROSCOPIC    Narrative:     Specimen Source->Urine   CK     EKG Readings: (Independently Interpreted)   EKG:  Sinus rhythm at 66 with normal intervals, normal axis, nonspecific ST change.  No STEMI.       Imaging Results              CT Head Without Contrast (Final result)  Result time 07/16/24 16:31:09      Final result by Martin Mcarthur MD (07/16/24 16:31:09)                   Impression:      No acute abnormality.    Sequelae of microvascular ischemic change in a similar distribution to prior MRI.      Electronically signed by: Martin Mcarthur MD  Date:    07/16/2024  Time:    16:31               Narrative:    EXAMINATION:  CT HEAD WITHOUT CONTRAST    CLINICAL HISTORY:  Headache, new or worsening (Age >= 50y);    TECHNIQUE:  Low dose axial CT images obtained throughout the head without intravenous contrast. Sagittal and coronal reconstructions were performed.    COMPARISON:  05/26/2023    FINDINGS:  Intracranial compartment:    Ventricles and sulci are normal in size for age without evidence of hydrocephalus. No extra-axial blood or fluid collections.    Sequelae of microvascular ischemic change as seen on prior MRIs again noted, similar in distribution and extent.  No parenchymal mass, hemorrhage, edema or major vascular distribution infarct.    Skull/extracranial contents (limited evaluation): No fracture. Mastoid air cells and paranasal sinuses are essentially clear.                                    X-Rays:   Independently Interpreted  Readings:   Other Readings:  CT head reviewed and independently interpreted by me as no bleed or mass    Medications   LORazepam tablet 1 mg (1 mg Oral Given 7/16/24 1523)   acetaminophen tablet 1,000 mg (1,000 mg Oral Given 7/16/24 1723)   prochlorperazine injection Soln 5 mg (5 mg Intravenous Given 7/16/24 1739)     Medical Decision Making  Emergent evaluation a 64-year-old female presenting with 2 week history of dizziness, chest pain, memory changes with started during speech, worse today.    Vital signs reviewed, hypertensive otherwise stable.    Neuro exam is nonfocal but slow, stuttering speech is present.  Can do coordination testing but slow as well.    Differential includes but not limited to CVA, intracranial hemorrhage, intracranial mass, electrolyte derangement, metabolic encephalopathy, ACS, anxiety, low suspicion for infectious process    Plan:  Labs  EKG  Cardiac monitoring  CT head  Ativan 1 mg PO for anxiety    Amount and/or Complexity of Data Reviewed  Labs: ordered. Decision-making details documented in ED Course.  Radiology: ordered and independent interpretation performed.  ECG/medicine tests: ordered and independent interpretation performed.    Risk  OTC drugs.  Prescription drug management.               ED Course as of 07/17/24 0111   Tue Jul 16, 2024   1627 CT head reviewed and independently interpreted by me as no acute mass or hemorrhage [GM]   1708 WBC: 7.16 [GM]   1708 Hemoglobin: 13.2 [GM]   1726 BNP: <10 [GM]   1730 Patient re-evaluated, vital signs are stable.    Tylenol ordered for headache.  Difficulty obtaining labs due to difficult IV access.  CMP hemolyzed, pending repeat.    Patient updated with current results.  Her speech seems to improved after the Ativan.      [GM]   1814 CPK: 65 [GM]   1814 Potassium: 4.0 [GM]   1814 BUN: 16 [GM]   1814 Creatinine: 1.1 [GM]   1814 Workup overall negative today.     I I have reviewed her results with her at bedside.  He is still feels  anxious.  She takes Ativan 2 times a day.  I will give her another dose prior to discharge.      She was an ophthalmology appointment later this month, I think she should move her appointment up if possible.    I have also recommended she follow up with her PCP if she continues to have these symptoms.      I largely suspect this is likely anxiety related however, she may require further outpatient testing if her symptoms persist. [GM]      ED Course User Index  [GM] Blanca Bledsoe MD                           Clinical Impression:  Final diagnoses:  [R07.9] Chest pain  [R53.1] Weakness (Primary)  [R42] Dizziness                 Blanca Bledsoe MD  07/17/24 0115

## 2024-07-16 NOTE — FIRST PROVIDER EVALUATION
" Emergency Department TeleTriage Encounter Note      CHIEF COMPLAINT    Chief Complaint   Patient presents with    Chest Pain     Reports intermittent mid-sternal chest pain onset 2 weeks ago. Also reports sob. States "my anxiety is through the roof". 100% RA, no resp distress noted.     Fatigue     Also reports generalized weakness and dizziness x 2 weeks. Pt ambulatory with steady gait but once sitting in chair pt slumped back in seat. States symptoms are worsening today. Woke up at 8am with worsened symptoms per pt.        VITAL SIGNS   Initial Vitals [07/16/24 1442]   BP Pulse Resp Temp SpO2   (!) 168/85 65 20 98 °F (36.7 °C) 100 %      MAP       --            ALLERGIES    Review of patient's allergies indicates:   Allergen Reactions    Versed [midazolam] Nausea And Vomiting    Amlodipine Other (See Comments)     Patient says that she does not do well with generic amlodipine but does do well with Norvasc (amlodipine).    Bentyl [dicyclomine] Hives    Darvocet a500 [propoxyphene n-acetaminophen] Hives    Toradol [ketorolac] Hives    Zofran [ondansetron hcl (pf)] Nausea And Vomiting    Morphine Nausea And Vomiting     12/7/22 @ 0935 Pt, stated "I take Dilaudid without any reactions to it" pt also states that she "can take oxycodone for pain."        PROVIDER TRIAGE NOTE  64 year old female presents to the ER with complaints of intermittent chest pain x 2 weeks. Reports no alleviating or exacerbating factors. Reports associated SOB and anxiousness. Also reports hands cramping and generalized fatigue. Hx of UC and asthma.     AAOx3, respirations even and non- labored, stable vitals, normal coloration of skin, sitting upright in triage chair, appears in no acute distress.          ORDERS  Labs Reviewed   CBC W/ AUTO DIFFERENTIAL   COMPREHENSIVE METABOLIC PANEL   TROPONIN I   B-TYPE NATRIURETIC PEPTIDE       ED Orders (720h ago, onward)      Start Ordered     Status Ordering Provider    07/16/24 1924 07/16/24 9358  " Troponin I #2  Once Timed         Ordered BHARATBILLJuli    07/16/24 1439 07/16/24 1438  Vital signs  Every 15 min         Ordered BHARAT, BILL PADGETTJuli    07/16/24 1439 07/16/24 1438  Cardiac Monitoring - Adult  Continuous        Comments: Notify Physician If:    Ordered BHARAT, BILL PADGETTJuli    07/16/24 1439 07/16/24 1438  Pulse Oximetry Continuous  Continuous         Ordered BHARAT BILL PADGETTJuli    07/16/24 1439 07/16/24 1438  Diet NPO  Diet effective now         Ordered BHARAT, BILL PADGETTJuli    07/16/24 1439 07/16/24 1438  Saline lock IV  Once         Ordered BHARAT, BILL PADGETTJuli    07/16/24 1439 07/16/24 1438  EKG 12-lead  Once        Comments: Do not perform if previously done during this visit/ triage    Ordered BHARAT BILL PADGETTJuli    07/16/24 1439 07/16/24 1438  CBC auto differential  STAT         Ordered BHARAT BILL PADGETTJuli    07/16/24 1439 07/16/24 1438  Comprehensive metabolic panel  STAT         Ordered BHARAT BILL PADGETTJuli    07/16/24 1439 07/16/24 1438  Troponin I #1  STAT         Ordered BHARAT BILL PADGETTJuli    07/16/24 1439 07/16/24 1438  B-Type natriuretic peptide (BNP)  STAT         Ordered BHARAT, BILL PADGETTJuli              Virtual Visit Note: The provider triage portion of this emergency department evaluation and documentation was performed via [employer], a HIPAA-compliant telemedicine application, in concert with a tele-presenter in the room. A face to face patient evaluation with one of my colleagues will occur once the patient is placed in an emergency department room.      DISCLAIMER: This note was prepared with [employer] voice recognition transcription software. Garbled syntax, mangled pronouns, and other bizarre constructions may be attributed to that software system.

## 2024-07-16 NOTE — DISCHARGE INSTRUCTIONS
We did a pretty extensive workup in the emergency department and the results are all very reassuring.  If he continued to have symptoms, you will need to see your primary doctor for further outpatient testing.  I suggest you call the ophthalmology office and tried to move her appointment up.  Return to emergency department for any concerning symptom.

## 2024-09-11 ENCOUNTER — HOSPITAL ENCOUNTER (OUTPATIENT)
Facility: HOSPITAL | Age: 64
Discharge: HOME | End: 2024-09-13
Attending: STUDENT IN AN ORGANIZED HEALTH CARE EDUCATION/TRAINING PROGRAM | Admitting: STUDENT IN AN ORGANIZED HEALTH CARE EDUCATION/TRAINING PROGRAM
Payer: MEDICARE

## 2024-09-11 DIAGNOSIS — R07.9 CHEST PAIN: ICD-10-CM

## 2024-09-11 DIAGNOSIS — T42.4X1A BENZODIAZEPINE OVERDOSE: Primary | ICD-10-CM

## 2024-09-11 DIAGNOSIS — F13.20 BENZODIAZEPINE DEPENDENCE: Chronic | ICD-10-CM

## 2024-09-11 DIAGNOSIS — R41.82 AMS (ALTERED MENTAL STATUS): ICD-10-CM

## 2024-09-11 DIAGNOSIS — M25.512 ACUTE PAIN OF LEFT SHOULDER: ICD-10-CM

## 2024-09-11 LAB
ALBUMIN SERPL BCP-MCNC: 3.8 G/DL (ref 3.5–5.2)
ALP SERPL-CCNC: 104 U/L (ref 55–135)
ALT SERPL W/O P-5'-P-CCNC: 23 U/L (ref 10–44)
AMPHET+METHAMPHET UR QL: NEGATIVE
ANION GAP SERPL CALC-SCNC: 16 MMOL/L (ref 8–16)
APAP SERPL-MCNC: <3 UG/ML (ref 10–20)
AST SERPL-CCNC: 40 U/L (ref 10–40)
BACTERIA #/AREA URNS AUTO: ABNORMAL /HPF
BARBITURATES UR QL SCN>200 NG/ML: NEGATIVE
BASOPHILS # BLD AUTO: 0.06 K/UL (ref 0–0.2)
BASOPHILS NFR BLD: 0.4 % (ref 0–1.9)
BENZODIAZ UR QL SCN>200 NG/ML: ABNORMAL
BILIRUB SERPL-MCNC: 0.3 MG/DL (ref 0.1–1)
BILIRUB UR QL STRIP: NEGATIVE
BUN SERPL-MCNC: 13 MG/DL (ref 8–23)
BZE UR QL SCN: ABNORMAL
CALCIUM SERPL-MCNC: 9.5 MG/DL (ref 8.7–10.5)
CANNABINOIDS UR QL SCN: NEGATIVE
CHLORIDE SERPL-SCNC: 104 MMOL/L (ref 95–110)
CLARITY UR REFRACT.AUTO: CLEAR
CO2 SERPL-SCNC: 20 MMOL/L (ref 23–29)
COLOR UR AUTO: COLORLESS
CREAT SERPL-MCNC: 1.3 MG/DL (ref 0.5–1.4)
CREAT UR-MCNC: 57 MG/DL (ref 15–325)
DIFFERENTIAL METHOD BLD: ABNORMAL
EOSINOPHIL # BLD AUTO: 0.5 K/UL (ref 0–0.5)
EOSINOPHIL NFR BLD: 2.8 % (ref 0–8)
ERYTHROCYTE [DISTWIDTH] IN BLOOD BY AUTOMATED COUNT: 15.4 % (ref 11.5–14.5)
EST. GFR  (NO RACE VARIABLE): 45.9 ML/MIN/1.73 M^2
ETHANOL SERPL-MCNC: <10 MG/DL
GLUCOSE SERPL-MCNC: 144 MG/DL (ref 70–110)
GLUCOSE UR QL STRIP: ABNORMAL
HCT VFR BLD AUTO: 42.9 % (ref 37–48.5)
HGB BLD-MCNC: 13.9 G/DL (ref 12–16)
HGB UR QL STRIP: NEGATIVE
HYALINE CASTS UR QL AUTO: 9 /LPF
IMM GRANULOCYTES # BLD AUTO: 0.09 K/UL (ref 0–0.04)
IMM GRANULOCYTES NFR BLD AUTO: 0.5 % (ref 0–0.5)
KETONES UR QL STRIP: NEGATIVE
LEUKOCYTE ESTERASE UR QL STRIP: ABNORMAL
LYMPHOCYTES # BLD AUTO: 4.5 K/UL (ref 1–4.8)
LYMPHOCYTES NFR BLD: 26.1 % (ref 18–48)
MAGNESIUM SERPL-MCNC: 2 MG/DL (ref 1.6–2.6)
MCH RBC QN AUTO: 28 PG (ref 27–31)
MCHC RBC AUTO-ENTMCNC: 32.4 G/DL (ref 32–36)
MCV RBC AUTO: 87 FL (ref 82–98)
METHADONE UR QL SCN>300 NG/ML: NEGATIVE
MICROSCOPIC COMMENT: ABNORMAL
MONOCYTES # BLD AUTO: 0.6 K/UL (ref 0.3–1)
MONOCYTES NFR BLD: 3.7 % (ref 4–15)
NEUTROPHILS # BLD AUTO: 11.4 K/UL (ref 1.8–7.7)
NEUTROPHILS NFR BLD: 66.5 % (ref 38–73)
NITRITE UR QL STRIP: NEGATIVE
NRBC BLD-RTO: 0 /100 WBC
OPIATES UR QL SCN: NEGATIVE
PCP UR QL SCN>25 NG/ML: NEGATIVE
PH UR STRIP: 7 [PH] (ref 5–8)
PLATELET # BLD AUTO: 351 K/UL (ref 150–450)
PMV BLD AUTO: 11.1 FL (ref 9.2–12.9)
POTASSIUM SERPL-SCNC: 3.2 MMOL/L (ref 3.5–5.1)
PROT SERPL-MCNC: 8.5 G/DL (ref 6–8.4)
PROT UR QL STRIP: NEGATIVE
RBC # BLD AUTO: 4.96 M/UL (ref 4–5.4)
RBC #/AREA URNS AUTO: 1 /HPF (ref 0–4)
SODIUM SERPL-SCNC: 140 MMOL/L (ref 136–145)
SP GR UR STRIP: 1.01 (ref 1–1.03)
SQUAMOUS #/AREA URNS AUTO: 3 /HPF
TOXICOLOGY INFORMATION: ABNORMAL
TSH SERPL DL<=0.005 MIU/L-ACNC: 2.51 UIU/ML (ref 0.4–4)
URN SPEC COLLECT METH UR: ABNORMAL
WBC # BLD AUTO: 17.09 K/UL (ref 3.9–12.7)
WBC #/AREA URNS AUTO: 2 /HPF (ref 0–5)

## 2024-09-11 PROCEDURE — G0378 HOSPITAL OBSERVATION PER HR: HCPCS

## 2024-09-11 PROCEDURE — 83735 ASSAY OF MAGNESIUM: CPT | Performed by: STUDENT IN AN ORGANIZED HEALTH CARE EDUCATION/TRAINING PROGRAM

## 2024-09-11 PROCEDURE — 80143 DRUG ASSAY ACETAMINOPHEN: CPT | Performed by: STUDENT IN AN ORGANIZED HEALTH CARE EDUCATION/TRAINING PROGRAM

## 2024-09-11 PROCEDURE — 80053 COMPREHEN METABOLIC PANEL: CPT | Performed by: STUDENT IN AN ORGANIZED HEALTH CARE EDUCATION/TRAINING PROGRAM

## 2024-09-11 PROCEDURE — 99285 EMERGENCY DEPT VISIT HI MDM: CPT | Mod: 25

## 2024-09-11 PROCEDURE — 93010 ELECTROCARDIOGRAM REPORT: CPT | Mod: ,,, | Performed by: INTERNAL MEDICINE

## 2024-09-11 PROCEDURE — 93005 ELECTROCARDIOGRAM TRACING: CPT

## 2024-09-11 PROCEDURE — 80307 DRUG TEST PRSMV CHEM ANLYZR: CPT | Mod: 91 | Performed by: STUDENT IN AN ORGANIZED HEALTH CARE EDUCATION/TRAINING PROGRAM

## 2024-09-11 PROCEDURE — 25000003 PHARM REV CODE 250: Performed by: STUDENT IN AN ORGANIZED HEALTH CARE EDUCATION/TRAINING PROGRAM

## 2024-09-11 PROCEDURE — 81001 URINALYSIS AUTO W/SCOPE: CPT | Performed by: STUDENT IN AN ORGANIZED HEALTH CARE EDUCATION/TRAINING PROGRAM

## 2024-09-11 PROCEDURE — 85025 COMPLETE CBC W/AUTO DIFF WBC: CPT | Performed by: STUDENT IN AN ORGANIZED HEALTH CARE EDUCATION/TRAINING PROGRAM

## 2024-09-11 PROCEDURE — 84443 ASSAY THYROID STIM HORMONE: CPT | Performed by: STUDENT IN AN ORGANIZED HEALTH CARE EDUCATION/TRAINING PROGRAM

## 2024-09-11 PROCEDURE — 82077 ASSAY SPEC XCP UR&BREATH IA: CPT | Performed by: STUDENT IN AN ORGANIZED HEALTH CARE EDUCATION/TRAINING PROGRAM

## 2024-09-11 PROCEDURE — 80307 DRUG TEST PRSMV CHEM ANLYZR: CPT | Performed by: STUDENT IN AN ORGANIZED HEALTH CARE EDUCATION/TRAINING PROGRAM

## 2024-09-11 PROCEDURE — 96360 HYDRATION IV INFUSION INIT: CPT

## 2024-09-11 RX ORDER — ONDANSETRON 8 MG/1
8 TABLET, ORALLY DISINTEGRATING ORAL EVERY 8 HOURS PRN
Status: DISCONTINUED | OUTPATIENT
Start: 2024-09-12 | End: 2024-09-13 | Stop reason: HOSPADM

## 2024-09-11 RX ORDER — ATORVASTATIN CALCIUM 10 MG/1
10 TABLET, FILM COATED ORAL DAILY
Status: DISCONTINUED | OUTPATIENT
Start: 2024-09-12 | End: 2024-09-13 | Stop reason: HOSPADM

## 2024-09-11 RX ORDER — SIMETHICONE 80 MG
1 TABLET,CHEWABLE ORAL 4 TIMES DAILY PRN
Status: DISCONTINUED | OUTPATIENT
Start: 2024-09-12 | End: 2024-09-13 | Stop reason: HOSPADM

## 2024-09-11 RX ORDER — ALUMINUM HYDROXIDE, MAGNESIUM HYDROXIDE, AND SIMETHICONE 1200; 120; 1200 MG/30ML; MG/30ML; MG/30ML
30 SUSPENSION ORAL 4 TIMES DAILY PRN
Status: DISCONTINUED | OUTPATIENT
Start: 2024-09-12 | End: 2024-09-13 | Stop reason: HOSPADM

## 2024-09-11 RX ORDER — POLYETHYLENE GLYCOL 3350 17 G/17G
17 POWDER, FOR SOLUTION ORAL DAILY PRN
Status: DISCONTINUED | OUTPATIENT
Start: 2024-09-12 | End: 2024-09-13 | Stop reason: HOSPADM

## 2024-09-11 RX ORDER — ACETAMINOPHEN 325 MG/1
650 TABLET ORAL EVERY 8 HOURS PRN
Status: DISCONTINUED | OUTPATIENT
Start: 2024-09-12 | End: 2024-09-13 | Stop reason: HOSPADM

## 2024-09-11 RX ORDER — IBUPROFEN 200 MG
24 TABLET ORAL
Status: DISCONTINUED | OUTPATIENT
Start: 2024-09-12 | End: 2024-09-13 | Stop reason: HOSPADM

## 2024-09-11 RX ORDER — IBUPROFEN 200 MG
16 TABLET ORAL
Status: DISCONTINUED | OUTPATIENT
Start: 2024-09-12 | End: 2024-09-13 | Stop reason: HOSPADM

## 2024-09-11 RX ORDER — ACETAMINOPHEN 325 MG/1
650 TABLET ORAL EVERY 4 HOURS PRN
Status: DISCONTINUED | OUTPATIENT
Start: 2024-09-12 | End: 2024-09-13 | Stop reason: HOSPADM

## 2024-09-11 RX ORDER — GLUCAGON 1 MG
1 KIT INJECTION
Status: DISCONTINUED | OUTPATIENT
Start: 2024-09-12 | End: 2024-09-13 | Stop reason: HOSPADM

## 2024-09-11 RX ORDER — SODIUM CHLORIDE 0.9 % (FLUSH) 0.9 %
1-10 SYRINGE (ML) INJECTION EVERY 12 HOURS PRN
Status: DISCONTINUED | OUTPATIENT
Start: 2024-09-12 | End: 2024-09-13 | Stop reason: HOSPADM

## 2024-09-11 RX ORDER — NALOXONE HYDROCHLORIDE 4 MG/.1ML
SPRAY NASAL
Qty: 1 EACH | Refills: 11 | Status: CANCELLED | OUTPATIENT
Start: 2024-09-11

## 2024-09-11 RX ORDER — NALOXONE HCL 0.4 MG/ML
0.02 VIAL (ML) INJECTION
Status: DISCONTINUED | OUTPATIENT
Start: 2024-09-12 | End: 2024-09-13 | Stop reason: HOSPADM

## 2024-09-11 RX ORDER — TALC
6 POWDER (GRAM) TOPICAL NIGHTLY PRN
Status: DISCONTINUED | OUTPATIENT
Start: 2024-09-12 | End: 2024-09-13 | Stop reason: HOSPADM

## 2024-09-11 RX ORDER — AMLODIPINE BESYLATE 10 MG/1
10 TABLET ORAL DAILY
Status: DISCONTINUED | OUTPATIENT
Start: 2024-09-12 | End: 2024-09-13 | Stop reason: HOSPADM

## 2024-09-11 RX ORDER — POTASSIUM CHLORIDE 20 MEQ/1
40 TABLET, EXTENDED RELEASE ORAL EVERY 6 HOURS
Status: COMPLETED | OUTPATIENT
Start: 2024-09-12 | End: 2024-09-12

## 2024-09-11 RX ADMIN — SODIUM CHLORIDE 1000 ML: 9 INJECTION, SOLUTION INTRAVENOUS at 08:09

## 2024-09-12 PROBLEM — Z79.899 POLYPHARMACY: Chronic | Status: ACTIVE | Noted: 2024-09-12

## 2024-09-12 PROBLEM — G92.9 ENCEPHALOPATHY, TOXIC: Status: ACTIVE | Noted: 2024-09-12

## 2024-09-12 PROBLEM — F13.20 BENZODIAZEPINE DEPENDENCE: Chronic | Status: ACTIVE | Noted: 2024-09-12

## 2024-09-12 PROBLEM — D72.829 LEUKOCYTOSIS: Status: ACTIVE | Noted: 2024-09-12

## 2024-09-12 PROBLEM — F19.10 POLYSUBSTANCE ABUSE: Chronic | Status: ACTIVE | Noted: 2024-09-12

## 2024-09-12 LAB
ALBUMIN SERPL BCP-MCNC: 3 G/DL (ref 3.5–5.2)
ALP SERPL-CCNC: 85 U/L (ref 55–135)
ALT SERPL W/O P-5'-P-CCNC: 16 U/L (ref 10–44)
ANION GAP SERPL CALC-SCNC: 10 MMOL/L (ref 8–16)
AST SERPL-CCNC: 18 U/L (ref 10–40)
BASOPHILS # BLD AUTO: 0.04 K/UL (ref 0–0.2)
BASOPHILS NFR BLD: 0.4 % (ref 0–1.9)
BILIRUB SERPL-MCNC: 0.4 MG/DL (ref 0.1–1)
BUN SERPL-MCNC: 13 MG/DL (ref 8–23)
CALCIUM SERPL-MCNC: 8.5 MG/DL (ref 8.7–10.5)
CHLORIDE SERPL-SCNC: 107 MMOL/L (ref 95–110)
CO2 SERPL-SCNC: 24 MMOL/L (ref 23–29)
CREAT SERPL-MCNC: 1.2 MG/DL (ref 0.5–1.4)
CREAT UR-MCNC: 57 MG/DL (ref 15–325)
DIFFERENTIAL METHOD BLD: ABNORMAL
EOSINOPHIL # BLD AUTO: 0.3 K/UL (ref 0–0.5)
EOSINOPHIL NFR BLD: 2.9 % (ref 0–8)
ERYTHROCYTE [DISTWIDTH] IN BLOOD BY AUTOMATED COUNT: 15.5 % (ref 11.5–14.5)
EST. GFR  (NO RACE VARIABLE): 50.5 ML/MIN/1.73 M^2
FENTANYL UR QL SCN: ABNORMAL
GLUCOSE SERPL-MCNC: 120 MG/DL (ref 70–110)
HCT VFR BLD AUTO: 35.2 % (ref 37–48.5)
HGB BLD-MCNC: 11.4 G/DL (ref 12–16)
IMM GRANULOCYTES # BLD AUTO: 0.06 K/UL (ref 0–0.04)
IMM GRANULOCYTES NFR BLD AUTO: 0.6 % (ref 0–0.5)
LYMPHOCYTES # BLD AUTO: 2.8 K/UL (ref 1–4.8)
LYMPHOCYTES NFR BLD: 26.7 % (ref 18–48)
MCH RBC QN AUTO: 28.5 PG (ref 27–31)
MCHC RBC AUTO-ENTMCNC: 32.4 G/DL (ref 32–36)
MCV RBC AUTO: 88 FL (ref 82–98)
MONOCYTES # BLD AUTO: 0.5 K/UL (ref 0.3–1)
MONOCYTES NFR BLD: 5.2 % (ref 4–15)
NEUTROPHILS # BLD AUTO: 6.7 K/UL (ref 1.8–7.7)
NEUTROPHILS NFR BLD: 64.2 % (ref 38–73)
NRBC BLD-RTO: 0 /100 WBC
OHS QRS DURATION: 104 MS
OHS QTC CALCULATION: 385 MS
PLATELET # BLD AUTO: 353 K/UL (ref 150–450)
PMV BLD AUTO: 11.1 FL (ref 9.2–12.9)
POTASSIUM SERPL-SCNC: 3.6 MMOL/L (ref 3.5–5.1)
PROT SERPL-MCNC: 6.7 G/DL (ref 6–8.4)
RBC # BLD AUTO: 4 M/UL (ref 4–5.4)
SODIUM SERPL-SCNC: 141 MMOL/L (ref 136–145)
WBC # BLD AUTO: 10.43 K/UL (ref 3.9–12.7)

## 2024-09-12 PROCEDURE — 85025 COMPLETE CBC W/AUTO DIFF WBC: CPT

## 2024-09-12 PROCEDURE — G0378 HOSPITAL OBSERVATION PER HR: HCPCS

## 2024-09-12 PROCEDURE — 36415 COLL VENOUS BLD VENIPUNCTURE: CPT

## 2024-09-12 PROCEDURE — 25000003 PHARM REV CODE 250: Performed by: STUDENT IN AN ORGANIZED HEALTH CARE EDUCATION/TRAINING PROGRAM

## 2024-09-12 PROCEDURE — 99214 OFFICE O/P EST MOD 30 MIN: CPT | Mod: ,,, | Performed by: PSYCHIATRY & NEUROLOGY

## 2024-09-12 PROCEDURE — 80053 COMPREHEN METABOLIC PANEL: CPT

## 2024-09-12 PROCEDURE — 25000003 PHARM REV CODE 250

## 2024-09-12 PROCEDURE — 94761 N-INVAS EAR/PLS OXIMETRY MLT: CPT

## 2024-09-12 RX ORDER — METHOCARBAMOL 500 MG/1
1000 TABLET, FILM COATED ORAL 4 TIMES DAILY
Status: DISCONTINUED | OUTPATIENT
Start: 2024-09-12 | End: 2024-09-13 | Stop reason: HOSPADM

## 2024-09-12 RX ORDER — LIDOCAINE 50 MG/G
2 PATCH TOPICAL
Status: DISCONTINUED | OUTPATIENT
Start: 2024-09-12 | End: 2024-09-12

## 2024-09-12 RX ORDER — MIRTAZAPINE 15 MG/1
15 TABLET, FILM COATED ORAL NIGHTLY
Status: DISCONTINUED | OUTPATIENT
Start: 2024-09-12 | End: 2024-09-13 | Stop reason: HOSPADM

## 2024-09-12 RX ORDER — LIDOCAINE 50 MG/G
3 PATCH TOPICAL
Status: DISCONTINUED | OUTPATIENT
Start: 2024-09-12 | End: 2024-09-13 | Stop reason: HOSPADM

## 2024-09-12 RX ADMIN — LIDOCAINE 5% 2 PATCH: 700 PATCH TOPICAL at 11:09

## 2024-09-12 RX ADMIN — THERA TABS 1 TABLET: TAB at 08:09

## 2024-09-12 RX ADMIN — POTASSIUM CHLORIDE 40 MEQ: 1500 TABLET, EXTENDED RELEASE ORAL at 05:09

## 2024-09-12 RX ADMIN — POTASSIUM CHLORIDE 40 MEQ: 1500 TABLET, EXTENDED RELEASE ORAL at 11:09

## 2024-09-12 RX ADMIN — METHOCARBAMOL 1000 MG: 500 TABLET ORAL at 10:09

## 2024-09-12 RX ADMIN — METHOCARBAMOL 1000 MG: 500 TABLET ORAL at 01:09

## 2024-09-12 RX ADMIN — LIDOCAINE 3 PATCH: 50 PATCH CUTANEOUS at 04:09

## 2024-09-12 RX ADMIN — MIRTAZAPINE 15 MG: 15 TABLET, FILM COATED ORAL at 10:09

## 2024-09-12 RX ADMIN — ACETAMINOPHEN 650 MG: 325 TABLET, FILM COATED ORAL at 10:09

## 2024-09-12 RX ADMIN — ACETAMINOPHEN 650 MG: 325 TABLET, FILM COATED ORAL at 02:09

## 2024-09-12 RX ADMIN — ACETAMINOPHEN 650 MG: 325 TABLET, FILM COATED ORAL at 12:09

## 2024-09-12 RX ADMIN — ATORVASTATIN CALCIUM 10 MG: 10 TABLET, FILM COATED ORAL at 08:09

## 2024-09-12 RX ADMIN — METHOCARBAMOL 1000 MG: 500 TABLET ORAL at 04:09

## 2024-09-12 NOTE — ASSESSMENT & PLAN NOTE
Leukocytosis noted, without evidence of infection or sepsis. Likely stress response from overdose/Narcan. Antibiotics not indicated.

## 2024-09-12 NOTE — PROGRESS NOTES
"[facility] - Internal [facility] Medicine  Progress Note    Patient Name: Rosmery Woodard  MRN: [id number]  Patient Class: OP- Observation   Admission Date: 9/11/2024  Length of Stay: 0 days  Attending Physician: Ace Lira MD  Primary Care Provider: Yuridia, Primary Doctor        Subjective:     Principal Problem:Encephalopathy, toxic        HPI:  Rosmery Woodard is a 64 y.o. female with HTN, CKD, anxiety on chronic benzos, neuropathy who presents today with acute encephalopathy.     History provided mainly by ED due to somnolence and patient noncooperation with interview.     She was reportedly brought in by EMS by being found by her boyfriend with decreased mentation. Per EMS, responded to Narcan, but somnolence returned. She tells me that she thinks "anxiety" caused her presentation and that she does cocaine (last use 2 days ago) but otherwise provides little history.     Overall, workup unremarkable. UDS positive for benzos and cocaine. Had mild leukocytosis without evidence of infection. Hospital medicine consulted for likely illicit substance overdose.     Overview/Hospital Course:  Rosmery Woodard is admitted to  for management of toxic encephalopathy 2/2 recreational drug us. GCS improved s/p narcan while en route with EMS. CXR w/out large consolidation. CTH reviewed. Leukocytosis improved. UDS positive for benzos, cocaine, fentanyl. Lytes replaced. Addiction Psych consulted.    Interval History: AF, HDS. Tearful affect, reports L shoulder and L chest wall tenderness, remorseful for drug use. Lytes improved. Pain control. F/u addiction psych.    Review of Systems   Constitutional:  Negative for chills and fever.   Respiratory:  Negative for chest tightness and shortness of breath.    Cardiovascular:  Negative for chest pain and leg swelling.   Gastrointestinal:  Negative for abdominal pain and nausea.   Neurological:  Negative for dizziness and weakness.   Psychiatric/Behavioral:  The patient " is nervous/anxious.      Objective:     Vital Signs (Most Recent):  Temp: 98 °F (36.7 °C) (09/12/24 1106)  Pulse: 76 (09/12/24 1106)  Resp: 16 (09/12/24 1106)  BP: (!) 135/93 (09/12/24 1106)  SpO2: 96 % (09/12/24 1106) Vital Signs (24h Range):  Temp:  [96.6 °F (35.9 °C)-98.4 °F (36.9 °C)] 98 °F (36.7 °C)  Pulse:  [75-90] 76  Resp:  [12-28] 16  SpO2:  [94 %-100 %] 96 %  BP: (111-158)/(74-93) 135/93     Weight: 77.1 kg (169 lb 15.6 oz)  Body mass index is 30.11 kg/m².    Intake/Output Summary (Last 24 hours) at 9/12/2024 1210  Last data filed at 9/12/2024 0827  Gross per 24 hour   Intake 1720 ml   Output 500 ml   Net 1220 ml         Physical Exam  Vitals and nursing note reviewed.   Constitutional:       Appearance: She is well-developed. She is obese.   Eyes:      Pupils: Pupils are equal, round, and reactive to light.   Cardiovascular:      Rate and Rhythm: Normal rate and regular rhythm.   Pulmonary:      Effort: Pulmonary effort is normal.      Breath sounds: Normal breath sounds.   Chest:      Chest wall: Tenderness present.   Abdominal:      Palpations: Abdomen is soft.      Tenderness: There is no abdominal tenderness.   Musculoskeletal:         General: Tenderness (L shoulder) present.   Skin:     General: Skin is warm and dry.   Neurological:      Mental Status: She is alert and oriented to person, place, and time.   Psychiatric:         Mood and Affect: Mood is anxious. Affect is tearful.         Behavior: Behavior normal.             Significant Labs: All pertinent labs within the past 24 hours have been reviewed.  CBC:   Recent Labs   Lab 09/11/24 1938 09/12/24 0914   WBC 17.09* 10.43   HGB 13.9 11.4*   HCT 42.9 35.2*    353     CMP:   Recent Labs   Lab 09/11/24 1938 09/12/24 0914    141   K 3.2* 3.6    107   CO2 20* 24   * 120*   BUN 13 13   CREATININE 1.3 1.2   CALCIUM 9.5 8.5*   PROT 8.5* 6.7   ALBUMIN 3.8 3.0*   BILITOT 0.3 0.4   ALKPHOS 104 85   AST 40 18   ALT 23 16    ANIONGAP 16 10       Significant Imaging: I have reviewed all pertinent imaging results/findings within the past 24 hours.    Assessment/Plan:      * Encephalopathy, toxic  Decreased mentation/AMS in setting of chronic benzo and substance abuse. Last cocaine use yesterday, s/p narcan with EMS. CTH without acute findings.     - afebrile, HDS  - UDS (+) benzos, cocaine, fentanyl  - somnolence/mentation improved  - addiction psych consulted  - neuro checks    Leukocytosis  Resolved, likely stress response.    Polypharmacy  - PDMP with xanax, soha, ambien  - recommend weaning by PCP    Benzodiazepine dependence  - chronic script for xanax 1mg tid per PDMP, recommend weaning given presentation and substance abuse  - hold for now, monitor for withdrawal  - prn atarax    Polysubstance abuse  - hx of cocaine abuse, would benefit from counseling    Essential hypertension  - cont amlodipine    Hypokalemia  Resolved.      VTE Risk Mitigation (From admission, onward)           Ordered     IP VTE LOW RISK PATIENT  Once         09/11/24 2313     Place sequential compression device  Until discontinued         09/11/24 2313                    Discharge Planning   GERONIMO: 9/14/2024     Code Status: Full Code   Is the patient medically ready for discharge?:     Reason for patient still in hospital (select all that apply): Patient trending condition, Laboratory test, Treatment, and Consult recommendations  Discharge Plan A: Home with family                  Bassam Martinez PA-C  Department of Hospital Medicine   [facility] - [facility] Telemetry

## 2024-09-12 NOTE — ASSESSMENT & PLAN NOTE
- chronic script for xanax 1mg tid per PDMP, recommend weaning given presentation and substance abuse  - hold for now, monitor for withdrawal  - prn atarax

## 2024-09-12 NOTE — ED NOTES
"Rosmery Woodard, a 64 y.o. female presents to the ED w/ complaint     Triage note:  Chief Complaint   Patient presents with    Drug Overdose     Pt picked up from residence initially GCS 10 given 4 of narcan IN. Currently GCS 14. Unknown meds taken.     Review of patient's allergies indicates:   Allergen Reactions    Versed [midazolam] Nausea And Vomiting    Amlodipine Other (See Comments)     Patient says that she does not do well with generic amlodipine but does do well with Norvasc (amlodipine).    Bentyl [dicyclomine] Hives    Darvocet a500 [propoxyphene n-acetaminophen] Hives    Toradol [ketorolac] Hives    Zofran [ondansetron hcl (pf)] Nausea And Vomiting    Morphine Nausea And Vomiting     12/7/22 @ 0935 Pt, stated "I take Dilaudid without any reactions to it" pt also states that she "can take oxycodone for pain."      Past Medical History:   Diagnosis Date    Asthma     Hypertension     Liver abscess     hx    Portal vein thrombosis 10/2013    Portal vein thrombosis 10/2013    Ulcerative colitis 9/15/2013     "

## 2024-09-12 NOTE — ED PROVIDER NOTES
"Encounter Date: 2024       History     Chief Complaint   Patient presents with    Drug Overdose     Pt picked up from residence initially GCS 10 given 4 of narcan IN. Currently GCS 14. Unknown meds taken.     64-year-old female with unknown PMH presents with altered mental status.  Patient was unable to provide a comprehensive history because of her altered mental status.    Per EMS:  They were called because the patient was found altered.  They gave an initial GCS of 10 and reported a positive response to intranasal Narcan with subsequent GCS 14.  She was hemodynamically stable otherwise.    The history is provided by the EMS personnel and the patient. The history is limited by the condition of the patient.     Review of patient's allergies indicates:   Allergen Reactions    Versed [midazolam] Nausea And Vomiting    Amlodipine Other (See Comments)     Patient says that she does not do well with generic amlodipine but does do well with Norvasc (amlodipine).    Bentyl [dicyclomine] Hives    Darvocet a500 [propoxyphene n-acetaminophen] Hives    Toradol [ketorolac] Hives    Zofran [ondansetron hcl (pf)] Nausea And Vomiting    Morphine Nausea And Vomiting     22 @ 0935 Pt, stated "I take Dilaudid without any reactions to it" pt also states that she "can take oxycodone for pain."      Past Medical History:   Diagnosis Date    Asthma     Hypertension     Liver abscess     hx    Portal vein thrombosis 10/2013    Portal vein thrombosis 10/2013    Ulcerative colitis 9/15/2013     Past Surgical History:   Procedure Laterality Date    ABDOMINAL SURGERY      3 exploratory surgeries    APPENDECTOMY       SECTION, LOW TRANSVERSE      3 C-sections    CHOLECYSTECTOMY      COLONOSCOPY      COLONOSCOPY N/A 2016    Procedure: COLONOSCOPY;  Surgeon: Brad King MD;  Location: [address];  Service: Endoscopy;  Laterality: N/A;    EXPLORATORY LAPAROTOMY W/ BOWEL RESECTION      HERNIA REPAIR      " HYSTERECTOMY      INCISIONAL HERNIA REPAIR      UPPER GASTROINTESTINAL ENDOSCOPY  2015    URETERAL STENT PLACEMENT Left 2022    Procedure: INSERTION, STENT, URETER;  Surgeon: Mann Geronimo MD;  Location: [facility];  Service: Urology;  Laterality: Left;     Family History   Problem Relation Name Age of Onset    Hypertension Maternal Grandmother      Cirrhosis Maternal Aunt      Breast cancer Maternal Aunt      Colon cancer Maternal Uncle      Breast cancer Cousin      Celiac disease Neg Hx      Colon polyps Neg Hx      Crohn's disease Neg Hx      Cystic fibrosis Neg Hx      Esophageal cancer Neg Hx      Hemochromatosis Neg Hx      Inflammatory bowel disease Neg Hx      Irritable bowel syndrome Neg Hx      Liver cancer Neg Hx      Liver disease Neg Hx      Rectal cancer Neg Hx      Stomach cancer Neg Hx      Ulcerative colitis Neg Hx      Scott's disease Neg Hx       Social History     Tobacco Use    Smoking status: Former     Current packs/day: 0.00     Average packs/day: 0.1 packs/day for 0.5 years     Types: Cigarettes     Start date: 3/16/1976     Quit date: 1976     Years since quittin.0    Smokeless tobacco: Never   Substance Use Topics    Alcohol use: Yes     Alcohol/week: 1.0 standard drink of alcohol     Types: 1 Glasses of wine per week     Comment: rare    Drug use: No     Review of Systems    Physical Exam     Initial Vitals [24 1909]   BP Pulse Resp Temp SpO2   (!) 158/84 90 (!) 28 97.4 °F (36.3 °C) 99 %      MAP       --         Physical Exam    Nursing note and vitals reviewed.  Constitutional: Vital signs are normal. She appears well-developed and well-nourished. She is not diaphoretic. No distress.   HENT:   Head: Normocephalic and atraumatic.   Eyes: Conjunctivae and EOM are normal. Pupils are equal, round, and reactive to light.   Neck: Neck supple.   Normal range of motion.  Cardiovascular:  Normal rate and intact distal pulses.           Pulmonary/Chest: No respiratory  distress. She has no wheezes. She has no rhonchi. She has no rales.   Normal work of breathing and respiratory rate.   Abdominal: Abdomen is soft. She exhibits no distension. There is no abdominal tenderness. There is no rebound and no guarding.   Musculoskeletal:         General: No tenderness or edema.      Cervical back: Normal range of motion and neck supple.     Neurological: GCS eye subscore is 4. GCS verbal subscore is 5. GCS motor subscore is 6.   Patient easily awakes to voice.  Oriented to self, location, and year.  Moving all extremities.   Skin: Skin is warm. Capillary refill takes less than 2 seconds.         ED Course   Procedures  Labs Reviewed   CBC W/ AUTO DIFFERENTIAL - Abnormal       Result Value    WBC 17.09 (*)     RBC 4.96      Hemoglobin 13.9      Hematocrit 42.9      MCV 87      MCH 28.0      MCHC 32.4      RDW 15.4 (*)     Platelets 351      MPV 11.1      Immature Granulocytes 0.5      Gran # (ANC) 11.4 (*)     Immature Grans (Abs) 0.09 (*)     Lymph # 4.5      Mono # 0.6      Eos # 0.5      Baso # 0.06      nRBC 0      Gran % 66.5      Lymph % 26.1      Mono % 3.7 (*)     Eosinophil % 2.8      Basophil % 0.4      Differential Method Automated      Narrative:     Release to patient->Immediate   COMPREHENSIVE METABOLIC PANEL - Abnormal    Sodium 140      Potassium 3.2 (*)     Chloride 104      CO2 20 (*)     Glucose 144 (*)     BUN 13      Creatinine 1.3      Calcium 9.5      Total Protein 8.5 (*)     Albumin 3.8      Total Bilirubin 0.3      Alkaline Phosphatase 104      AST 40      ALT 23      eGFR 45.9 (*)     Anion Gap 16      Narrative:     Release to patient->Immediate   URINALYSIS, REFLEX TO URINE CULTURE - Abnormal    Specimen UA Urine, Clean Catch      Color, UA Colorless (*)     Appearance, UA Clear      pH, UA 7.0      Specific Gravity, UA 1.010      Protein, UA Negative      Glucose, UA 1+ (*)     Ketones, UA Negative      Bilirubin (UA) Negative      Occult Blood UA Negative       Nitrite, UA Negative      Leukocytes, UA 1+ (*)     Narrative:     Specimen Source->Urine   DRUG SCREEN PANEL, URINE EMERGENCY - Abnormal    Benzodiazepines Presumptive Positive (*)     Methadone metabolites Negative      Cocaine (Metab.) Presumptive Positive (*)     Opiate Scrn, Ur Negative      Barbiturate Screen, Ur Negative      Amphetamine Screen, Ur Negative      THC Negative      Phencyclidine Negative      Creatinine, Urine 57.0      Toxicology Information SEE COMMENT      Narrative:     Specimen Source->Urine   ACETAMINOPHEN LEVEL - Abnormal    Acetaminophen (Tylenol), Serum <3.0 (*)     Narrative:     Release to patient->Immediate   URINALYSIS MICROSCOPIC - Abnormal    RBC, UA 1      WBC, UA 2      Bacteria Rare      Squam Epithel, UA 3      Hyaline Casts, UA 9 (*)     Microscopic Comment SEE COMMENT      Narrative:     Specimen Source->Urine   TSH    TSH 2.511      Narrative:     Release to patient->Immediate   ALCOHOL,MEDICAL (ETHANOL)    Alcohol, Serum <10      Narrative:     Release to patient->Immediate   MAGNESIUM    Magnesium 2.0      Narrative:     Release to patient->Immediate   FENTANYL, URINE    Creatinine, Urine 57.0      Narrative:     Specimen Source->Urine   HIV 1 / 2 ANTIBODY   HEPATITIS C ANTIBODY          Imaging Results              CT Head Without Contrast (Final result)  Result time 09/11/24 21:29:20      Final result by Peter Del Rio MD (09/11/24 21:29:20)                   Impression:      No CT evidence of acute intracranial abnormality.    Generalized cerebral volume loss and chronic microvascular ischemic change.      Electronically signed by: Peter Del Rio MD  Date:    09/11/2024  Time:    21:29               Narrative:    EXAMINATION:  CT HEAD WITHOUT CONTRAST    CLINICAL HISTORY:  Mental status change, unknown cause;    TECHNIQUE:  Low dose axial images were obtained through the head.  Coronal and sagittal reformations were also performed. Contrast was not  "administered.    COMPARISON:  07/16/2024.    FINDINGS:  Generalized cerebral volume loss with ex vacuo dilation of the ventricles and sulci.  Patchy and confluent periventricular white matter hypoattenuation suggestive of chronic microvascular ischemic change, though nonspecific.    No evidence of acute territorial infarct, hemorrhage, mass effect, or midline shift.    Ventricles are normal in size and configuration.    No displaced calvarial fracture.    Visualized paranasal sinuses and mastoid air cells are essentially clear.                                       X-Ray Chest AP Portable (Final result)  Result time 09/11/24 21:07:37      Final result by Peter Del Rio MD (09/11/24 21:07:37)                   Impression:      No large focal consolidation identified on this single view.  Follow-up PA and lateral views would provide improved sensitivity if there is strong clinical concern for pneumonia.      Electronically signed by: Peter Del Rio MD  Date:    09/11/2024  Time:    21:07               Narrative:    EXAMINATION:  XR CHEST AP PORTABLE    CLINICAL HISTORY:  Provided history is "AMS and leukocytosis;  ".    TECHNIQUE:  One view of the chest.    COMPARISON:  03/12/2017.    FINDINGS:  Cardiac wires overlie the chest.  Cardiomediastinal silhouette is magnified by portable technique and similar to the prior study.  Atherosclerotic calcifications overlie the aortic arch.  No confluent area of consolidation.  No large pleural effusion.  No pneumothorax.  Follow-up PA and lateral views would provide improved sensitivity for pneumonia if clinically warranted.                                       Medications   sodium chloride 0.9% bolus 1,000 mL 1,000 mL (0 mLs Intravenous Stopped 9/11/24 [id number])     Medical Decision Making  Differential diagnoses considered includes opiate overdose, polysubstance abuse, cocaine use, ICH, electrolyte abnormality    Pt stable upon arrival. See ED course for additional " attending MDM.     Amount and/or Complexity of Data Reviewed  External Data Reviewed: radiology and notes.     Details: Reviewed [place]  and patient was prescribed Ambien and xanax    === Results for orders placed during the hospital encounter of 05/25/23 ===    Echo Saline Bubble? No    - Interpretation Summary -  · The left ventricle is normal in size with concentric remodeling and normal systolic function.  · Normal right ventricular size with normal right ventricular systolic function.  · The estimated ejection fraction is 60%.  · Grade I left ventricular diastolic dysfunction.  · The estimated PA systolic pressure is 18 mmHg.  · Normal central venous pressure (3 mmHg).      Labs: ordered. Decision-making details documented in ED Course.  Radiology: ordered and independent interpretation performed. Decision-making details documented in ED Course.  ECG/medicine tests: ordered and independent interpretation performed. Decision-making details documented in ED Course.    Risk  Decision regarding hospitalization.  Diagnosis or treatment significantly limited by social determinants of health.               ED Course as of 09/11/24 2259   Wed Sep 11, 2024   2039 Acetaminophen Level(!): <3.0 [BD]   2039 Alcohol, Serum: <10 [BD]   2039 Magnesium : 2.0 [BD]   2039 Comprehensive metabolic panel(!)  Mild CHANI and hypokalemia. Will give 1L NS [BD]   2039 WBC(!): 17.09  Unclear etiology [BD]   2054 TSH: 2.511 [BD]   2054 X-Ray Chest AP Portable  Chest x-ray independently interpreted by me shows no acute process such as pneumonia, pneumothorax, or pulmonary edema.    [BD]   2125 CT Head Without Contrast  CT Head reviewed and independently interpreted by me is unremarkable without evidence of large-vessel infarct or intracranial hemorrhage   [BD]   2153 EKG 12-lead  EKG independently interpreted by me shows normal sinus rhythm, rate 87, no STEMI, stable compared to 7/16/24 [BD]   2239 Urinalysis, Reflex to Urine Culture  Urine, Clean Catch(!)  No UTI [BD]   2230 Drug screen panel, emergency(!)  UDS positive for cocaine and benzos [BD]   4287 Patient's workup consistent with benzodiazepine overdose.  She was prescribed Xanax who has a longer half-life so she will need an extended period of observation.    Discussed the case with Hospital Medicine who will place the patient observation.    Procedure: Critical Care  Please put in 60 minutes of critical care due to patient having a high risk of neurological failure.        [BD]      ED Course User Index  [BD] Prem Garcia MD                           Clinical Impression:  Final diagnoses:  [R41.82] AMS (altered mental status)  [T42.4X1A] Benzodiazepine overdose (Primary)          ED Disposition Condition    Observation Stable                Prem Garcia MD  09/11/24 6048

## 2024-09-12 NOTE — HPI
"Rosmery Woodard is a 64 y.o. female with HTN, CKD, anxiety on chronic benzos, neuropathy who presents today with acute encephalopathy.     History provided mainly by ED due to somnolence and patient noncooperation with interview.     She was reportedly brought in by EMS by being found by her boyfriend with decreased mentation. Per EMS, responded to Narcan, but somnolence returned. She tells me that she thinks "anxiety" caused her presentation and that she does cocaine (last use 2 days ago) but otherwise provides little history.     Overall, workup unremarkable. UDS positive for benzos and cocaine. Had mild leukocytosis without evidence of infection. Hospital medicine consulted for likely illicit substance overdose.   "

## 2024-09-12 NOTE — ASSESSMENT & PLAN NOTE
Patient on significant sedating regimen along with illicit substance use, which is likely leading to her presentation. PDMP shows Xanax TID, gabapentin, Ambien. Would highly suggest weaning/medication reconciliation by PCP.

## 2024-09-12 NOTE — ASSESSMENT & PLAN NOTE
Presents with decreased mentation/AMS in the setting of chronic benzo and illicit substance use. CT Head without acute pathology. UDS positive for benzos (prescribed significant amount) and cocaine, which she admits to use. Briefly responded to Narcan per EMS. HD stable, but still with somnolence and responsive to verbal stimulus. Suspect illicit substance/benzo/polypharmacy overdose leading to her current presentation. Will monitor overnight for respiratory depression and hopeful for improvement in mental status. Hold sedating agents as able.

## 2024-09-12 NOTE — CONSULTS
[facility]   DEPARTMENT OF PSYCHIATRY     IDENTIFIERS & DEMOGRAPHICS:     ENCOUNTER: initial    -- PATIENT IDENTIFIERS: Rosmery Woodard  [id number]  1960  64 y.o.  female  -- LOCATION OF PATIENT: unit (med/surg)    -- MODE OF ARRIVAL: ambulance  -- PRESENT WITH PATIENT DURING SESSION: ALONE  -- SOURCES OF INFORMATION: PATIENT, EHR/chart  -- ENCOUNTER PROVIDER: Peter Cat IV, MD        PRESENTATION:     CHIEF COMPLAINT(S): cocaine use    OVERVIEW OF THE HPI:    64F with hx cocaine use, unspecified anxiety and depression presented with GCS 10 and admitted for suspected substance overdose. UDS with benzos (prescribed xanax), fentanyl, and cocaine. Mentation improved, now interested in formal treatment for substance use.     SUBJECTIVE/CURRENT FINDINGS:    Patient reports feeling not so good due to context of admission. States she is sleepy and uncomfortable from presumed fall prior to admission. Notes still unclear on what exactly happened. Appears to minimize cocaine use but does admit to using in the past three days and was awake much of this time. She is motivated to quit out of fear of negative consequences including injury and loss of life. Also notes motivation to stay active in adult children's lives. Reports prior intranasal use at younger age and more recently within the past week. States she was mostly bored and around others that had access to cocaine. Denies any prior formal treatment or use of other substances. Endorses chronic anxiety and history of depression. Feels anxiety is mostly controlled and describes what sounds like benzo withdrawal (increased anxiety, tremor, hallucinations, decreased sleep) when abstaining from xanax for more than a day. Takes xanax BID at present rather than TID as prescribed. States ambien helps with sleep often, but not always, with continued frequent awakenings and some distressing dreams that are not related to past trauma. Patient states she has been  "anhedonic but unable to list things she would like to do. Reports difficulties with transportation at present. Also reports varied appetite. Has been on celexa and lexapro before for >6 weeks and did not like how they made her think or feel. Ultimately reports she is interested in alternate treatment of her anxiety and depressed mood, as well as seeking formal treatment for substance use. Patient unsure of seeking outpatient vs inpatient, but does state she wants to be treated by someone that has been through what she has been through. She is open to all options at present.     Per Chart Review:    PDMP reviewed: xanax, ambien and gabapentin from Dr. Paul filled regularly and appropriately    Per primary: "Rosmery Woodard is a 64 y.o. female with HTN, CKD, anxiety on chronic benzos, neuropathy who presents today with acute encephalopathy.      History provided mainly by ED due to somnolence and patient noncooperation with interview.      She was reportedly brought in by EMS by being found by her boyfriend with decreased mentation. Per EMS, responded to Narcan, but somnolence returned. She tells me that she thinks "anxiety" caused her presentation and that she does cocaine (last use 2 days ago) but otherwise provides little history.      Overall, workup unremarkable. UDS positive for benzos and cocaine. Had mild leukocytosis without evidence of infection. Hospital medicine consulted for likely illicit substance overdose. "      REVIEW OF SYSTEMS:    >> SOURCES: patient     Y   Sleep Disturbance/Disruption  course: chronic  at or near baseline  +insomnia  no decreased need for sleep     Y   Appetite/Weight Change  course: chronic   N   Alterations in Energy Level   N   Impaired Focus/Concentration   Y   Depressive Symptomatology  +depressed mood, +anhedonia  no hopelessness, no tearfulness     Y   Excessive Anxiety/Worry  +generalized anxiety/worry, +keyed up/on edge     N   " "Dysregulated Mood/Behavior   N   Manic Symptomatology   N   Psychosis   N   Abnormal Involuntary Movements    Regarding the current presentation, no other significant issues or complaints are voiced or known at this time.       ADD-ON PSYCHOTHERAPY:     ADD-ON THERAPY     HISTORY:     >> SOURCES: patient, chart review       PSYCH  SUBSTANCE  FAMILY  SOCIAL  MEDICAL     Y   Previous/Pre-Existing Psychiatric Diagnoses  anxiety and depression   Y   Past Psychotropic Trials  Celexa and Lexapro have made her suicidal; says that trazodone "makes me think too much - I don't like the feeling" Seroquel - "it gave me thoughts and I didn't like it"   Y   Current Psychiatric Provider  Dr. Jose Ramon Paul   Y   Hx of Outpatient Psychiatric Treatment   N   Hx of Psychiatric Hospitalization   N   Hx of Suicidal Ideation/Threats   N   Hx of Suicide Attempts/Gestures   N   Hx of Homicidal Ideation/Threats   N   Hx of Homicidal Behavior   N   Hx of Non-Suicidal Self-Injurious Behavior   N   Hx of Perpetrated Violence   N   Documented Hx of Malingering   N   Hx of Psychosis   N   Hx of Bipolar Diathesis   Y   Hx of Depression   Y   Hx of Anxiety   Y   Hx of Insomnia   N   Hx of Delirium     N   Hx of Formal CHEY Treatment   Y   Recent Alcohol Consumption   +   Drinking Pattern (frequency)  +rare   Y   Hx of Nicotine Use  rare   N   Hx of Alcohol Misuse/Abuse   Y   Hx of Illicit Drug Misuse/Abuse  current   +   Readiness for Change (illicits cessation)  contemplation   +   Drug Experimentation/Usage  +cannabis (previously), +cocaine       Y   Family Psychiatric History  depression, anxiety     Y   Hx of Abuse  during childhood     N   Developmental Delay/Disability    GED/High School Diploma  11th grade   N   Post-Secondary Education   Y   Currently on Disability   Y   Functions " "Independently   Y   Domiciled   Y   Lives Alone   Y   Intact Support System   N   Currently in a Relationship   Y   Ever    Y   Ever /   Y   Children/Dependents  5 adult daughters    Hobbies/Recreational Activities  "I like to write, I like music, I like to shop, going on trips, going on trips."     Y   Medical Hx & Diagnoses   Y   Allergies    >> SCHEDULED AND PRN MEDS: reviewed/reconciled  see MEDCARD      Allergies:  Versed [midazolam], Bentyl [dicyclomine], Darvocet a500 [propoxyphene n-acetaminophen], Toradol [ketorolac], and Morphine     EXAMINATION:     VITALS:  BP (!) 135/93 (Patient Position: Lying)   Pulse 76   Temp 98 °F (36.7 °C) (Oral)   Resp 16   Ht 5' 3" (1.6 m)   Wt 77.1 kg (169 lb 15.6 oz)   SpO2 96%   BMI 30.11 kg/m²      MENTAL STATUS EXAMINATION:  Appearance: appropriately dressed, adequately groomed, in no apparent distress    Behavior & Attitude: participative, under adequate behavioral control  calm, agreeable, cooperative    Movements & Motor Activity: no psychomotor agitation, no psychomotor retardation, no tremor    Speech & Language: normal rate, normal volume, normal quantity, spontaneous, reciprocal, fluent    Mood: "not so good"  Affect: euthymic, reactive, full range  appropriate given the situation/context    Thought Process & Associations: linear, goal-directed, organized, logical, coherent    Thought Content & Perceptions: no delusions, no paranoid ideation, no hallucinations    Sensorium: awake, alert, clear  no confusion    Orientation: fully intact    Recent & Remote Memory: intact (recent), intact (remote)    Attention & Concentration: intact  attentive to conversation, not easily distracted    Fund of Knowledge: intact, vocabulary proficient    Insight: intact, partial    minimizes substance use but agreeable to treatment  Judgment: intact, fair    interested in treatment          RISK & " REGULATORY:      RISK PARAMETERS (current to the encounter/episode  NOT inclusive of past history):     N   Suicidal Ideation/Threats   N   Suicide Attempts/Gestures   N   Homicidal Ideation/Threats   N   Homicidal Behavior   N   Non-Suicidal Self-Injurious Behavior   N   Perpetrated Violence     FIREARMS & WEAPONS:     N   Ready Access to Firearms     SAFETY SCREENINGS:    -- PROTECTIVE FACTORS: IDENTIFIED       - SPECIFIC FACTORS IDENTIFIED: accessible support, closely followed, loving attachments, social supports, fear of death, fear of bad outcome, family responsibility    -- RISK FACTORS: IDENTIFIED     - SPECIFIC MODIFIABLE FACTORS IDENTIFIED: intoxication/use, substance abuse    -- CONTRACTS FOR SAFETY: YES  -- FUTURE ORIENTED: YES  -- REMORSE/REGRET: YES    -- CAREGIVER(S)     - SUPPORTIVE & APPROPRIATELY INVOLVED: YES    -- RISK MITIGATION & PREVENTION:      - INTERVENTIONS: advice/counseling, harm reduction     REGULATORY:    -- : REVIEWED      INFORMED CONSENT & SHARED DECISION MAKING are the hallmark and bedrock of good clinical care, and as such have been employed and obtained, respectively, to the degree possible.  Discussed, to the extent possible, diagnosis, risks and benefits of proposed treatment (e.g., medication, therapy) vs alternative treatments vs no treatment, potential side effects of these treatments, and the inherent unpredictability of treatment.  Resources have been provided via the patient instructions in the AVS to supplement, augment, and reinforce discussions, counseling, and/or interventions.       - ABILITY TO UNDERSTAND, PARTICIPATE & ENGAGE: present and intact     - AGREEABLE TO TREATMENT (consent/assent): the patient consents to treatment     - RELIABILITY/ACCURACY: the patient is deemed to be a historian of unknown reliability and accuracy      WARNINGS & PRECAUTIONS:  >> In cases of emergencies (e.g. SI/HI resulting in danger to self or others,  functioning deteriorating to the level of grave disability), call 911 or [phone], or present to the emergency department for immediate assistance.    >> Individuals should not operate a motor vehicle or heavy machinery if effects of medications or underlying symptoms/condition impair the ability to do so safely.    >> FULLY comply with ANY/ALL medication as prescribed/instructed and report ANY/ALL suspected adverse effects to appropriate health care providers.       ASSESSMENT & PLAN:     DIAGNOSES & PROBLEMS:       1.  Cocaine use disorder, moderate  chronic with exacerbation/progression    2.  Unspecifed anxiety  chronic stable    3.  Unspecified depression  chronic stable    PSYCHOTROPIC REGIMEN:   (C)=Continue as prescribed  (A)=Adjust as noted  (I)=Iniitate  (D)=Discontinue      1.  Mirtazapine 15 mg nightly (I)    2.  PRN valium or ativan for withdrawal symptoms (I)    -- ASSESSMENT (synthesis  analysis):     64F with hx anxiety and depression admitted for encephalopathy in setting of substance use. UDS with benzos, cocaine, fentanyl. Patient interactive and appropriate this morning. Notes cocaine use and despite potentially minimizing use, is interested in treatment due to fear of poor outcome/loss of life with continued use. Has not had formal treatment before and is unsure of which setting she would like to seek at this time. Resources in AVS. Recommend initiating mirtazapine for mood and insomnia as she is on nothing for depression and insomnia sounds insufficiently treated with ambien. Would watch for benzo withdrawal with daily xanax use. Anxiety is better treated with alternate options over daily xanax. Active substance use also makes xanax and ambien less appropriate treatments.      - GLOBAL FUNCTIONING: at or near baseline    -- PLAN (goals  recommentations):     -Would initiate mirtazapine 15 mg nightly for mood and sleep  -Monitor closely for withdrawal from xanax and consider PRN diazepam or  lorazepam while admitted  -Do not recommend management of anxiety with benzos alone as well as with active substance use       With reasonable medical certainty, based on history, chart review, available collateral information, and a present-state examination:  - adjustments to psychotropic regimen as noted herin  - defer non-psychiatric medication(s) and management to the current provider(s) of record  - recommend patient enroll in addiction rehab program after discharge and medical stabilization  - counseled on full abstinence from alcohol and substances of abuse (illicit and prescription)  - full engagement in 12 step (or equivalent) recovery program(s), including meeting attendance and acquisition/maintenance of sponsor  - relapse prevention and motivational interviewing provided  - provided resources for various addiction rehabilitation options as part of aftercare planning     Thank you for this consult.       MEDICAL DECISION MAKING:    - DIAGNOSTICS: a diagnostic psychiatric evaluation was performed and responsiveness to treatment was assessed  ability/capacity to respond to treatment: adequate/intact    CHART REVIEW: available documentation has been reviewed, and pertinent elements of the chart have been incorporated into this evaluation where appropriate.       DIAGNOSTIC TESTING:      Glu 120 (H)  9/12/2024  Li *   *  TSH 2.511  9/11/2024    HgA1c 5.6  5/26/2023  VPA *   *   FT4 *   *    Na 141  9/12/2024  CLZ *   *  WBC 10.43  9/12/2024    Cr 1.2  9/12/2024  ANC 6.7; 64.2;   9/12/2024   Hgb 11.4 (L)  9/12/2024     BUN 13  9/12/2024  Trop I <0.006  7/16/2024  HCT 35.2 (L)  9/12/2024     GFR 50.5 (A)  9/12/2024   CPK 65  7/16/2024    9/12/2024     Alb 3.0 (L)  9/12/2024   PRL *   *  B12 *   *     T Bili 0.4  9/12/2024  Chol 264 (H)  7/11/2024  B9 *   *    ALP 85  9/12/2024  TGs 211 (H)  7/11/2024  B1 *   *    AST 18  9/12/2024  HDL 48 (L)  7/11/2024  Vit D *   *      ALT 16  9/12/2024   (H)  7/11/2024  HIV Negative  7/16/2024     INR 1.4 (H)  5/25/2023  Rebecca *   *   Hep C Negative  7/16/2024    GGT *   *  Lip 30  1/31/2024  RPR *   *    MCV 88  9/12/2024   NH4 *   *  UPT *   *      PETH *   *  THC Negative  9/11/2024    ETOH <10  9/11/2024  KYM Presumptive Positive (A)  9/11/2024    EtG *   *  AMP Negative  9/11/2024    ALC *   *  OPI Negative  9/11/2024    BZO Presumptive Positive (A)  9/11/2024  MTD Negative  9/11/2024     BAR Negative  9/11/2024  BUP *   *    PCP Negative  9/11/2024  FEN Presumptive Positive (A)  9/11/2024     Results for orders placed or performed during the hospital encounter of 07/16/24   EKG 12-lead    Collection Time: 07/16/24  2:33 PM   Result Value Ref Range    QRS Duration 92 ms    [facility] QTC Calculation 469 ms    Narrative    Test Reason : R07.9,    Vent. Rate : 066 BPM     Atrial Rate : 066 BPM     P-R Int : 184 ms          QRS Dur : 092 ms      QT Int : 448 ms       P-R-T Axes : 069 -09 036 degrees     QTc Int : 469 ms    Normal sinus rhythm  Cannot rule out Anterior infarct ,age undetermined  Abnormal ECG    Confirmed by Portia DURON, Keith WELCH ([id number]) on 7/16/2024 6:45:37 PM    Referred By: [email or web address]   SELF           Confirmed By:Keith Pearce MD        ARMIJO & LINKS:        Y  = yes/endorses     N  = no/denies     U  = unknown/unable to assess    ADHD   AIMS   AUDIT   AUDIT-C   C-SSRS (Screen)   C-SSRS (Short)   C-SSRS (Full)   DAST   DAST-10   DRE-7   MoCA   PCL-5   PHQ-9   CHEY   YMRS     Inpatient consult to Psychiatry  Consult performed by: Peter Cat IV, MD  Consult ordered by: Bassam Martinez, PABROOK      [facility] INTERNAL MEDICINE TEL*

## 2024-09-12 NOTE — ASSESSMENT & PLAN NOTE
PDMP notes chronic prescription for Xanax 1mg TID. Especially given illicit substance use and presentation for toxic encephalopathy, would recommend weaning off of this.  Hold for now given somnolence, and monitor for signs of withdrawal.

## 2024-09-12 NOTE — PLAN OF CARE
Problem: Adult Inpatient Plan of Care  Goal: Absence of Hospital-Acquired Illness or Injury  Outcome: Progressing  Intervention: Identify and Manage Fall Risk  Flowsheets (Taken 9/12/2024 1816)  Safety Promotion/Fall Prevention:   assistive device/personal item within reach   commode/urinal/bedpan at bedside   diversional activities provided   Fall Risk reviewed with patient/family   Fall Risk signage in place   family expresses understanding of fall risk and prevention   family to remain at bedside   high risk medications identified   in recliner, wheels locked   instructed to call staff for mobility   lighting adjusted   medications reviewed   muscle strengthening facilitated   nonskid shoes/socks when out of bed   patient expresses understanding of fall risk and prevention   room near unit station   side rails raised x 2  Intervention: Prevent Skin Injury  Flowsheets (Taken 9/12/2024 1816)  Body Position: position changed independently  Skin Protection: incontinence pads utilized  Device Skin Pressure Protection:   absorbent pad utilized/changed   adhesive use limited  Intervention: Prevent and Manage VTE (Venous Thromboembolism) Risk  Flowsheets (Taken 9/12/2024 1816)  VTE Prevention/Management:   ambulation promoted   bleeding precautions maintained   bleeding risk assessed   bleeding risk factor(s) identified, provider notified   dorsiflexion/plantar flexion performed   fluids promoted   ROM (active) performed  Intervention: Prevent Infection  Flowsheets (Taken 9/12/2024 1816)  Infection Prevention:   cohorting utilized   environmental surveillance performed   rest/sleep promoted   single patient room provided   equipment surfaces disinfected   hand hygiene promoted  Goal: Optimal Comfort and Wellbeing  Outcome: Progressing  Intervention: Monitor Pain and Promote Comfort  Flowsheets (Taken 9/12/2024 1816)  Pain Management Interventions:   around-the-clock dosing utilized   awakened for pain meds per patient  request   care clustered   cold applied   heat applied   diversional activity provided   medicated cooling pads   medication offered   pain management plan reviewed with patient/caregiver   pillow support provided   position adjusted   relaxation techniques promoted   quiet environment facilitated   premedicated for activity   prescribed exercises encouraged   warm blanket provided  Intervention: Provide Person-Centered Care  Flowsheets (Taken 9/12/2024 1816)  Trust Relationship/Rapport:   care explained   questions encouraged   choices provided   reassurance provided   emotional support provided   thoughts/feelings acknowledged   empathic listening provided   questions answered

## 2024-09-12 NOTE — H&P
"[facility] - Emergency Dept  [facility]  History & Physical    Patient Name: Rosmery Woodard  MRN: [id number]  Patient Class: OP- Observation  Admission Date: 2024  Attending Physician: Tyson Torres MD   Primary Care Provider: Yuridia, Primary Doctor         Patient information was obtained from patient and ER records.     Subjective:     Principal Problem:Encephalopathy, toxic    Chief Complaint:   Chief Complaint   Patient presents with    Drug Overdose     Pt picked up from residence initially GCS 10 given 4 of narcan IN. Currently GCS 14. Unknown meds taken.        HPI: Rosmery Woodard is a 64 y.o. female with HTN, CKD, anxiety on chronic benzos, neuropathy who presents today with acute encephalopathy.     History provided mainly by ED due to somnolence and patient noncooperation with interview.     She was reportedly brought in by EMS by being found by her boyfriend with decreased mentation. Per EMS, responded to Narcan, but somnolence returned. She tells me that she thinks "anxiety" caused her presentation and that she does cocaine (last use 2 days ago) but otherwise provides little history.     Overall, workup unremarkable. UDS positive for benzos and cocaine. Had mild leukocytosis without evidence of infection. Hospital medicine consulted for likely illicit substance overdose.     Past Medical History:   Diagnosis Date    Asthma     Hypertension     Liver abscess     hx    Portal vein thrombosis 10/2013    Portal vein thrombosis 10/2013    Ulcerative colitis 9/15/2013       Past Surgical History:   Procedure Laterality Date    ABDOMINAL SURGERY      3 exploratory surgeries    APPENDECTOMY       SECTION, LOW TRANSVERSE      3 C-sections    CHOLECYSTECTOMY      COLONOSCOPY      COLONOSCOPY N/A 2016    Procedure: COLONOSCOPY;  Surgeon: Brad King MD;  Location: [address];  Service: Endoscopy;  Laterality: N/A;    EXPLORATORY LAPAROTOMY W/ BOWEL RESECTION      HERNIA " "REPAIR      HYSTERECTOMY      INCISIONAL HERNIA REPAIR  2014    UPPER GASTROINTESTINAL ENDOSCOPY  2015    URETERAL STENT PLACEMENT Left 12/7/2022    Procedure: INSERTION, STENT, URETER;  Surgeon: Mann Geronimo MD;  Location: [facility];  Service: Urology;  Laterality: Left;       Review of patient's allergies indicates:   Allergen Reactions    Versed [midazolam] Nausea And Vomiting    Bentyl [dicyclomine] Hives    Darvocet a500 [propoxyphene n-acetaminophen] Hives    Toradol [ketorolac] Hives    Morphine Nausea And Vomiting     12/7/22 @ 0935 Pt, stated "I take Dilaudid without any reactions to it" pt also states that she "can take oxycodone for pain."        No current facility-administered medications on file prior to encounter.     Current Outpatient Medications on File Prior to Encounter   Medication Sig    ALPRAZolam (XANAX) 1 MG tablet Take 1 mg by mouth 3 (three) times daily as needed for Anxiety.    amLODIPine (NORVASC) 10 MG tablet Take 10 mg by mouth once daily.    atorvastatin (LIPITOR) 10 MG tablet Take 10 mg by mouth once daily.    diphenhydrAMINE (BENADRYL) 25 mg capsule Take 25 mg by mouth every 6 (six) hours as needed for Itching.    diphenoxylate-atropine 2.5-0.025 mg (LOMOTIL) 2.5-0.025 mg per tablet Take 1 tablet by mouth 4 (four) times daily as needed for Diarrhea.    gabapentin (NEURONTIN) 100 MG capsule 200 mg every evening.    multivitamin (THERAGRAN) per tablet Take 1 tablet by mouth once daily.     omeprazole (PRILOSEC) 20 MG capsule Take 20 mg by mouth 2 (two) times daily.     oxybutynin (DITROPAN) 5 MG Tab Take 1 tablet (5 mg total) by mouth 3 (three) times daily as needed (Bladder spasms).    oxyCODONE-acetaminophen (PERCOCET) 5-325 mg per tablet Take 1 tablet by mouth every 6 (six) hours as needed for Pain.    promethazine (PHENERGAN) 25 MG tablet Take 1 tablet (25 mg total) by mouth every 8 (eight) hours as needed for Nausea.    promethazine (PHENERGAN) 25 MG tablet Take 1 tablet (25 " mg total) by mouth every 6 (six) hours as needed for Nausea.    tamsulosin (FLOMAX) 0.4 mg Cap Take 1 capsule (0.4 mg total) by mouth every evening. for 7 days    zolpidem (AMBIEN) 10 mg Tab Take 10 mg by mouth every evening.     Family History       Problem Relation (Age of Onset)    Breast cancer Maternal Aunt, Cousin    Cirrhosis Maternal Aunt    Colon cancer Maternal Uncle    Hypertension Maternal Grandmother          Tobacco Use    Smoking status: Former     Current packs/day: 0.00     Average packs/day: 0.1 packs/day for 0.5 years     Types: Cigarettes     Start date: 3/16/1976     Quit date: 1976     Years since quittin.0    Smokeless tobacco: Never   Substance and Sexual Activity    Alcohol use: Yes     Alcohol/week: 1.0 standard drink of alcohol     Types: 1 Glasses of wine per week     Comment: rare    Drug use: No    Sexual activity: Never     Birth control/protection: Abstinence     Review of Systems   Reason unable to perform ROS: somnolence.     Objective:     Vital Signs (Most Recent):  Temp: 97.9 °F (36.6 °C) (24 234)  Pulse: 88 (24)  Resp: 14 (24)  BP: (!) 144/85 (24 2332)  SpO2: 98 % (24) Vital Signs (24h Range):  Temp:  [96.6 °F (35.9 °C)-97.9 °F (36.6 °C)] 97.9 °F (36.6 °C)  Pulse:  [78-90] 88  Resp:  [12-28] 14  SpO2:  [98 %-100 %] 98 %  BP: (134-158)/(82-91) 144/85     Weight: 72.6 kg (160 lb)  Body mass index is 28.34 kg/m².     Physical Exam  Vitals and nursing note reviewed.   Constitutional:       General: She is not in acute distress.     Appearance: She is well-developed. She is obese. She is not diaphoretic.   HENT:      Head: Normocephalic and atraumatic.   Eyes:      General: No scleral icterus.     Conjunctiva/sclera: Conjunctivae normal.   Neck:      Vascular: No JVD.   Cardiovascular:      Rate and Rhythm: Normal rate and regular rhythm.   Pulmonary:      Effort: Pulmonary effort is normal. No respiratory distress.       Comments: Upper airway noises while sleeping, sats % on room air   Musculoskeletal:      Right lower leg: No edema.      Left lower leg: No edema.   Skin:     Coloration: Skin is not jaundiced or pale.   Neurological:      Motor: No abnormal muscle tone.      Comments: Somnolent but arouses to voice, does not sustain wakefulness during interview without stimulation, slow but non-slurred speech, oriented to at least person and place   Psychiatric:         Mood and Affect: Mood normal.         Behavior: Behavior normal.                Significant Labs: All pertinent labs within the past 24 hours have been reviewed.  CBC:   Recent Labs   Lab 09/11/24 1938   WBC 17.09*   HGB 13.9   HCT 42.9        CMP:   Recent Labs   Lab 09/11/24 1938      K 3.2*      CO2 20*   *   BUN 13   CREATININE 1.3   CALCIUM 9.5   PROT 8.5*   ALBUMIN 3.8   BILITOT 0.3   ALKPHOS 104   AST 40   ALT 23   ANIONGAP 16       Significant Imaging: I have reviewed all pertinent imaging results/findings within the past 24 hours.  Assessment/Plan:     * Encephalopathy, toxic  Presents with decreased mentation/AMS in the setting of chronic benzo and illicit substance use. CT Head without acute pathology. UDS positive for benzos (prescribed significant amount) and cocaine, which she admits to use. Briefly responded to Narcan per EMS. HD stable, but still with somnolence and responsive to verbal stimulus. Suspect illicit substance/benzo/polypharmacy overdose leading to her current presentation. Will monitor overnight for respiratory depression and hopeful for improvement in mental status. Hold sedating agents as able.       Leukocytosis  Leukocytosis noted, without evidence of infection or sepsis. Likely stress response from overdose/Narcan. Antibiotics not indicated.       Polypharmacy  Patient on significant sedating regimen along with illicit substance use, which is likely leading to her presentation. PDMP shows Xanax TID,  gabapentin, Ambien. Would highly suggest weaning/medication reconciliation by PCP.       Benzodiazepine dependence  PDMP notes chronic prescription for Xanax 1mg TID. Especially given illicit substance use and presentation for toxic encephalopathy, would recommend weaning off of this.  Hold for now given somnolence, and monitor for signs of withdrawal.       Polysubstance abuse  Patient endorses cocaine abuse, and had items suspicious for paraphernalia on her person noted in the ED. Would benefit from cessation counseling once mental status improves.       Essential hypertension  Continue home Amlodipine.     Hypokalemia  Mild hypokalemia to 3.2, with Mg 2.0. Will supplement PO.       VTE Risk Mitigation (From admission, onward)           Ordered     IP VTE LOW RISK PATIENT  Once         09/11/24 2313     Place sequential compression device  Until discontinued         09/11/24 2313                    On 09/12/2024, patient should be placed in hospital observation services under my care.    Advance Care Planning    Patient is FULL CODE per prior documentation, as they are unable to participate in discussion due to mental status.            Boone Duncan MD  Department of Hospital Medicine  [facility] - Emergency Dept

## 2024-09-12 NOTE — ED NOTES
Tele box [id number] applied to pt. Tech in war room states able to see pt on monitor, rhythm NSR with HR 80.

## 2024-09-12 NOTE — HOSPITAL COURSE
Rosmery Woodard is admitted to  for management of toxic encephalopathy 2/2 recreational drug us. GCS improved s/p narcan while en route with EMS. CXR w/out large consolidation. CTH and L shoulder XR reviewed. Leukocytosis improved. UDS positive for benzos, cocaine, fentanyl. Lytes replaced. Addiction Psych consulted, recommendations for remeron nightly, rehab information provided. No signs of withdrawal. Patient symptomatically improved, pain controlled.    At discharge she will hold soha and ambien until Psychiatry follow up. Prescriptions provided for remeron, lidocaine patches, and robaxin. Referrals to Internal Medicine, Psychiatry, and Orthopedics placed. Return precautions provided. Patient medically ready for discharge. Plan of care discussed with patient, patient agreeable to plan, and all questions answered.      Physical Exam    General: NAD. Obese.  Neuro: Alert and oriented. No focal deficits.  HEENT: EOMI. No JVD appreciated.  CVS: RRR.  Respiratory: Normal respiratory effort.    Abdominal: NTND, soft to palpation.    Extremities: Pulses full, equal, and regular over all 4 extremities.

## 2024-09-12 NOTE — ASSESSMENT & PLAN NOTE
Decreased mentation/AMS in setting of chronic benzo and substance abuse. Last cocaine use yesterday, s/p narcan with EMS. CTH without acute findings.     - afebrile, HDS  - UDS (+) benzos, cocaine, fentanyl  - somnolence/mentation improved  - addiction psych consulted  - neuro checks

## 2024-09-12 NOTE — PATIENT INSTRUCTIONS
REFERRAL RECOMMENDATIONS FOR ALCOHOL USE DISORDER      12 STEP PROGRAMS (and similar):     Alcoholics Anonymous (local)  [x] [phone]  [x] www.[place].org for schedules for in-person and online meetings  [x] There are AA meetings throughout the day all over town  [x] AA costs nothing to attend; they pass a basket for donations but this is not required    Alcoholics Anonymous Online Intergroup (national)  [x] www.aa-intergroup.org  [x] Good resource for large, nation-wide meetings  [x] Can also attend smaller, local meetings in other cities  [x] Countless meetings all day and all night  [x] AA costs nothing to attend; they pass a basket for donations but this is not required    Flying Sober - 24/7 zoom meetings for women and coed - sign on anytime, anywhere!  [email or web address]-[email or web address]    Online Intergroup of AA - 121 Open AA [place] Meeting - 24/7 zoom meetings  https://aa-intergroup.org/meetings/    LOOKING FOR AN ALTERNATIVE TO 12 STEP PROGRAMS - check out:  SMART Recovery: https://www.smartrecovery.org/about-us  [facility] Recovery: https://recoverydharma.org      DETOX UNITS (USUALLY 5-7 DAYS):     [facility]: [address], [place]  [phone], call first to ensure bed availability    [facility]: [address] [place]  [phone], Option 1, call first to ensure bed availability    [place] [facility]: [facility] [address], [place]  [phone] (intake by appointment only)    [facility]: [address], [place]  [phone]      INTENSIVE OUTPATIENT PROGRAMS:     [facility] RECOVERY PROGRAM (formerly known as the ABU)  [x] [phone], Option 2  [x] [address], [address] 4th Floor, [place] [address]  [x] https://www.ochsner.org/services/[email or web address]-recovery-program  [x] [facility] delivers comprehensive and collaborative treatment for alcohol and substance use disorders.  Excellent program for working professionals or  anyone else seeking recovery.  [x] Requires insurance approval prior to starting program, call number above for more information.  [x] Intensive Outpatient Rehabilitation Program - M-F 9am-3pm - daily groups with psychologists and social workers, sessions with MDs 3x per week   [x] Ambulatory detox and dual diagnosis available    [facility] Intensive Outpatient Program  [x] [phone]  [x] [address] (the clinic not [facility])  [x] Call number above for more info and to check insurance requirements    [facility]  [address] [place], [place] [address] [phone]    [facility]:  [address], [address] [place], [place] [address]?, [phone]  [address] [facility]?, [place], [place] [address]?, [phone]    RESIDENTIAL REHABS (USUALLY 28 DAYS):     [facility]: [address], [phone]    [facility]: [address] , [facility]  [phone] (intake by appointment only)    [facility] (men only) [address] [facility] [facility], [phone]    [facility] (Female only) [address] [facility], [phone]    [facility]: [address], [facility], men's program [phone], women's program [phone]    [facility]: [address], [facility], [phone]    [facility]: [address] [place], [place], [phone]    [facility]: Men only, [phone], [address] [place] [facility]: [address], [place], [place], [phone]    [facility]: [facility] [place], [place],  [phone]  New Location: [address] [place] [address] [address], [place] [address], [phone]    [facility]:   ?[address]?[place], [place] [address]?[phone]    Zach: [address], [place] [address], [phone]    Bovina: [place], [phone]     [facility]: [place], [place], [phone]    [address]: Brian  [place]AR[place]and, [phone]    [facility]: [place], [place], [phone]    [place]: [place], [place], [phone]    Barbie [place]: [address], [place] [address], [phone]    COMMUNITY ADDICTION CLINICS:     [facility]: [address] [facility] [place], [phone] -or- [address] [place] [address]    [facility] [place]: [address], [place]  [address]     [facility]: Clinics [phone]; Crisis [phone]    [facility]: [address] [place] [address]    [facility]: [address] [place] [address]    [facility]: [address] [place] [address],    [facility]: 2nd Floor [address] [place] [address]    [facility]: [address] [place] [address]    [facility], [address] [place] [address]    [facility]: [address] [place] [address], [place] [phone]  (serves youth 16-23 years old)    [facility]: [facility] [phone]    [facility]: [address] [facility], [place] [phone]    [facility]: [address], [place] [phone]    [facility]: [address] [facility], [address], [phone]     [facility]: [address] [facility], [phone], [phone]    RESOURCES IN OTHER [facility]:     [facility]: [address] [place], [phone], [phone]    [facility]: [address] [facility], [address], [phone]    [facility], [address] [place] [address], [phone]    [facility]: [address], [place], [phone]    [facility]  "[facility], [address] [facility], [phone] ([facility])    [facility], [address] [facility], [phone] ([facility])    [facility], [address], [place], [phone] ([place], [place])    [facility], [address], [place], [phone] ([facility])    [facility] Hotline [phone], [phone]    [facility], [address] [facility], [facility], [address] [facility], [address], [facility], [address] [facility], [facility]al [facility], [address] [facility] [address]., [facility], [address], [facility], [address], [address] [phone]    [facility], [address] [facility] [phone]    [facility], [address] [facility], [phone]    [facility] for Addiction Recovery, [address] [place] [address], [phone]      [place] SPEAKING (en español):     Información de la reunión de Alcohólicos Anónimos  [facility], 10:00 am  Habla español  Esta reunión está abierta y cualquiera puede asistir.    Kinyarwanda speaking Alcoholics anonymous meetings:  El "Mynor [place] AA Skype" es un mynor on line de Alcohólicos Anónimos en español. El mynor es niyah, gratuito y virtual a través de Skype Audio. El mynor funciona mediante abdullahi llamada grupal de voz, por lo que no se utiliza la videollamada, ni se pueden sacha las imágenes o rostros de los participantes. Hace grace años y medio abrimos el primer Mynor de AA por [place] en [place], esdras actualmente asisten personas desde [place], [place], [place], [place], [place], [place], [place], [place], [place], [place], " [place] y [place], entre otros.    El jose guadalupe es muy útil para los alcohólicos que residen en lugares donde no se celebran reuniones de AA, o residen en lugares donde las reuniones de AA son un número limitado de días a la semana, o para aquellos compañeros que se hayan de viaje o que, por cualquier motivo, se hayan convalecientes y no pueden desplazarse. Todos los días nos reuniones a las 21:00 (hora española)    Podéis obtener más información sobre el jose guadalupe y maine sesiones en la págWildfang web [email or web address] [email or web address]      MENTAL HEALTH/ADDICTIVE DISORDERS:     AA ([phone]), NA ([phone])   National Suicide Prevention Lifeline- Call 1-735.272.8789 Available 24 hours [facility] [phone]; Crisis Line [phone] - Call for options A-F:  Intensive Outpatient Treatment/ Day programs   [facility], please contact   [facility], please contact [phone] or [phone] to speak with an admissions counselor.  [facility] Group (Methadone Maintenance)   [address] [place] [address], [phone]  [facility] [place] [address] [phone]  [facility], [address] [address], [phone]  [facility] [phone]  [facility] [place] please contact [phone]  [facility], [facility] [facility], 4th floor [place] [address] Phone: [phone]   [facility]cer  Tam Office: [address] [address], [phone]  [place] Office: [address] [facility] [address], [place] [address], [phone]  [place] Office: [address] [facility] [place] [address] [phone]    Outpatient Substance Abuse Treatment   Behavioral Health Group (Methadone Maintenance)   [facility] [place] [address], [phone]  [address] [place] [address] [phone]  [facility], [address] [address], [phone]  Acer  [place] Office: [address] [place] [address], [phone]  [place] Office: [address] [facility], [address], [place] [address], [phone]  [place] Office: [address] [place] [address] [phone]  [facility], [address] [place] [address] [phone]   [facility], [address] [facility], [address], [phone]  [facility], [address] [place], [place] [phone]    Residential Substance Abuse Treatment   [facility] [address] [facility], [phone]  [facility], [address] [facility], [phone]  [facility] (men only) [address] [place] [address], [phone]  [facility] (women only) [address] [place] [address] [phone]  [facility], [address] [place] [address] [phone]  [facility] (women only), intakes at [address] [facility], [phone]  [facility] (7-day program, $100, [address], [phone], [phone], [phone])  [facility] (Men only, [phone]), [address], Rudy (Vets*/Non-Vets)  [facility] (Men only, $400/month program fee) [address] [facility] [place] [facility] Janey Concepcion, [phone]  [facility] (Women over age 39 only), [address] [facility], [phone]    Out of Area:    [facility], [address] [place] [address] 36, [place], [place] ([phone])  [facility] (men only), [address] [place] [address], [phone]  [facility], [address] [place], [place] ([phone])  [address] [place], [place] ([phone])  [facility], [address] [place] [address], [phone]  [facility] (Women only, has to have mental illness, can be homeless or substance abuser), [phone]      [place] RESOURCES:     [place] [facility] Crisis Response  Team:    Region 12 ([place], [place], [place], and [facility]) ([facility])  [phone]      Outpatient Mental Health & Addiction Clinic Resources for both [facility]:    [facility] Mental Healthcare Resources  Website: www.[facility].org  Main Number: 652-858-1369    [facility] ([facility])  [address] ([address] [facility]) [place] [address]  [phone]    [facility])  [address] ([address] [facility]) [place] [address]  [phone]    [facility]  [address] ([address]) [place] [address]  [phone]    [facility]  [address] ([address] [facility]) [place] [address]  [phone]      Addiction Treatment Resources for both [facility]:    [facility] (Detox, Residential, PHP, IOP, and Aftercare Programs)  [address] [place], [place] [address] [phone]    [facility] (Residential, IOP, Transitional Living, and Aftercare Programs)  [address] [facility] [place] [address]  [phone]    [facility] (Inpatient, Residential, Detox, IOP, Outpatient, and Aftercare Programs)  [address] [place] [address] [phone] or toll free at [phone]      Outpatient Mental Health Psychotherapy Resources for both [facility]:    Maribeth Agustin, KYW  [address]  [place] [address]  [phone]  Specialties: Depression, Anxiety, and Life Transitions    Juana Burks, PhD  [address]  [place] [address]  [phone]  Specialties: Testing and Evaluation, Education and Learning Disabilities, and ADHD    Marianna Ramsay, KYW [facility] [address] [place] [address]  [phone]  Specialties:  Obsessive-Compulsive (OCD), Depression, and Relationship Issues    Savana Menon LPC [address] [place] [address]  [phone]  Specialties: Trauma & PTSD, Mood Disorders, and Anxiety    Savana Sykes, PhD, [facility] [address] [place] [address], [place] [address] [phone]  Specialties: Family Conflict, Child, and Relationship Issues    Kathryn Pickett LPC Counseling [place], [place] [address] [phone]  Specialties: Anxiety, Depression, and Anger Management        IN CASE OF SUICIDAL THINKING, call the National Suicide Hotline Number: 988    988 Suicide & Crisis Lifeline: 988 , 4-5113-886-031-TALK (0841)  Provides 24/7, free and confidential support for people in distress, prevention and crisis resources for you or your loved ones, and best practices for professionals.    Call, text or chat.  https://988INFRARED IMAGING SYSTEMSline.org

## 2024-09-12 NOTE — ASSESSMENT & PLAN NOTE
Patient endorses cocaine abuse, and had items suspicious for paraphernalia on her person noted in the ED. Would benefit from cessation counseling once mental status improves.

## 2024-09-12 NOTE — SUBJECTIVE & OBJECTIVE
Interval History: AF, HDS. Tearful affect, reports L shoulder and L chest wall tenderness, remorseful for drug use. Lytes improved. Pain control. F/u addiction psych.    Review of Systems   Constitutional:  Negative for chills and fever.   Respiratory:  Negative for chest tightness and shortness of breath.    Cardiovascular:  Negative for chest pain and leg swelling.   Gastrointestinal:  Negative for abdominal pain and nausea.   Neurological:  Negative for dizziness and weakness.   Psychiatric/Behavioral:  The patient is nervous/anxious.      Objective:     Vital Signs (Most Recent):  Temp: 98 °F (36.7 °C) (09/12/24 1106)  Pulse: 76 (09/12/24 1106)  Resp: 16 (09/12/24 1106)  BP: (!) 135/93 (09/12/24 1106)  SpO2: 96 % (09/12/24 1106) Vital Signs (24h Range):  Temp:  [96.6 °F (35.9 °C)-98.4 °F (36.9 °C)] 98 °F (36.7 °C)  Pulse:  [75-90] 76  Resp:  [12-28] 16  SpO2:  [94 %-100 %] 96 %  BP: (111-158)/(74-93) 135/93     Weight: 77.1 kg (169 lb 15.6 oz)  Body mass index is 30.11 kg/m².    Intake/Output Summary (Last 24 hours) at 9/12/2024 1210  Last data filed at 9/12/2024 0827  Gross per 24 hour   Intake 1720 ml   Output 500 ml   Net 1220 ml         Physical Exam  Vitals and nursing note reviewed.   Constitutional:       Appearance: She is well-developed. She is obese.   Eyes:      Pupils: Pupils are equal, round, and reactive to light.   Cardiovascular:      Rate and Rhythm: Normal rate and regular rhythm.   Pulmonary:      Effort: Pulmonary effort is normal.      Breath sounds: Normal breath sounds.   Chest:      Chest wall: Tenderness present.   Abdominal:      Palpations: Abdomen is soft.      Tenderness: There is no abdominal tenderness.   Musculoskeletal:         General: Tenderness (L shoulder) present.   Skin:     General: Skin is warm and dry.   Neurological:      Mental Status: She is alert and oriented to person, place, and time.   Psychiatric:         Mood and Affect: Mood is anxious. Affect is tearful.          Behavior: Behavior normal.             Significant Labs: All pertinent labs within the past 24 hours have been reviewed.  CBC:   Recent Labs   Lab 09/11/24 1938 09/12/24 0914   WBC 17.09* 10.43   HGB 13.9 11.4*   HCT 42.9 35.2*    353     CMP:   Recent Labs   Lab 09/11/24 1938 09/12/24 0914    141   K 3.2* 3.6    107   CO2 20* 24   * 120*   BUN 13 13   CREATININE 1.3 1.2   CALCIUM 9.5 8.5*   PROT 8.5* 6.7   ALBUMIN 3.8 3.0*   BILITOT 0.3 0.4   ALKPHOS 104 85   AST 40 18   ALT 23 16   ANIONGAP 16 10       Significant Imaging: I have reviewed all pertinent imaging results/findings within the past 24 hours.

## 2024-09-12 NOTE — SUBJECTIVE & OBJECTIVE
"Past Medical History:   Diagnosis Date    Asthma     Hypertension     Liver abscess     hx    Portal vein thrombosis 10/2013    Portal vein thrombosis 10/2013    Ulcerative colitis 9/15/2013       Past Surgical History:   Procedure Laterality Date    ABDOMINAL SURGERY      3 exploratory surgeries    APPENDECTOMY       SECTION, LOW TRANSVERSE      3 C-sections    CHOLECYSTECTOMY      COLONOSCOPY      COLONOSCOPY N/A 2016    Procedure: COLONOSCOPY;  Surgeon: Brad King MD;  Location: [address]);  Service: Endoscopy;  Laterality: N/A;    EXPLORATORY LAPAROTOMY W/ BOWEL RESECTION      HERNIA REPAIR      HYSTERECTOMY      INCISIONAL HERNIA REPAIR      UPPER GASTROINTESTINAL ENDOSCOPY      URETERAL STENT PLACEMENT Left 2022    Procedure: INSERTION, STENT, URETER;  Surgeon: Mann Geronimo MD;  Location: [facility];  Service: Urology;  Laterality: Left;       Review of patient's allergies indicates:   Allergen Reactions    Versed [midazolam] Nausea And Vomiting    Bentyl [dicyclomine] Hives    Darvocet a500 [propoxyphene n-acetaminophen] Hives    Toradol [ketorolac] Hives    Morphine Nausea And Vomiting     22 @ 0935 Pt, stated "I take Dilaudid without any reactions to it" pt also states that she "can take oxycodone for pain."        No current facility-administered medications on file prior to encounter.     Current Outpatient Medications on File Prior to Encounter   Medication Sig    ALPRAZolam (XANAX) 1 MG tablet Take 1 mg by mouth 3 (three) times daily as needed for Anxiety.    amLODIPine (NORVASC) 10 MG tablet Take 10 mg by mouth once daily.    atorvastatin (LIPITOR) 10 MG tablet Take 10 mg by mouth once daily.    diphenhydrAMINE (BENADRYL) 25 mg capsule Take 25 mg by mouth every 6 (six) hours as needed for Itching.    diphenoxylate-atropine 2.5-0.025 mg (LOMOTIL) 2.5-0.025 mg per tablet Take 1 tablet by mouth 4 (four) times daily as needed for Diarrhea.    gabapentin " (NEURONTIN) 100 MG capsule 200 mg every evening.    multivitamin (THERAGRAN) per tablet Take 1 tablet by mouth once daily.     omeprazole (PRILOSEC) 20 MG capsule Take 20 mg by mouth 2 (two) times daily.     oxybutynin (DITROPAN) 5 MG Tab Take 1 tablet (5 mg total) by mouth 3 (three) times daily as needed (Bladder spasms).    oxyCODONE-acetaminophen (PERCOCET) 5-325 mg per tablet Take 1 tablet by mouth every 6 (six) hours as needed for Pain.    promethazine (PHENERGAN) 25 MG tablet Take 1 tablet (25 mg total) by mouth every 8 (eight) hours as needed for Nausea.    promethazine (PHENERGAN) 25 MG tablet Take 1 tablet (25 mg total) by mouth every 6 (six) hours as needed for Nausea.    tamsulosin (FLOMAX) 0.4 mg Cap Take 1 capsule (0.4 mg total) by mouth every evening. for 7 days    zolpidem (AMBIEN) 10 mg Tab Take 10 mg by mouth every evening.     Family History       Problem Relation (Age of Onset)    Breast cancer Maternal Aunt, Cousin    Cirrhosis Maternal Aunt    Colon cancer Maternal Uncle    Hypertension Maternal Grandmother          Tobacco Use    Smoking status: Former     Current packs/day: 0.00     Average packs/day: 0.1 packs/day for 0.5 years     Types: Cigarettes     Start date: 3/16/1976     Quit date: 1976     Years since quittin.0    Smokeless tobacco: Never   Substance and Sexual Activity    Alcohol use: Yes     Alcohol/week: 1.0 standard drink of alcohol     Types: 1 Glasses of wine per week     Comment: rare    Drug use: No    Sexual activity: Never     Birth control/protection: Abstinence     Review of Systems   Reason unable to perform ROS: somnolence.     Objective:     Vital Signs (Most Recent):  Temp: 97.9 °F (36.6 °C) (24)  Pulse: 88 (24)  Resp: 14 (24)  BP: (!) 144/85 (24)  SpO2: 98 % (24) Vital Signs (24h Range):  Temp:  [96.6 °F (35.9 °C)-97.9 °F (36.6 °C)] 97.9 °F (36.6 °C)  Pulse:  [78-90] 88  Resp:  [12-28] 14  SpO2:  [98  %-100 %] 98 %  BP: (134-158)/(82-91) 144/85     Weight: 72.6 kg (160 lb)  Body mass index is 28.34 kg/m².     Physical Exam  Vitals and nursing note reviewed.   Constitutional:       General: She is not in acute distress.     Appearance: She is well-developed. She is obese. She is not diaphoretic.   HENT:      Head: Normocephalic and atraumatic.   Eyes:      General: No scleral icterus.     Conjunctiva/sclera: Conjunctivae normal.   Neck:      Vascular: No JVD.   Cardiovascular:      Rate and Rhythm: Normal rate and regular rhythm.   Pulmonary:      Effort: Pulmonary effort is normal. No respiratory distress.      Comments: Upper airway noises while sleeping, sats % on room air   Musculoskeletal:      Right lower leg: No edema.      Left lower leg: No edema.   Skin:     Coloration: Skin is not jaundiced or pale.   Neurological:      Motor: No abnormal muscle tone.      Comments: Somnolent but arouses to voice, does not sustain wakefulness during interview without stimulation, slow but non-slurred speech, oriented to at least person and place   Psychiatric:         Mood and Affect: Mood normal.         Behavior: Behavior normal.                Significant Labs: All pertinent labs within the past 24 hours have been reviewed.  CBC:   Recent Labs   Lab 09/11/24 1938   WBC 17.09*   HGB 13.9   HCT 42.9        CMP:   Recent Labs   Lab 09/11/24 1938      K 3.2*      CO2 20*   *   BUN 13   CREATININE 1.3   CALCIUM 9.5   PROT 8.5*   ALBUMIN 3.8   BILITOT 0.3   ALKPHOS 104   AST 40   ALT 23   ANIONGAP 16       Significant Imaging: I have reviewed all pertinent imaging results/findings within the past 24 hours.

## 2024-09-13 VITALS
OXYGEN SATURATION: 95 % | SYSTOLIC BLOOD PRESSURE: 129 MMHG | HEART RATE: 73 BPM | TEMPERATURE: 98 F | WEIGHT: 170 LBS | RESPIRATION RATE: 18 BRPM | HEIGHT: 63 IN | DIASTOLIC BLOOD PRESSURE: 80 MMHG | BODY MASS INDEX: 30.12 KG/M2

## 2024-09-13 LAB
ALBUMIN SERPL BCP-MCNC: 2.9 G/DL (ref 3.5–5.2)
ALP SERPL-CCNC: 77 U/L (ref 55–135)
ALT SERPL W/O P-5'-P-CCNC: 15 U/L (ref 10–44)
ANION GAP SERPL CALC-SCNC: 8 MMOL/L (ref 8–16)
AST SERPL-CCNC: 16 U/L (ref 10–40)
BASOPHILS # BLD AUTO: 0.03 K/UL (ref 0–0.2)
BASOPHILS NFR BLD: 0.4 % (ref 0–1.9)
BILIRUB SERPL-MCNC: 0.3 MG/DL (ref 0.1–1)
BUN SERPL-MCNC: 19 MG/DL (ref 8–23)
CALCIUM SERPL-MCNC: 8.8 MG/DL (ref 8.7–10.5)
CHLORIDE SERPL-SCNC: 105 MMOL/L (ref 95–110)
CO2 SERPL-SCNC: 25 MMOL/L (ref 23–29)
CREAT SERPL-MCNC: 1.1 MG/DL (ref 0.5–1.4)
DIFFERENTIAL METHOD BLD: ABNORMAL
EOSINOPHIL # BLD AUTO: 0.6 K/UL (ref 0–0.5)
EOSINOPHIL NFR BLD: 7.2 % (ref 0–8)
ERYTHROCYTE [DISTWIDTH] IN BLOOD BY AUTOMATED COUNT: 15.6 % (ref 11.5–14.5)
EST. GFR  (NO RACE VARIABLE): 56.1 ML/MIN/1.73 M^2
GLUCOSE SERPL-MCNC: 113 MG/DL (ref 70–110)
HCT VFR BLD AUTO: 32.7 % (ref 37–48.5)
HGB BLD-MCNC: 10.7 G/DL (ref 12–16)
IMM GRANULOCYTES # BLD AUTO: 0.02 K/UL (ref 0–0.04)
IMM GRANULOCYTES NFR BLD AUTO: 0.3 % (ref 0–0.5)
LYMPHOCYTES # BLD AUTO: 3.5 K/UL (ref 1–4.8)
LYMPHOCYTES NFR BLD: 44.6 % (ref 18–48)
MCH RBC QN AUTO: 28.9 PG (ref 27–31)
MCHC RBC AUTO-ENTMCNC: 32.7 G/DL (ref 32–36)
MCV RBC AUTO: 88 FL (ref 82–98)
MONOCYTES # BLD AUTO: 0.4 K/UL (ref 0.3–1)
MONOCYTES NFR BLD: 5 % (ref 4–15)
NEUTROPHILS # BLD AUTO: 3.3 K/UL (ref 1.8–7.7)
NEUTROPHILS NFR BLD: 42.5 % (ref 38–73)
NRBC BLD-RTO: 0 /100 WBC
PLATELET # BLD AUTO: 326 K/UL (ref 150–450)
PMV BLD AUTO: 11.4 FL (ref 9.2–12.9)
POTASSIUM SERPL-SCNC: 3.7 MMOL/L (ref 3.5–5.1)
PROT SERPL-MCNC: 6.6 G/DL (ref 6–8.4)
RBC # BLD AUTO: 3.7 M/UL (ref 4–5.4)
SODIUM SERPL-SCNC: 138 MMOL/L (ref 136–145)
WBC # BLD AUTO: 7.74 K/UL (ref 3.9–12.7)

## 2024-09-13 PROCEDURE — G0378 HOSPITAL OBSERVATION PER HR: HCPCS

## 2024-09-13 PROCEDURE — 25000003 PHARM REV CODE 250

## 2024-09-13 PROCEDURE — 25000003 PHARM REV CODE 250: Performed by: STUDENT IN AN ORGANIZED HEALTH CARE EDUCATION/TRAINING PROGRAM

## 2024-09-13 PROCEDURE — 85025 COMPLETE CBC W/AUTO DIFF WBC: CPT

## 2024-09-13 PROCEDURE — 36415 COLL VENOUS BLD VENIPUNCTURE: CPT

## 2024-09-13 PROCEDURE — 80053 COMPREHEN METABOLIC PANEL: CPT

## 2024-09-13 RX ORDER — HYDROXYZINE HYDROCHLORIDE 25 MG/1
50 TABLET, FILM COATED ORAL 3 TIMES DAILY PRN
Status: DISCONTINUED | OUTPATIENT
Start: 2024-09-13 | End: 2024-09-13 | Stop reason: HOSPADM

## 2024-09-13 RX ORDER — MIRTAZAPINE 15 MG/1
15 TABLET, FILM COATED ORAL NIGHTLY
Qty: 30 TABLET | Refills: 11 | Status: SHIPPED | OUTPATIENT
Start: 2024-09-13 | End: 2025-09-13

## 2024-09-13 RX ORDER — METHOCARBAMOL 500 MG/1
1000 TABLET, FILM COATED ORAL 3 TIMES DAILY
Qty: 180 TABLET | Refills: 3 | Status: SHIPPED | OUTPATIENT
Start: 2024-09-13

## 2024-09-13 RX ORDER — LIDOCAINE 50 MG/G
3 PATCH TOPICAL DAILY PRN
Qty: 30 PATCH | Refills: 0 | Status: SHIPPED | OUTPATIENT
Start: 2024-09-13

## 2024-09-13 RX ADMIN — ACETAMINOPHEN 650 MG: 325 TABLET, FILM COATED ORAL at 09:09

## 2024-09-13 RX ADMIN — AMLODIPINE BESYLATE 10 MG: 10 TABLET ORAL at 09:09

## 2024-09-13 RX ADMIN — METHOCARBAMOL 1000 MG: 500 TABLET ORAL at 09:09

## 2024-09-13 RX ADMIN — METHOCARBAMOL 1000 MG: 500 TABLET ORAL at 02:09

## 2024-09-13 RX ADMIN — ATORVASTATIN CALCIUM 10 MG: 10 TABLET, FILM COATED ORAL at 09:09

## 2024-09-13 RX ADMIN — THERA TABS 1 TABLET: TAB at 09:09

## 2024-09-13 NOTE — PLAN OF CARE
CHW provided patient with resources:    Mental Health Rehabilitation Services by: [facility]      Mental Health Counseling by: [facility] ([facility])    Medication Assistance by: [employer] ([facility])

## 2024-09-13 NOTE — DISCHARGE SUMMARY
"[facility] - Internal Medicine [facility] Medicine  Discharge Summary      Patient Name: Rosmery Woodard  MRN: [id number]  YISSEL: [id number]  Patient Class: OP- Observation  Admission Date: 9/11/2024  Hospital Length of Stay: 0 days  Discharge Date and Time:  09/13/2024 2:47 PM  Attending Physician: Ace Lira MD   Discharging Provider: Bassam Martinez PA-C  Primary Care Provider: Yuridia Primary Doctor  [facility] Medicine Team: [facility] Bassam Martinez PA-C  Primary Care Team: [facility]    HPI:   Rosmery Woodard is a 64 y.o. female with HTN, CKD, anxiety on chronic benzos, neuropathy who presents today with acute encephalopathy.     History provided mainly by ED due to somnolence and patient noncooperation with interview.     She was reportedly brought in by EMS by being found by her boyfriend with decreased mentation. Per EMS, responded to Narcan, but somnolence returned. She tells me that she thinks "anxiety" caused her presentation and that she does cocaine (last use 2 days ago) but otherwise provides little history.     Overall, workup unremarkable. UDS positive for benzos and cocaine. Had mild leukocytosis without evidence of infection. Hospital medicine consulted for likely illicit substance overdose.     * No surgery found *      Hospital Course:   Rosmery Woodard is admitted to  for management of toxic encephalopathy 2/2 recreational drug us. GCS improved s/p narcan while en route with EMS. CXR w/out large consolidation. CTH and L shoulder XR reviewed. Leukocytosis improved. UDS positive for benzos, cocaine, fentanyl. Lytes replaced. Addiction Psych consulted, recommendations for remeron nightly, rehab information provided. No signs of withdrawal. Patient symptomatically improved, pain controlled.    At discharge she will hold soha and ambien until Psychiatry follow up. Prescriptions provided for remeron, lidocaine patches, and robaxin. Referrals to Internal Medicine, Psychiatry, and " Orthopedics placed. Return precautions provided. Patient medically ready for discharge. Plan of care discussed with patient, patient agreeable to plan, and all questions answered.      Physical Exam    General: NAD. Obese.  Neuro: Alert and oriented. No focal deficits.  HEENT: EOMI. No JVD appreciated.  CVS: RRR.  Respiratory: Normal respiratory effort.    Abdominal: NTND, soft to palpation.    Extremities: Pulses full, equal, and regular over all 4 extremities.      Goals of Care Treatment Preferences:  Code Status: Full Code      SDOH Screening:  The patient was screened for utility difficulties, food insecurity, transport difficulties, housing insecurity, and interpersonal safety and there were no concerns identified this admission.     Consults:   Consults (From admission, onward)          Status Ordering Provider     Inpatient consult to Psychiatry  Once        Provider:  (Not yet assigned)    ARIK Lara            No new Assessment & Plan notes have been filed under this hospital service since the last note was generated.  Service: Hospital Medicine    Final Active Diagnoses:    Diagnosis Date Noted POA    PRINCIPAL PROBLEM:  Encephalopathy, toxic [G92.9] 09/12/2024 Yes    Polysubstance abuse [F19.10] 09/12/2024 Yes     Chronic    Benzodiazepine dependence [F13.20] 09/12/2024 Yes     Chronic    Polypharmacy [Z79.899] 09/12/2024 Not Applicable     Chronic    Leukocytosis [D72.829] 09/12/2024 Yes    Essential hypertension [I10]  Yes     Chronic    Hypokalemia [E87.6] 08/05/2014 Yes     Chronic      Problems Resolved During this Admission:       Discharged Condition: stable    Disposition: Home or Self Care    Follow Up:    Patient Instructions:      Ambulatory referral/consult to Psychiatry   Standing Status: Future   Referral Priority: Urgent Referral Type: Psychiatric   Referral Reason: Specialty Services Required   Requested Specialty: Psychiatry   Number of Visits Requested: 1     Ambulatory  referral/consult to Internal Medicine   Standing Status: Future   Referral Priority: Urgent Referral Type: Consultation   Referral Reason: Specialty Services Required   Requested Specialty: Internal Medicine   Number of Visits Requested: 1     Ambulatory referral/consult to Orthopedics   Standing Status: Future   Referral Priority: Urgent Referral Type: Consultation   Requested Specialty: Orthopedic Surgery   Number of Visits Requested: 1       Significant Diagnostic Studies: N/A    Pending Diagnostic Studies:       Procedure Component Value Units Date/Time    HIV 1/2 Ag/Ab (4th Gen) [[id number]]     Order Status: Sent Lab Status: No result     Specimen: Blood     Hepatitis C Antibody [[id number]]     Order Status: Sent Lab Status: No result     Specimen: Blood            Medications:  Reconciled Home Medications:      Medication List        START taking these medications      LIDOcaine 5 %  Commonly known as: LIDODERM  Place 3 patches onto the skin daily as needed (for shoulder and chest wall pain). Remove & Discard patches to shoulder and chest wall within 12 hours or as directed by MD.     methocarbamoL 500 MG Tab  Commonly known as: ROBAXIN  Take 2 tablets (1,000 mg total) by mouth 3 (three) times daily.     mirtazapine 15 MG tablet  Commonly known as: REMERON  Take 1 tablet (15 mg total) by mouth nightly.            CONTINUE taking these medications      ALPRAZolam 1 MG tablet  Commonly known as: XANAX  Take 1 mg by mouth 3 (three) times daily as needed for Anxiety.     amLODIPine 10 MG tablet  Commonly known as: NORVASC  Take 10 mg by mouth once daily.     atorvastatin 10 MG tablet  Commonly known as: LIPITOR  Take 10 mg by mouth once daily.     diphenhydrAMINE 25 mg capsule  Commonly known as: BENADRYL  Take 25 mg by mouth every 6 (six) hours as needed for Itching.     diphenoxylate-atropine 2.5-0.025 mg 2.5-0.025 mg per tablet  Commonly known as: LOMOTIL  Take 1 tablet by mouth 4 (four) times daily as needed  for Diarrhea.     gabapentin 100 MG capsule  Commonly known as: NEURONTIN  200 mg every evening.     multivitamin per tablet  Commonly known as: THERAGRAN  Take 1 tablet by mouth once daily.     omeprazole 20 MG capsule  Commonly known as: PRILOSEC  Take 20 mg by mouth 2 (two) times daily.     oxybutynin 5 MG Tab  Commonly known as: DITROPAN  Take 1 tablet (5 mg total) by mouth 3 (three) times daily as needed (Bladder spasms).     oxyCODONE-acetaminophen 5-325 mg per tablet  Commonly known as: PERCOCET  Take 1 tablet by mouth every 6 (six) hours as needed for Pain.     * promethazine 25 MG tablet  Commonly known as: PHENERGAN  Take 1 tablet (25 mg total) by mouth every 8 (eight) hours as needed for Nausea.     * promethazine 25 MG tablet  Commonly known as: PHENERGAN  Take 1 tablet (25 mg total) by mouth every 6 (six) hours as needed for Nausea.     tamsulosin 0.4 mg Cap  Commonly known as: FLOMAX  Take 1 capsule (0.4 mg total) by mouth every evening. for 7 days     zolpidem 10 mg Tab  Commonly known as: AMBIEN  Take 10 mg by mouth every evening.           * This list has 2 medication(s) that are the same as other medications prescribed for you. Read the directions carefully, and ask your doctor or other care provider to review them with you.                  Indwelling Lines/Drains at time of discharge:   Lines/Drains/Airways       Drain  Duration             Female External Urinary Catheter w/ Suction 09/11/24 1941 1 day                    Time spent on the discharge of patient: 47 minutes         Bassam Martinez PA-C  Department of Hospital Medicine  [facility] - Internal Medicine Telemetry

## 2024-09-13 NOTE — PLAN OF CARE
[facility] - Internal Medicine Telemetry  Initial Discharge Assessment       Primary Care Provider: No, Primary Doctor    Admission Diagnosis: Benzodiazepine overdose [T42.4X1A]  Chest pain [R07.9]  AMS (altered mental status) [R41.82]    Admission Date: 9/11/2024  Expected Discharge Date: 9/13/2024         Payor: HU[employer]NA MANAGED MEDICARE / Plan: HU[employer]NA SNP HMO PPO SPECIAL NEEDS / Product Type: Medicare Advantage /     Extended Emergency Contact Information  Primary Emergency Contact: Rob Joe  Address: [address] [place] [address] [place]  Mobile Phone: [phone]  Relation: Daughter    Discharge Plan A: (P) Home with family  Discharge Plan B: (P) Home      [id number] - [place], [place] - [address] AT [facility]  [address] [place] [address]  Phone: [phone] Fax: [phone]      Initial Assessment (most recent)       Adult Discharge Assessment - 09/12/24 1530          Discharge Assessment    Assessment Type Discharge Planning Assessment (P)      Confirmed/corrected address, phone number and insurance Yes (P)      Confirmed Demographics Correct on Facesheet (P)      Source of Information patient;family (P)      Contact Name/Number Rob Joe (Daughter)  [phone]; Leeanna Miles (Mom) [phone]; Alexx Joei (daughter) [phone]; Sheela Joe (daughter) [phone]; Zohaib Merchant (daughter) [phone] (P)      Communicated GERONIMO with patient/caregiver Yes (P)      People in Home alone (P)      Do you expect to return to your current living situation? Yes (P)      Do you have help at home or someone to help you manage your care at home? Yes (P)      Who are your caregiver(s) and their phone number(s)? Rob Joe (Daughter) [phone]; Leeanna Miles (Mom) [phone]; Alexx Joei (daughter) [phone]; Sheela Joe (daughter) [phone]; Zohaib Merchant (daughter) [phone] (P)      Prior to  hospitilization cognitive status: Alert/Oriented (P)      Current cognitive status: Alert/Oriented (P)      Walking or Climbing Stairs Difficulty no (P)      Dressing/Bathing Difficulty no (P)      Home Accessibility stairs to enter home (P)      Number of Stairs, Main Entrance five (P)      Home Layout Able to live on 1st floor (P)      Equipment Currently Used at Home none (P)      Readmission within 30 days? No (P)      Patient currently being followed by outpatient case management? No (P)      Do you currently have service(s) that help you manage your care at home? No (P)      Do you take prescription medications? Yes (P)      Do you have prescription coverage? Yes (P)      Cover[facility] (P)      Do you have any problems affording any of your prescribed medications? Yes (P)      If yes, what medications? blood pressure meds (P)      Is the patient taking medications as prescribed? yes (P)      Who is going to help you get home at discharge? Rob Joe (Daughter) [phone]; Leeanna Miles (Mom) [phone]; Rita Joe (daughter) [phone]; Sheela Joe (daughter) [phone]; Zohaib Merchant (daughter) [phone] (P)      How do you get to doctors appointments? family or friend will provide;public transportation (P)      Are you on dialysis? No (P)      Do you take coumadin? No (P)      Discharge Plan A Home with family (P)      Discharge Plan B Home (P)      DME Needed Upon Discharge  none (P)      Discharge Plan discussed with: Patient;Adult children;Parent(s) (P)      Name(s) and Number(s) HeathRob (Daughter) [phone]; Leeanna Miles (Mom) [phone]; Rita Joe (daughter) [phone]; Sheela Joe (daughter) [phone]; Zohaib Merchant (daughter) [phone] (P)         Physical Activity    On average, how many days per week do you engage in moderate to strenuous exercise (like a brisk walk)? 0 days (P)      On average, how many minutes do you engage in exercise at this  level? 0 min (P)         Financial Resource Strain    How hard is it for you to pay for the very basics like food, housing, medical care, and heating? Somewhat hard (P)         Housing Stability    In the last 12 months, was there a time when you were not able to pay the mortgage or rent on time? No (P)      At any time in the past 12 months, were you homeless or living in a shelter (including now)? No (P)         Transportation Needs    Has the lack of transportation kept you from medical appointments, meetings, work or from getting things needed for daily living? No (P)         Food Insecurity    Within the past 12 months, you worried that your food would run out before you got the money to buy more. Never true (P)      Within the past 12 months, the food you bought just didn't last and you didn't have money to get more. Never true (P)         Stress    Do you feel stress - tense, restless, nervous, or anxious, or unable to sleep at night because your mind is troubled all the time - these days? Very much (P)         Social Isolation    How often do you feel lonely or isolated from those around you?  Sometimes (P)         Alcohol Use    Q1: How often do you have a drink containing alcohol? Never (P)      Q2: How many drinks containing alcohol do you have on a typical day when you are drinking? Patient does not drink (P)      Q3: How often do you have six or more drinks on one occasion? Never (P)         Utilities    In the past 12 months has the electric, gas, oil, or water company threatened to shut off services in your home? No (P)         Health Literacy    How often do you need to have someone help you when you read instructions, pamphlets, or other written material from your doctor or pharmacy? Never (P)                  Discharge Plan A and Plan B have been determined by review of patient's clinical status, future medical and therapeutic needs, and coverage/benefits for post-acute care in coordination with  multidisciplinary team members.                       YARELIS Rutherford, LMSW  [facility]  Case Management  Ext. [phone]

## 2024-09-16 ENCOUNTER — TELEPHONE (OUTPATIENT)
Dept: ORTHOPEDICS | Facility: CLINIC | Age: 64
End: 2024-09-16
Payer: MEDICARE

## 2024-09-17 ENCOUNTER — TELEPHONE (OUTPATIENT)
Dept: ORTHOPEDICS | Facility: CLINIC | Age: 64
End: 2024-09-17
Payer: MEDICARE

## 2024-09-17 DIAGNOSIS — M51.36 DDD (DEGENERATIVE DISC DISEASE), LUMBAR: Primary | ICD-10-CM

## 2024-09-17 NOTE — TELEPHONE ENCOUNTER
Attempt to contact patient regarding x-ray. Left message stating that the x-ray are scheduled for 2:30 pm at the [facility] on 09/18/2024. Also left number for patient to return call back to [phone]. Thanks.

## 2024-09-19 NOTE — PLAN OF CARE
[facility] - Internal Medicine Telemetry  Discharge Final Note    Primary Care Provider: No, Primary Doctor    Expected Discharge Date: 9/13/2024    Final Discharge Note (most recent)       Final Note - 09/19/24 0855          Final Note    Assessment Type Final Discharge Note (P)      Anticipated Discharge Disposition Home or Self Care (P)      What phone number can be called within the next 1-3 days to see how you are doing after discharge? [phone] (P)         Post-Acute Status    Post-Acute Authorization Other (P)      Coverage [employer] MANAGED MEDICARE - [employer] \[facility]\"" HMO PPO SPECIAL NEEDS - (P)      Other Status Awaiting f/u Appts (P)                      Important Message from Medicare             Patient discharged home w/ family via personal vehicle.                    YARELIS Rutherford, LMSW  [facility]  Case Management  Ext. [phone]

## 2024-11-16 ENCOUNTER — HOSPITAL ENCOUNTER (EMERGENCY)
Facility: HOSPITAL | Age: 64
Discharge: HOME | End: 2024-11-16
Attending: EMERGENCY MEDICINE
Payer: MEDICARE

## 2024-11-16 VITALS
HEART RATE: 68 BPM | TEMPERATURE: 98 F | OXYGEN SATURATION: 95 % | SYSTOLIC BLOOD PRESSURE: 143 MMHG | DIASTOLIC BLOOD PRESSURE: 87 MMHG | BODY MASS INDEX: 29.94 KG/M2 | RESPIRATION RATE: 16 BRPM | WEIGHT: 169 LBS

## 2024-11-16 DIAGNOSIS — K51.919 ULCERATIVE COLITIS WITH COMPLICATION, UNSPECIFIED LOCATION: ICD-10-CM

## 2024-11-16 DIAGNOSIS — R11.2 NAUSEA AND VOMITING, UNSPECIFIED VOMITING TYPE: Primary | ICD-10-CM

## 2024-11-16 DIAGNOSIS — R11.10 EMESIS: ICD-10-CM

## 2024-11-16 LAB
ALBUMIN SERPL BCP-MCNC: 3.4 G/DL (ref 3.5–5.2)
ALP SERPL-CCNC: 66 U/L (ref 40–150)
ALT SERPL W/O P-5'-P-CCNC: 15 U/L (ref 10–44)
ANION GAP SERPL CALC-SCNC: 10 MMOL/L (ref 8–16)
AST SERPL-CCNC: 20 U/L (ref 10–40)
BASOPHILS # BLD AUTO: 0.04 K/UL (ref 0–0.2)
BASOPHILS NFR BLD: 0.7 % (ref 0–1.9)
BILIRUB SERPL-MCNC: 0.3 MG/DL (ref 0.1–1)
BILIRUB UR QL STRIP: NEGATIVE
BUN SERPL-MCNC: 12 MG/DL (ref 8–23)
CALCIUM SERPL-MCNC: 8.9 MG/DL (ref 8.7–10.5)
CHLORIDE SERPL-SCNC: 105 MMOL/L (ref 95–110)
CLARITY UR REFRACT.AUTO: CLEAR
CO2 SERPL-SCNC: 27 MMOL/L (ref 23–29)
COLOR UR AUTO: COLORLESS
CREAT SERPL-MCNC: 1 MG/DL (ref 0.5–1.4)
DIFFERENTIAL METHOD BLD: ABNORMAL
EOSINOPHIL # BLD AUTO: 0.3 K/UL (ref 0–0.5)
EOSINOPHIL NFR BLD: 5.2 % (ref 0–8)
ERYTHROCYTE [DISTWIDTH] IN BLOOD BY AUTOMATED COUNT: 14.1 % (ref 11.5–14.5)
EST. GFR  (NO RACE VARIABLE): >60 ML/MIN/1.73 M^2
GLUCOSE SERPL-MCNC: 93 MG/DL (ref 70–110)
GLUCOSE UR QL STRIP: NEGATIVE
HCT VFR BLD AUTO: 36.4 % (ref 37–48.5)
HGB BLD-MCNC: 12.1 G/DL (ref 12–16)
HGB UR QL STRIP: NEGATIVE
IMM GRANULOCYTES # BLD AUTO: 0.01 K/UL (ref 0–0.04)
IMM GRANULOCYTES NFR BLD AUTO: 0.2 % (ref 0–0.5)
KETONES UR QL STRIP: NEGATIVE
LEUKOCYTE ESTERASE UR QL STRIP: NEGATIVE
LIPASE SERPL-CCNC: 19 U/L (ref 4–60)
LYMPHOCYTES # BLD AUTO: 3.3 K/UL (ref 1–4.8)
LYMPHOCYTES NFR BLD: 54.9 % (ref 18–48)
MAGNESIUM SERPL-MCNC: 1.9 MG/DL (ref 1.6–2.6)
MCH RBC QN AUTO: 28.5 PG (ref 27–31)
MCHC RBC AUTO-ENTMCNC: 33.2 G/DL (ref 32–36)
MCV RBC AUTO: 86 FL (ref 82–98)
MONOCYTES # BLD AUTO: 0.5 K/UL (ref 0.3–1)
MONOCYTES NFR BLD: 7.7 % (ref 4–15)
NEUTROPHILS # BLD AUTO: 1.9 K/UL (ref 1.8–7.7)
NEUTROPHILS NFR BLD: 31.3 % (ref 38–73)
NITRITE UR QL STRIP: NEGATIVE
NRBC BLD-RTO: 0 /100 WBC
PH UR STRIP: 7 [PH] (ref 5–8)
PLATELET # BLD AUTO: 401 K/UL (ref 150–450)
PMV BLD AUTO: 11 FL (ref 9.2–12.9)
POTASSIUM SERPL-SCNC: 3.3 MMOL/L (ref 3.5–5.1)
PROT SERPL-MCNC: 7.6 G/DL (ref 6–8.4)
PROT UR QL STRIP: NEGATIVE
RBC # BLD AUTO: 4.24 M/UL (ref 4–5.4)
SODIUM SERPL-SCNC: 142 MMOL/L (ref 136–145)
SP GR UR STRIP: 1.01 (ref 1–1.03)
URN SPEC COLLECT METH UR: ABNORMAL
WBC # BLD AUTO: 5.97 K/UL (ref 3.9–12.7)

## 2024-11-16 PROCEDURE — 81003 URINALYSIS AUTO W/O SCOPE: CPT | Performed by: EMERGENCY MEDICINE

## 2024-11-16 PROCEDURE — 83735 ASSAY OF MAGNESIUM: CPT | Performed by: EMERGENCY MEDICINE

## 2024-11-16 PROCEDURE — 80053 COMPREHEN METABOLIC PANEL: CPT | Performed by: EMERGENCY MEDICINE

## 2024-11-16 PROCEDURE — 85025 COMPLETE CBC W/AUTO DIFF WBC: CPT | Performed by: EMERGENCY MEDICINE

## 2024-11-16 PROCEDURE — 63600175 PHARM REV CODE 636 W HCPCS: Performed by: EMERGENCY MEDICINE

## 2024-11-16 PROCEDURE — 25000003 PHARM REV CODE 250: Performed by: EMERGENCY MEDICINE

## 2024-11-16 PROCEDURE — 83690 ASSAY OF LIPASE: CPT | Performed by: EMERGENCY MEDICINE

## 2024-11-16 PROCEDURE — 93005 ELECTROCARDIOGRAM TRACING: CPT

## 2024-11-16 PROCEDURE — 93010 ELECTROCARDIOGRAM REPORT: CPT | Mod: ,,, | Performed by: INTERNAL MEDICINE

## 2024-11-16 PROCEDURE — 96365 THER/PROPH/DIAG IV INF INIT: CPT

## 2024-11-16 PROCEDURE — 99284 EMERGENCY DEPT VISIT MOD MDM: CPT | Mod: 25

## 2024-11-16 RX ORDER — OXYCODONE AND ACETAMINOPHEN 5; 325 MG/1; MG/1
1 TABLET ORAL
Status: COMPLETED | OUTPATIENT
Start: 2024-11-16 | End: 2024-11-16

## 2024-11-16 RX ORDER — OXYCODONE HYDROCHLORIDE 5 MG/1
5 TABLET ORAL EVERY 6 HOURS PRN
Qty: 10 TABLET | Refills: 0 | Status: SHIPPED | OUTPATIENT
Start: 2024-11-16

## 2024-11-16 RX ORDER — ONDANSETRON HYDROCHLORIDE 2 MG/ML
4 INJECTION, SOLUTION INTRAVENOUS
Status: DISCONTINUED | OUTPATIENT
Start: 2024-11-16 | End: 2024-11-16

## 2024-11-16 RX ORDER — MESALAMINE 800 MG/1
800 TABLET, DELAYED RELEASE ORAL 3 TIMES DAILY
Qty: 90 TABLET | Refills: 0 | Status: SHIPPED | OUTPATIENT
Start: 2024-11-16 | End: 2024-12-20

## 2024-11-16 RX ORDER — PROMETHAZINE HYDROCHLORIDE 25 MG/1
25 TABLET ORAL EVERY 6 HOURS PRN
Qty: 20 TABLET | Refills: 0 | Status: SHIPPED | OUTPATIENT
Start: 2024-11-16

## 2024-11-16 RX ORDER — POTASSIUM CHLORIDE 20 MEQ/1
40 TABLET, EXTENDED RELEASE ORAL
Status: COMPLETED | OUTPATIENT
Start: 2024-11-16 | End: 2024-11-16

## 2024-11-16 RX ORDER — DROPERIDOL 2.5 MG/ML
0.62 INJECTION, SOLUTION INTRAMUSCULAR; INTRAVENOUS
Status: DISCONTINUED | OUTPATIENT
Start: 2024-11-16 | End: 2024-11-16

## 2024-11-16 RX ADMIN — POTASSIUM CHLORIDE 40 MEQ: 1500 TABLET, EXTENDED RELEASE ORAL at 02:11

## 2024-11-16 RX ADMIN — PROMETHAZINE HYDROCHLORIDE 12.5 MG: 25 INJECTION INTRAMUSCULAR; INTRAVENOUS at 02:11

## 2024-11-16 RX ADMIN — OXYCODONE HYDROCHLORIDE AND ACETAMINOPHEN 1 TABLET: 5; 325 TABLET ORAL at 01:11

## 2024-11-16 NOTE — ED NOTES
MD at bedside- explains test results, POC  and dc instrx to pt. Discusses pt's continued pain w/ pt. No further orders noted.

## 2024-11-16 NOTE — ED NOTES
LOC: The patient is awake, alert, and oriented to self, place, time, and situation. Pt is calm and cooperative. Affect is appropriate.  Speech is appropriate and clear.     APPEARANCE: Patient resting uncomfortably, reporting diarrhea, nausea, vomiting, abdominal pain, anorexia.   Patient is clean and well groomed.    SKIN: The skin is warm and dry; color consistent with ethnicity.  Patient has normal skin turgor and moist mucus membranes.  Skin intact; no breakdown or bruising noted.     MUSCULOSKELETAL: Patient moving upper and lower extremities without difficulty; denies pain in the extremities or back.  Reporting weakness.     RESPIRATORY: Airway is open and patent. Respirations spontaneous, even, easy, and non-labored.  Patient has a normal effort and rate.  No accessory muscle use noted. Denies cough.     CARDIAC:  Normal heart rate noted.  No peripheral edema noted. No complaints of chest pain.     ABDOMEN: Soft and tender to palpation.  No distention noted. Pt reporting abdominal pain;  nausea, vomiting, diarrhea.    NEUROLOGIC: Eyes open spontaneously.  Behavior appropriate to situation.  Follows commands; facial expression symmetrical.  Purposeful motor response noted; normal sensation in all extremities. Pt denies headache; denies lightheadedness or dizziness; denies visual disturbances; denies loss of balance; denies unilateral weakness.

## 2024-11-16 NOTE — ED PROVIDER NOTES
"Encounter Date: 11/16/2024       History     Chief Complaint   Patient presents with    Abdominal Pain     Abd pain, N/V for x week and a half. Hx ulcerative colitis      HPI  64-year-old female with a history of ulcerative colitis, hypertension, asthma who presents with abdominal pain, nausea and vomiting.  Symptoms has been going on for 1-1/2 weeks.  Has had multiple visits to the emergency department and her PCP.  Per PCP note, felt she was likely drug-seeking for relief of pain and referred her back to gastroenterologist for further management.  Per note, he was prescribed mesalamine for her also diff colitis as well as promethazine for her nausea.  She was states she has been taking both of these but her symptoms have worsened over the last 2 weeks.  She reports diffuse abdominal pain, sometimes epigastric, sometimes left lower quadrant.  Nothing makes it better or worse.  No chest pain or shortness of breath.  She reports temperatures of 99.3, nothing higher.  Has been taking Tylenol for pain occasionally.  Also reports nausea and vomiting.  No blood in her emesis but does have blood in her stool.  Also reports some burning with urination.  When asked if she has followed up with her GI doctor, she states I want to switch to a different doctor so I have not.      Review of patient's allergies indicates:   Allergen Reactions    Versed [midazolam] Nausea And Vomiting    Bentyl [dicyclomine] Hives    Darvocet a500 [propoxyphene n-acetaminophen] Hives    Toradol [ketorolac] Hives    Morphine Nausea And Vomiting     12/7/22 @ 0935 Pt, stated "I take Dilaudid without any reactions to it" pt also states that she "can take oxycodone for pain."      Past Medical History:   Diagnosis Date    Asthma     Hypertension     Liver abscess     hx    Portal vein thrombosis 10/2013    Portal vein thrombosis 10/2013    Ulcerative colitis 9/15/2013     Past Surgical History:   Procedure Laterality Date    ABDOMINAL SURGERY      3 " exploratory surgeries    APPENDECTOMY       SECTION, LOW TRANSVERSE      3 C-sections    CHOLECYSTECTOMY      COLONOSCOPY      COLONOSCOPY N/A 2016    Procedure: COLONOSCOPY;  Surgeon: Brad King MD;  Location: [address]);  Service: Endoscopy;  Laterality: N/A;    EXPLORATORY LAPAROTOMY W/ BOWEL RESECTION      HERNIA REPAIR      HYSTERECTOMY      INCISIONAL HERNIA REPAIR      UPPER GASTROINTESTINAL ENDOSCOPY      URETERAL STENT PLACEMENT Left 2022    Procedure: INSERTION, STENT, URETER;  Surgeon: Mann Geronimo MD;  Location: [facility];  Service: Urology;  Laterality: Left;     Family History   Problem Relation Name Age of Onset    Hypertension Maternal Grandmother      Cirrhosis Maternal Aunt      Breast cancer Maternal Aunt      Colon cancer Maternal Uncle      Breast cancer Cousin      Celiac disease Neg Hx      Colon polyps Neg Hx      Crohn's disease Neg Hx      Cystic fibrosis Neg Hx      Esophageal cancer Neg Hx      Hemochromatosis Neg Hx      Inflammatory bowel disease Neg Hx      Irritable bowel syndrome Neg Hx      Liver cancer Neg Hx      Liver disease Neg Hx      Rectal cancer Neg Hx      Stomach cancer Neg Hx      Ulcerative colitis Neg Hx      Scott's disease Neg Hx       Social History     Tobacco Use    Smoking status: Former     Current packs/day: 0.00     Average packs/day: 0.1 packs/day for 0.5 years     Types: Cigarettes     Start date: 3/16/1976     Quit date: 1976     Years since quittin.2    Smokeless tobacco: Never   Substance Use Topics    Alcohol use: Yes     Alcohol/week: 1.0 standard drink of alcohol     Types: 1 Glasses of wine per week     Comment: rare    Drug use: No     Review of Systems    Physical Exam     Initial Vitals [24 1132]   BP Pulse Resp Temp SpO2   (!) 119/91 82 17 97.7 °F (36.5 °C) 98 %      MAP       --         Physical Exam    Nursing note and vitals reviewed.  Constitutional: She appears well-developed  and well-nourished. No distress.   HENT:   Head: Normocephalic and atraumatic.   Nose: Nose normal.   Eyes: Conjunctivae and EOM are normal. Pupils are equal, round, and reactive to light.   Neck:   Normal range of motion.  Cardiovascular:  Normal rate, regular rhythm and normal heart sounds.     Exam reveals no gallop and no friction rub.       No murmur heard.  Pulmonary/Chest: Breath sounds normal. No respiratory distress. She has no wheezes. She has no rhonchi. She has no rales.   Abdominal: Abdomen is soft. She exhibits no distension. There is no abdominal tenderness. There is no rebound and no guarding.   Musculoskeletal:         General: No tenderness or edema. Normal range of motion.      Cervical back: Normal range of motion.     Neurological: She is alert and oriented to person, place, and time. She has normal strength. No sensory deficit.   Skin: Skin is warm and dry. Capillary refill takes less than 2 seconds.   Psychiatric: She has a normal mood and affect.         ED Course   Procedures  Labs Reviewed   CBC W/ AUTO DIFFERENTIAL - Abnormal       Result Value    WBC 5.97      RBC 4.24      Hemoglobin 12.1      Hematocrit 36.4 (*)     MCV 86      MCH 28.5      MCHC 33.2      RDW 14.1      Platelets 401      MPV 11.0      Immature Granulocytes 0.2      Gran # (ANC) 1.9      Immature Grans (Abs) 0.01      Lymph # 3.3      Mono # 0.5      Eos # 0.3      Baso # 0.04      nRBC 0      Gran % 31.3 (*)     Lymph % 54.9 (*)     Mono % 7.7      Eosinophil % 5.2      Basophil % 0.7      Differential Method Automated     COMPREHENSIVE METABOLIC PANEL - Abnormal    Sodium 142      Potassium 3.3 (*)     Chloride 105      CO2 27      Glucose 93      BUN 12      Creatinine 1.0      Calcium 8.9      Total Protein 7.6      Albumin 3.4 (*)     Total Bilirubin 0.3      Alkaline Phosphatase 66      AST 20      ALT 15      eGFR >60.0      Anion Gap 10     URINALYSIS, REFLEX TO URINE CULTURE - Abnormal    Specimen UA Urine,  Clean Catch      Color, UA Colorless (*)     Appearance, UA Clear      pH, UA 7.0      Specific Gravity, UA 1.010      Protein, UA Negative      Glucose, UA Negative      Ketones, UA Negative      Bilirubin (UA) Negative      Occult Blood UA Negative      Nitrite, UA Negative      Leukocytes, UA Negative      Narrative:     Specimen Source->Urine   LIPASE    Lipase 19     MAGNESIUM   MAGNESIUM    Magnesium 1.9      Narrative:     ADD ON MAGNESIUM PER DR ISAIAH GILLIAM/ORDER# [id number] @ 2:17P          Imaging Results    None          Medications   oxyCODONE-acetaminophen 5-325 mg per tablet 1 tablet (1 tablet Oral Given 11/16/24 1343)   promethazine (PHENERGAN) 12.5 mg in 0.9% NaCl 50 mL IVPB (0 mg Intravenous Stopped 11/16/24 1454)   potassium chloride SA CR tablet 40 mEq (40 mEq Oral Given 11/16/24 1431)     Medical Decision Making  64-year-old female with a history of ulcerative colitis who presents with 1-2 weeks of a flare.  Symptoms do sound likely to be a flare and less likely anything else.  She reports bloody stools, low-grade temperatures, nausea and vomiting, abdominal pain.  She actually looks quite well, not in any acute distress.  Her abdominal exam is reassuring.  Considered other pathology such as appendicitis, obstruction, pancreatitis, UTI feel much less likely.  We will get lab work but I do not think she needs a CT at this time.  She was had multiple in the past and, unless labs show something acutely changed, this sounds like a classic flare.  We will give nausea, pain control, check labs.  Disposition pending.    Labs okay. Given oral K repletion.  Feeling better. Stable for dc. Given a short course of pain meds/nausea meds. Wanted to be referred to gi here so referral placed    Amount and/or Complexity of Data Reviewed  Labs: ordered.    Risk  Prescription drug management.                                      Clinical Impression:  Final diagnoses:  [R11.2] Nausea and vomiting, unspecified  vomiting type (Primary)  [K51.919] Ulcerative colitis with complication, unspecified location                 Irina Singh MD  11/16/24 9486

## 2024-11-17 LAB
OHS QRS DURATION: 94 MS
OHS QTC CALCULATION: 479 MS

## 2024-12-01 ENCOUNTER — HOSPITAL ENCOUNTER (EMERGENCY)
Facility: HOSPITAL | Age: 64
Discharge: HOME | End: 2024-12-01
Attending: EMERGENCY MEDICINE
Payer: MEDICARE

## 2024-12-01 VITALS
TEMPERATURE: 97 F | OXYGEN SATURATION: 98 % | RESPIRATION RATE: 18 BRPM | WEIGHT: 169 LBS | SYSTOLIC BLOOD PRESSURE: 145 MMHG | HEIGHT: 63 IN | DIASTOLIC BLOOD PRESSURE: 83 MMHG | HEART RATE: 70 BPM | BODY MASS INDEX: 29.95 KG/M2

## 2024-12-01 DIAGNOSIS — R11.2 NAUSEA AND VOMITING, UNSPECIFIED VOMITING TYPE: Primary | ICD-10-CM

## 2024-12-01 DIAGNOSIS — F41.9 ANXIETY: ICD-10-CM

## 2024-12-01 DIAGNOSIS — R10.9 ABDOMINAL PAIN, UNSPECIFIED ABDOMINAL LOCATION: ICD-10-CM

## 2024-12-01 LAB
ALBUMIN SERPL BCP-MCNC: 3.8 G/DL (ref 3.5–5.2)
ALP SERPL-CCNC: 77 U/L (ref 40–150)
ALT SERPL W/O P-5'-P-CCNC: 17 U/L (ref 10–44)
ANION GAP SERPL CALC-SCNC: 13 MMOL/L (ref 8–16)
AST SERPL-CCNC: 28 U/L (ref 10–40)
BACTERIA #/AREA URNS AUTO: NORMAL /HPF
BASOPHILS # BLD AUTO: 0.05 K/UL (ref 0–0.2)
BASOPHILS NFR BLD: 0.5 % (ref 0–1.9)
BILIRUB SERPL-MCNC: 0.3 MG/DL (ref 0.1–1)
BILIRUB UR QL STRIP: NEGATIVE
BUN SERPL-MCNC: 13 MG/DL (ref 8–23)
BUN SERPL-MCNC: 14 MG/DL (ref 6–30)
CALCIUM SERPL-MCNC: 9.4 MG/DL (ref 8.7–10.5)
CHLORIDE SERPL-SCNC: 100 MMOL/L (ref 95–110)
CHLORIDE SERPL-SCNC: 98 MMOL/L (ref 95–110)
CLARITY UR REFRACT.AUTO: ABNORMAL
CO2 SERPL-SCNC: 26 MMOL/L (ref 23–29)
COLOR UR AUTO: YELLOW
CREAT SERPL-MCNC: 1.1 MG/DL (ref 0.5–1.4)
CREAT SERPL-MCNC: 1.1 MG/DL (ref 0.5–1.4)
DIFFERENTIAL METHOD BLD: ABNORMAL
EOSINOPHIL # BLD AUTO: 0.1 K/UL (ref 0–0.5)
EOSINOPHIL NFR BLD: 0.7 % (ref 0–8)
ERYTHROCYTE [DISTWIDTH] IN BLOOD BY AUTOMATED COUNT: 13.9 % (ref 11.5–14.5)
EST. GFR  (NO RACE VARIABLE): 56.1 ML/MIN/1.73 M^2
GLUCOSE SERPL-MCNC: 106 MG/DL (ref 70–110)
GLUCOSE SERPL-MCNC: 117 MG/DL (ref 70–110)
GLUCOSE UR QL STRIP: NEGATIVE
HCT VFR BLD AUTO: 37.6 % (ref 37–48.5)
HCT VFR BLD CALC: 38 %PCV (ref 36–54)
HGB BLD-MCNC: 12.7 G/DL (ref 12–16)
HGB UR QL STRIP: NEGATIVE
HYALINE CASTS UR QL AUTO: 0 /LPF
IMM GRANULOCYTES # BLD AUTO: 0.03 K/UL (ref 0–0.04)
IMM GRANULOCYTES NFR BLD AUTO: 0.3 % (ref 0–0.5)
KETONES UR QL STRIP: NEGATIVE
LEUKOCYTE ESTERASE UR QL STRIP: ABNORMAL
LIPASE SERPL-CCNC: 17 U/L (ref 4–60)
LYMPHOCYTES # BLD AUTO: 2.6 K/UL (ref 1–4.8)
LYMPHOCYTES NFR BLD: 25.8 % (ref 18–48)
MCH RBC QN AUTO: 28.6 PG (ref 27–31)
MCHC RBC AUTO-ENTMCNC: 33.8 G/DL (ref 32–36)
MCV RBC AUTO: 85 FL (ref 82–98)
MICROSCOPIC COMMENT: NORMAL
MONOCYTES # BLD AUTO: 0.4 K/UL (ref 0.3–1)
MONOCYTES NFR BLD: 4 % (ref 4–15)
NEUTROPHILS # BLD AUTO: 7 K/UL (ref 1.8–7.7)
NEUTROPHILS NFR BLD: 68.7 % (ref 38–73)
NITRITE UR QL STRIP: NEGATIVE
NRBC BLD-RTO: 0 /100 WBC
PH UR STRIP: 7 [PH] (ref 5–8)
PLATELET # BLD AUTO: 480 K/UL (ref 150–450)
PMV BLD AUTO: 10.6 FL (ref 9.2–12.9)
POC IONIZED CALCIUM: 1.1 MMOL/L (ref 1.06–1.42)
POC TCO2 (MEASURED): 30 MMOL/L (ref 23–29)
POTASSIUM BLD-SCNC: 3.2 MMOL/L (ref 3.5–5.1)
POTASSIUM SERPL-SCNC: 3.8 MMOL/L (ref 3.5–5.1)
PROT SERPL-MCNC: 8.6 G/DL (ref 6–8.4)
PROT UR QL STRIP: ABNORMAL
RBC # BLD AUTO: 4.44 M/UL (ref 4–5.4)
RBC #/AREA URNS AUTO: 4 /HPF (ref 0–4)
SAMPLE: ABNORMAL
SODIUM BLD-SCNC: 141 MMOL/L (ref 136–145)
SODIUM SERPL-SCNC: 139 MMOL/L (ref 136–145)
SP GR UR STRIP: 1.02 (ref 1–1.03)
SQUAMOUS #/AREA URNS AUTO: 0 /HPF
URN SPEC COLLECT METH UR: ABNORMAL
WBC # BLD AUTO: 10.18 K/UL (ref 3.9–12.7)
WBC #/AREA URNS AUTO: 2 /HPF (ref 0–5)

## 2024-12-01 PROCEDURE — 81001 URINALYSIS AUTO W/SCOPE: CPT | Performed by: EMERGENCY MEDICINE

## 2024-12-01 PROCEDURE — 96374 THER/PROPH/DIAG INJ IV PUSH: CPT | Mod: 59

## 2024-12-01 PROCEDURE — 63600175 PHARM REV CODE 636 W HCPCS

## 2024-12-01 PROCEDURE — 83690 ASSAY OF LIPASE: CPT | Performed by: EMERGENCY MEDICINE

## 2024-12-01 PROCEDURE — 80047 BASIC METABLC PNL IONIZED CA: CPT

## 2024-12-01 PROCEDURE — 25000003 PHARM REV CODE 250

## 2024-12-01 PROCEDURE — 96375 TX/PRO/DX INJ NEW DRUG ADDON: CPT

## 2024-12-01 PROCEDURE — 85025 COMPLETE CBC W/AUTO DIFF WBC: CPT | Performed by: EMERGENCY MEDICINE

## 2024-12-01 PROCEDURE — 25500020 PHARM REV CODE 255: Performed by: EMERGENCY MEDICINE

## 2024-12-01 PROCEDURE — 99285 EMERGENCY DEPT VISIT HI MDM: CPT | Mod: 25

## 2024-12-01 PROCEDURE — 96376 TX/PRO/DX INJ SAME DRUG ADON: CPT

## 2024-12-01 PROCEDURE — 80053 COMPREHEN METABOLIC PANEL: CPT | Performed by: EMERGENCY MEDICINE

## 2024-12-01 RX ORDER — ONDANSETRON HYDROCHLORIDE 2 MG/ML
4 INJECTION, SOLUTION INTRAVENOUS
Status: COMPLETED | OUTPATIENT
Start: 2024-12-01 | End: 2024-12-01

## 2024-12-01 RX ORDER — ALPRAZOLAM 0.25 MG/1
1 TABLET ORAL
Status: COMPLETED | OUTPATIENT
Start: 2024-12-01 | End: 2024-12-01

## 2024-12-01 RX ORDER — LORAZEPAM 2 MG/ML
1 INJECTION INTRAMUSCULAR
Status: COMPLETED | OUTPATIENT
Start: 2024-12-01 | End: 2024-12-01

## 2024-12-01 RX ORDER — HALOPERIDOL 5 MG/ML
5 INJECTION INTRAMUSCULAR
Status: DISCONTINUED | OUTPATIENT
Start: 2024-12-01 | End: 2024-12-01

## 2024-12-01 RX ADMIN — LORAZEPAM 1 MG: 2 INJECTION INTRAMUSCULAR; INTRAVENOUS at 05:12

## 2024-12-01 RX ADMIN — ALPRAZOLAM 1 MG: 0.25 TABLET ORAL at 03:12

## 2024-12-01 RX ADMIN — IOHEXOL 75 ML: 350 INJECTION, SOLUTION INTRAVENOUS at 03:12

## 2024-12-01 RX ADMIN — ONDANSETRON 4 MG: 2 INJECTION INTRAMUSCULAR; INTRAVENOUS at 03:12

## 2024-12-01 RX ADMIN — ONDANSETRON 4 MG: 2 INJECTION INTRAMUSCULAR; INTRAVENOUS at 02:12

## 2024-12-01 NOTE — PROVIDER PROGRESS NOTES - EMERGENCY DEPT.
"Encounter Date: 12/1/2024    ED Physician Progress Notes        ED STAFF PHYSICIAN HAND-OFF NOTE:  6:10 AM 12/1/2024  Rosmery Woodard is a 64 y.o. female who presented to the ED on 12/1/2024 and has been managed by , who reports patient C/O AP. I assumed care of patient from off-going ED physician team at 6:10 AM pending PO challenge .    On my evaluation, Rosmery Woodard appears well, hemodynamically stable and in NAD. Thus far, Rosmery Woodard has received:  Medications   ondansetron injection 4 mg (4 mg Intravenous Given 12/1/24 0215)   ondansetron injection 4 mg (4 mg Intravenous Given 12/1/24 0303)   ALPRAZolam tablet 1 mg (1 mg Oral Given 12/1/24 0300)   iohexoL (OMNIPAQUE 350) injection 75 mL (75 mLs Intravenous Given 12/1/24 0343)   LORazepam injection 1 mg (1 mg Intravenous Given 12/1/24 0528)       On my exam, I appreciate:  BP (!) 155/91   Pulse 69   Temp 97.4 °F (36.3 °C) (Oral)   Resp 16   Ht 5' 3" (1.6 m)   Wt 76.7 kg (169 lb)   SpO2 96%   Breastfeeding No   BMI 29.94 kg/m²   Constitutional: Well-appearing; Well-Nourished; Non-Toxic-appearing   Head/Face: AT/NC & No Facial asymmetry.  Oropharynx: Speaking Full Sentences with No drooling or slurring  Pulmonary/Chest:  No acute respiratory distress      ED Course as of 12/01/24 0610   Sun Dec 01, 2024   0308 CBC W/ AUTO DIFFERENTIAL(!)  CBC unremarkable, no leukocytosis, thrombocytopenia, anemia [HJ]   0439 Comp. Metabolic Panel(!)  CMP largely unremarkable [HJ]   0439 Lipase: 17  Less likely pancreatitis [HJ]   0520 Upon re-evaluation patient is still nauseous and anxious.  We will give 1 mg of IV Ativan [HJ]   0535 Even if patient is [HJ]      ED Course User Index  [HJ] Tim Polanco MD       Disposition:  Patient reportedly tolerated a cup of water without reports of nausea and vomiting. Therefore patient was discharged and recommended to follow up outpatient with her PCP ______________________  Jaleesa Nesbitt MD  Emergency Medicine " Staff  6:10 AM 12/1/2024

## 2024-12-01 NOTE — ED NOTES
I-STAT Chem-8+ Results:   Value Reference Range   Sodium 141 136-145 mmol/L   Potassium  3.2 3.5-5.1 mmol/L   Chloride 98  mmol/L   Ionized Calcium 1.1 1.06-1.42 mmol/L   CO2 (measured) 30 23-29 mmol/L   Glucose 117  mg/dL   BUN 14 6-30 mg/dL   Creatinine 1.1 0.5-1.4 mg/dL   Hematocrit 38 36-54%

## 2024-12-01 NOTE — DISCHARGE INSTRUCTIONS
You presented to the emergency department with complaints of abdominal pain.  Your lab work and imaging of your abdomen was negative for processes that requires emergent intervention.    Please follow-up with your primary care doctor after being seen in the emergency department for follow up assessment.  Please present back to the emergency department if your symptoms worsen.

## 2024-12-01 NOTE — ED TRIAGE NOTES
"Rosmery Woodard, a 64 y.o. female presents to the ED w/ complaint of nausea and vomiting since yesterday afternoon. Pt recently prescribed "a form of amlodipine" and states that this always makes her vomit. Pt endorses 10/10 LLQ pain that started 2 weeks ago, pt reports that she has diverticulitis. Pt had a colonscopy last week, follows up with GI in three weeks.  Pt is AAOx4, GCS 15.     Triage note:  Chief Complaint   Patient presents with    Abdominal Pain     Abdominal pain, recently started on new BP med and states this always happens when she's started on it. +Nausea/vomiting.      Review of patient's allergies indicates:   Allergen Reactions    Versed [midazolam] Nausea And Vomiting    Bentyl [dicyclomine] Hives    Darvocet a500 [propoxyphene n-acetaminophen] Hives    Toradol [ketorolac] Hives    Morphine Nausea And Vomiting     12/7/22 @ 0935 Pt, stated "I take Dilaudid without any reactions to it" pt also states that she "can take oxycodone for pain."      Past Medical History:   Diagnosis Date    Asthma     Hypertension     Liver abscess     hx    Portal vein thrombosis 10/2013    Portal vein thrombosis 10/2013    Ulcerative colitis 9/15/2013       "

## 2024-12-01 NOTE — ED PROVIDER NOTES
"Encounter Date: 2024       History     Chief Complaint   Patient presents with    Abdominal Pain     Abdominal pain, recently started on new BP med and states this always happens when she's started on it. +Nausea/vomiting.      Patient is a 64-year-old female with a past medical history of ulcerative colitis, hypertension that presents to the emergency department for abdominal pain, nausea and vomiting.  She states that she started a new calcium channel blocker today and started having lower quadrant abdominal pain, nausea vomiting.  Vomit was nonbilious nonbloody.  Denies diarrhea or bloody stool.  She states that she had a similar reaction when she was 1st started on generic amlodipine.  Patient also states that she had similar pain with a UC flare up in the past.  Patient seems to be an unreliable historian.        Review of patient's allergies indicates:   Allergen Reactions    Versed [midazolam] Nausea And Vomiting    Bentyl [dicyclomine] Hives    Darvocet a500 [propoxyphene n-acetaminophen] Hives    Toradol [ketorolac] Hives    Morphine Nausea And Vomiting     22 @ 0935 Pt, stated "I take Dilaudid without any reactions to it" pt also states that she "can take oxycodone for pain."      Past Medical History:   Diagnosis Date    Asthma     Hypertension     Liver abscess     hx    Portal vein thrombosis 10/2013    Portal vein thrombosis 10/2013    Ulcerative colitis 9/15/2013     Past Surgical History:   Procedure Laterality Date    ABDOMINAL SURGERY      3 exploratory surgeries    APPENDECTOMY       SECTION, LOW TRANSVERSE      3 C-sections    CHOLECYSTECTOMY      COLONOSCOPY      COLONOSCOPY N/A 2016    Procedure: COLONOSCOPY;  Surgeon: Brad King MD;  Location: [facility] ([address] [place] [address]);  Service: Endoscopy;  Laterality: N/A;    EXPLORATORY LAPAROTOMY W/ BOWEL RESECTION      HERNIA REPAIR      HYSTERECTOMY      INCISIONAL HERNIA REPAIR  2014    UPPER GASTROINTESTINAL ENDOSCOPY   "    URETERAL STENT PLACEMENT Left 2022    Procedure: INSERTION, STENT, URETER;  Surgeon: Mann Geronimo MD;  Location: [facility];  Service: Urology;  Laterality: Left;     Family History   Problem Relation Name Age of Onset    Hypertension Maternal Grandmother      Cirrhosis Maternal Aunt      Breast cancer Maternal Aunt      Colon cancer Maternal Uncle      Breast cancer Cousin      Celiac disease Neg Hx      Colon polyps Neg Hx      Crohn's disease Neg Hx      Cystic fibrosis Neg Hx      Esophageal cancer Neg Hx      Hemochromatosis Neg Hx      Inflammatory bowel disease Neg Hx      Irritable bowel syndrome Neg Hx      Liver cancer Neg Hx      Liver disease Neg Hx      Rectal cancer Neg Hx      Stomach cancer Neg Hx      Ulcerative colitis Neg Hx      Scott's disease Neg Hx       Social History     Tobacco Use    Smoking status: Former     Current packs/day: 0.00     Average packs/day: 0.1 packs/day for 0.5 years     Types: Cigarettes     Start date: 3/16/1976     Quit date: 1976     Years since quittin.2    Smokeless tobacco: Never   Substance Use Topics    Alcohol use: Yes     Alcohol/week: 1.0 standard drink of alcohol     Types: 1 Glasses of wine per week     Comment: rare    Drug use: No     Review of Systems    Physical Exam     Initial Vitals [24 0135]   BP Pulse Resp Temp SpO2   (!) 155/91 69 16 97.4 °F (36.3 °C) 96 %      MAP       --         Physical Exam    ED Course   Procedures  Labs Reviewed   CBC W/ AUTO DIFFERENTIAL - Abnormal       Result Value    WBC 10.18      RBC 4.44      Hemoglobin 12.7      Hematocrit 37.6      MCV 85      MCH 28.6      MCHC 33.8      RDW 13.9      Platelets 480 (*)     MPV 10.6      Immature Granulocytes 0.3      Gran # (ANC) 7.0      Immature Grans (Abs) 0.03      Lymph # 2.6      Mono # 0.4      Eos # 0.1      Baso # 0.05      nRBC 0      Gran % 68.7      Lymph % 25.8      Mono % 4.0      Eosinophil % 0.7      Basophil % 0.5      Differential Method  Automated     COMPREHENSIVE METABOLIC PANEL - Abnormal    Sodium 139      Potassium 3.8      Chloride 100      CO2 26      Glucose 106      BUN 13      Creatinine 1.1      Calcium 9.4      Total Protein 8.6 (*)     Albumin 3.8      Total Bilirubin 0.3      Alkaline Phosphatase 77      AST 28      ALT 17      eGFR 56.1 (*)     Anion Gap 13     URINALYSIS, REFLEX TO URINE CULTURE - Abnormal    Specimen UA Urine, Clean Catch      Color, UA Yellow      Appearance, UA Hazy (*)     pH, UA 7.0      Specific Gravity, UA 1.025      Protein, UA 1+ (*)     Glucose, UA Negative      Ketones, UA Negative      Bilirubin (UA) Negative      Occult Blood UA Negative      Nitrite, UA Negative      Leukocytes, UA 1+ (*)     Narrative:     Specimen Source->Urine   ISTAT PROCEDURE - Abnormal    POC Glucose 117 (*)     POC BUN 14      POC Creatinine 1.1      POC Sodium 141      POC Potassium 3.2 (*)     POC Chloride 98      POC TCO2 (MEASURED) 30 (*)     POC Ionized Calcium 1.10      POC Hematocrit 38      Sample ISAAC     LIPASE    Lipase 17     URINALYSIS MICROSCOPIC    RBC, UA 4      WBC, UA 2      Bacteria Rare      Squam Epithel, UA 0      Hyaline Casts, UA 0      Microscopic Comment SEE COMMENT      Narrative:     Specimen Source->Urine   ISTAT CHEM8          Imaging Results              CT Abdomen Pelvis With IV Contrast NO Oral Contrast (Final result)  Result time 12/01/24 06:04:32      Final result by Johny Samuel MD (12/01/24 06:04:32)                   Impression:      No acute abnormality in the abdomen or pelvis.    Small hiatal hernia.    Mild mucosal hyperenhancement in the distal esophagus and proximal stomach, possibly representing esophagitis and gastritis.  The enhancement in this region appears increased since the last contrast enhanced abdominal CT of 06/26/2021.    Additional observations as detailed in the body of the report.    Electronically signed by resident: Bukc  Shaun  Date:    12/01/2024  Time:    04:30    Electronically signed by: Johny Samuel  Date:    12/01/2024  Time:    06:04               Narrative:    EXAMINATION:  CT ABDOMEN PELVIS WITH IV CONTRAST    CLINICAL HISTORY:  Abdominal pain, acute, nonlocalized;    TECHNIQUE:  Low dose axial images, sagittal and coronal reformations were obtained from the lung bases to the pubic symphysis following the IV administration of 75 mL of Omnipaque 350 intravenous contrast. Oral contrast was not administered.    COMPARISON:  CT renal stone study 01/19/2023    CT abdomen pelvis with IV contrast 06/26/2021    CT abdomen pelvis, noncontrast, 06/18/2012    FINDINGS:  Lungs: Mild dependent atelectasis in the lung bases.  No consolidation or pleural effusion in the visualized lung bases.  Mild dorsal pleural thickening in the right lower hemithorax, present since at least 06/08/2012, and only minimally increased in the 12 year interval since then.    Heart: Normal size.    Liver: Normal size.  No focal abnormality. .    Gallbladder: Cholecystectomy.    Bile ducts: No intrahepatic ductal dilation.  Common bile duct is mildly dilated as can be seen post cholecystectomy.    Spleen: Normal size. No focal abnormality.    Pancreas: No mass. No peripancreatic fat stranding.    Adrenals: No significant abnormality    Renal/Ureters: The kidneys are normal in size . No stones.  No focal abnormalities.  No hydroureteronephrosis. The urinary bladder is decompressed limiting evaluation..    Reproductive: Hysterectomy.    Stomach/Bowel: Small hiatal hernia. Mild mucosal hyperenhancement in the distal esophagus and proximal stomach, possibly representing esophagitis and gastritis.    The gastric antrum and pylorus are poorly distended, limiting CT assessment for abnormal mural or mucosal thickening in this region.    Small bowel is normal caliber without obstruction or inflammation. Appendix is not identified.  Large bowel is nondilated,  without scan evidence of obstruction or inflammation. Diverticulosis coli.  The sigmoid colon is poorly distended, making it difficult to exclude some wall thickening in this region, as could be seen with chronic sigmoid diverticulitis    Peritoneum: No free fluid. No intraperitoneal free air.    Lymph Nodes: No pathologic luis enlargement in the abdomen or pelvis.    Vasculature: Abdominal aorta tapers normally.  Mild calcific atherosclerosis of the abdominal aorta and its branches. Portal vasculature, SMV, and splenic vein are patent.    Bones: Age-appropriate degenerative changes of the spine.  No acute fractures or osseous destructive lesions.    Soft Tissues: Biopsy clip in the right breast                                       Medications   ondansetron injection 4 mg (4 mg Intravenous Given 12/1/24 0215)   ondansetron injection 4 mg (4 mg Intravenous Given 12/1/24 0303)   ALPRAZolam tablet 1 mg (1 mg Oral Given 12/1/24 0300)   iohexoL (OMNIPAQUE 350) injection 75 mL (75 mLs Intravenous Given 12/1/24 0343)   LORazepam injection 1 mg (1 mg Intravenous Given 12/1/24 0528)     Medical Decision Making  Rosmery Woodard is a 64 y.o. female presenting to the ED with abdominal pain. History and physical exam as above. Initial vital signs stable and non-actionable. Initial work-up to include: Labs (CBC, CMP, Lipase, UA,), Imaging (CT A/P with contrast,), EKG,    Differential diagnosis considered for this patient includes, but is not limited to: gastritis, medication adverse reaction, anxiety.  Other severe, more emergent diagnoses considered, but deemed much less likely, to include: intraabdominal infection such as abscess.    Labs are reassuring.  CT scan showed concern for gastritis.  Patient will be signed out to oncoming physician team pending symptom control and p.o. challenge.  She will likely be discharged home    Amount and/or Complexity of Data Reviewed  Labs: ordered. Decision-making details documented in ED  Course.  Radiology: ordered.    Risk  Prescription drug management.               ED Course as of 12/01/24 0615   Sun Dec 01, 2024   0308 CBC W/ AUTO DIFFERENTIAL(!)  CBC unremarkable, no leukocytosis, thrombocytopenia, anemia [HJ]   0439 Comp. Metabolic Panel(!)  CMP largely unremarkable [HJ]   0439 Lipase: 17  Less likely pancreatitis [HJ]   0520 Upon re-evaluation patient is still nauseous and anxious.  We will give 1 mg of IV Ativan [HJ]   0535 Even if patient is [HJ]      ED Course User Index  [HJ] Tim Polanco MD                             Clinical Impression:  Final diagnoses:  [R11.2] Nausea and vomiting, unspecified vomiting type (Primary)  [R10.9] Abdominal pain, unspecified abdominal location  [F41.9] Anxiety                 Tim Polanco MD  Resident  12/01/24 0615

## 2025-02-21 ENCOUNTER — HOSPITAL ENCOUNTER (EMERGENCY)
Facility: HOSPITAL | Age: 65
Discharge: HOME | End: 2025-02-21
Attending: EMERGENCY MEDICINE
Payer: MEDICARE

## 2025-02-21 VITALS
DIASTOLIC BLOOD PRESSURE: 70 MMHG | HEART RATE: 80 BPM | RESPIRATION RATE: 16 BRPM | HEIGHT: 63 IN | WEIGHT: 158 LBS | TEMPERATURE: 98 F | SYSTOLIC BLOOD PRESSURE: 125 MMHG | OXYGEN SATURATION: 99 % | BODY MASS INDEX: 28 KG/M2

## 2025-02-21 DIAGNOSIS — I10 HYPERTENSION, UNSPECIFIED TYPE: ICD-10-CM

## 2025-02-21 DIAGNOSIS — G89.18 POST-OP PAIN: Primary | ICD-10-CM

## 2025-02-21 PROCEDURE — 99283 EMERGENCY DEPT VISIT LOW MDM: CPT

## 2025-02-21 PROCEDURE — 25000003 PHARM REV CODE 250: Performed by: EMERGENCY MEDICINE

## 2025-02-21 RX ORDER — OXYCODONE AND ACETAMINOPHEN 5; 325 MG/1; MG/1
1 TABLET ORAL EVERY 6 HOURS PRN
Qty: 12 TABLET | Refills: 0 | Status: SHIPPED | OUTPATIENT
Start: 2025-02-21

## 2025-02-21 RX ORDER — OXYCODONE HYDROCHLORIDE 5 MG/1
5 TABLET ORAL
Refills: 0 | Status: COMPLETED | OUTPATIENT
Start: 2025-02-21 | End: 2025-02-21

## 2025-02-21 RX ADMIN — OXYCODONE 5 MG: 5 TABLET ORAL at 12:02

## 2025-02-21 NOTE — DISCHARGE INSTRUCTIONS
Please contact your surgeon tomorrow morning for follow-up  Please seek care if high fevers or worse swelling

## 2025-02-21 NOTE — ED PROVIDER NOTES
"Encounter Date: 2025       History     Chief Complaint   Patient presents with    Post-op Problem     Pt states breast reduction surgery on  and is complaining of pain to both of her breasts and states unable to lift her arms up without pain. Pt states contacting her surgeon and as told to wait till Monday      65-year-old female presents with pain to her surgical sites.  She states she had breast reduction surgery on the .  She has persistent pain.        Review of patient's allergies indicates:   Allergen Reactions    Versed [midazolam] Nausea And Vomiting    Bentyl [dicyclomine] Hives    Darvocet a500 [propoxyphene n-acetaminophen] Hives    Toradol [ketorolac] Hives    Morphine Nausea And Vomiting     22 @ 0935 Pt, stated "I take Dilaudid without any reactions to it" pt also states that she "can take oxycodone for pain."      Past Medical History:   Diagnosis Date    Asthma     Hypertension     Liver abscess     hx    Portal vein thrombosis 10/2013    Portal vein thrombosis 10/2013    Ulcerative colitis 9/15/2013     Past Surgical History:   Procedure Laterality Date    ABDOMINAL SURGERY      3 exploratory surgeries    APPENDECTOMY       SECTION, LOW TRANSVERSE      3 C-sections    CHOLECYSTECTOMY      COLONOSCOPY      COLONOSCOPY N/A 2016    Procedure: COLONOSCOPY;  Surgeon: Brad King MD;  Location: [facility] ([address] [place] [address]);  Service: Endoscopy;  Laterality: N/A;    EXPLORATORY LAPAROTOMY W/ BOWEL RESECTION      HERNIA REPAIR      HYSTERECTOMY      INCISIONAL HERNIA REPAIR      UPPER GASTROINTESTINAL ENDOSCOPY      URETERAL STENT PLACEMENT Left 2022    Procedure: INSERTION, STENT, URETER;  Surgeon: Mann Geronimo MD;  Location: [facility];  Service: Urology;  Laterality: Left;     Family History   Problem Relation Name Age of Onset    Hypertension Maternal Grandmother      Cirrhosis Maternal Aunt      Breast cancer Maternal Aunt      Colon cancer Maternal Uncle   "    Breast cancer Cousin      Celiac disease Neg Hx      Colon polyps Neg Hx      Crohn's disease Neg Hx      Cystic fibrosis Neg Hx      Esophageal cancer Neg Hx      Hemochromatosis Neg Hx      Inflammatory bowel disease Neg Hx      Irritable bowel syndrome Neg Hx      Liver cancer Neg Hx      Liver disease Neg Hx      Rectal cancer Neg Hx      Stomach cancer Neg Hx      Ulcerative colitis Neg Hx      Scott's disease Neg Hx       Social History[1]  Review of Systems    Physical Exam     Initial Vitals [02/21/25 0000]   BP Pulse Resp Temp SpO2   133/74 97 18 97.7 °F (36.5 °C) 98 %      MAP       --         Physical Exam    Constitutional:   Uncomfortable   Pulmonary/Chest:   Examination of the breasts reveal postop changes.  There is mild swelling but no erythema.  No drainage or fluctuance.  I examined both breasts and axilla           ED Course   Procedures  Labs Reviewed   HEPATITIS C ANTIBODY   HIV 1 / 2 ANTIBODY          Imaging Results    None          Medications   oxyCODONE immediate release tablet 5 mg (5 mg Oral Given 2/21/25 0027)     Medical Decision Making  Patient with persistent postop pain.  I find no sign of infection.  No diff wound dehiscence.  Will provide a short course of narcotic for pain relief.  Recommend she contact her surgeon for close follow up.  She asked for referral to a primary care doctor here which I placed.    Risk  Prescription drug management.                                      Clinical Impression:  Final diagnoses:  [G89.18] Post-op pain (Primary)  [I10] Hypertension, unspecified type          ED Disposition Condition    Discharge           ED Prescriptions       Medication Sig Dispense Start Date End Date Auth. Provider    oxyCODONE-acetaminophen (PERCOCET) 5-325 mg per tablet Take 1 tablet by mouth every 6 (six) hours as needed for Pain. 12 tablet 2/21/2025 -- Serge Israel MD          Follow-up Information       Follow up With Specialties Details Why Contact Info  Additional Information    [facility] Internal Medicine Schedule an appointment as soon as possible for a visit in 2 days For Primary Care and Emergency Department Follow-up [address]  [facility] [id number] [phone] [facility] Please park in surface lot and check in at central registration desk                 [1]   Social History  Tobacco Use    Smoking status: Former     Current packs/day: 0.00     Average packs/day: 0.1 packs/day for 0.5 years     Types: Cigarettes     Start date: 3/16/1976     Quit date: 1976     Years since quittin.4    Smokeless tobacco: Never   Substance Use Topics    Alcohol use: Yes     Alcohol/week: 1.0 standard drink of alcohol     Types: 1 Glasses of wine per week     Comment: rare    Drug use: No        Serge Israel MD  25 [phone]

## 2025-02-21 NOTE — ED TRIAGE NOTES
"Rosmery Woodard, a 65 y.o. female presents to the ED w/ complaint of bilateral breast pain. Pt states she had her surgery on 02/04 and has been sore. Pt states her chest muscles feel too tight and she can't not lift her arms above her head. Pt states she called her surgeon who said she would see her next week, pt states she does not agree with that.     Triage note:  Chief Complaint   Patient presents with    Post-op Problem     Pt states breast reduction surgery on 2/4 and is complaining of pain to both of her breasts and states unable to lift her arms up without pain. Pt states contacting her surgeon and as told to wait till Monday      Review of patient's allergies indicates:   Allergen Reactions    Versed [midazolam] Nausea And Vomiting    Bentyl [dicyclomine] Hives    Darvocet a500 [propoxyphene n-acetaminophen] Hives    Toradol [ketorolac] Hives    Morphine Nausea And Vomiting     12/7/22 @ 0935 Pt, stated "I take Dilaudid without any reactions to it" pt also states that she "can take oxycodone for pain."      Past Medical History:   Diagnosis Date    Asthma     Hypertension     Liver abscess     hx    Portal vein thrombosis 10/2013    Portal vein thrombosis 10/2013    Ulcerative colitis 9/15/2013       "

## 2025-04-27 ENCOUNTER — HOSPITAL ENCOUNTER (EMERGENCY)
Facility: OTHER | Age: 65
Discharge: HOME | End: 2025-04-27
Payer: MEDICARE

## 2025-04-27 VITALS
SYSTOLIC BLOOD PRESSURE: 137 MMHG | HEIGHT: 63 IN | HEART RATE: 68 BPM | WEIGHT: 160 LBS | OXYGEN SATURATION: 99 % | RESPIRATION RATE: 20 BRPM | DIASTOLIC BLOOD PRESSURE: 73 MMHG | BODY MASS INDEX: 28.35 KG/M2 | TEMPERATURE: 98 F

## 2025-04-27 DIAGNOSIS — F13.930 BENZODIAZEPINE WITHDRAWAL WITHOUT COMPLICATION: Primary | ICD-10-CM

## 2025-04-27 DIAGNOSIS — R00.0 TACHYCARDIA: ICD-10-CM

## 2025-04-27 DIAGNOSIS — F41.9 ANXIETY: ICD-10-CM

## 2025-04-27 LAB
ABSOLUTE EOSINOPHIL (OHS): 0.09 K/UL
ABSOLUTE MONOCYTE (OHS): 0.5 K/UL (ref 0.3–1)
ABSOLUTE NEUTROPHIL COUNT (OHS): 5.63 K/UL (ref 1.8–7.7)
ALBUMIN SERPL BCP-MCNC: 3.7 G/DL (ref 3.5–5.2)
ALP SERPL-CCNC: 63 UNIT/L (ref 40–150)
ALT SERPL W/O P-5'-P-CCNC: 13 UNIT/L (ref 10–44)
ANION GAP (OHS): 11 MMOL/L (ref 8–16)
AST SERPL-CCNC: 26 UNIT/L (ref 11–45)
BASOPHILS # BLD AUTO: 0.08 K/UL
BASOPHILS NFR BLD AUTO: 0.8 %
BILIRUB SERPL-MCNC: 0.6 MG/DL (ref 0.1–1)
BILIRUB UR QL STRIP.AUTO: NEGATIVE
BUN SERPL-MCNC: 15 MG/DL (ref 8–23)
CALCIUM SERPL-MCNC: 9.1 MG/DL (ref 8.7–10.5)
CHLORIDE SERPL-SCNC: 109 MMOL/L (ref 95–110)
CLARITY UR: CLEAR
CO2 SERPL-SCNC: 20 MMOL/L (ref 23–29)
COLOR UR AUTO: YELLOW
CREAT SERPL-MCNC: 0.9 MG/DL (ref 0.5–1.4)
ERYTHROCYTE [DISTWIDTH] IN BLOOD BY AUTOMATED COUNT: 14.8 % (ref 11.5–14.5)
GFR SERPLBLD CREATININE-BSD FMLA CKD-EPI: >60 ML/MIN/1.73/M2
GLUCOSE SERPL-MCNC: 98 MG/DL (ref 70–110)
GLUCOSE UR QL STRIP: NEGATIVE
HCT VFR BLD AUTO: 37.3 % (ref 37–48.5)
HCV AB SERPL QL IA: NEGATIVE
HGB BLD-MCNC: 12.6 GM/DL (ref 12–16)
HGB UR QL STRIP: NEGATIVE
HIV 1+2 AB+HIV1 P24 AG SERPL QL IA: NEGATIVE
HOLD SPECIMEN: NORMAL
IMM GRANULOCYTES # BLD AUTO: 0.02 K/UL (ref 0–0.04)
IMM GRANULOCYTES NFR BLD AUTO: 0.2 % (ref 0–0.5)
KETONES UR QL STRIP: ABNORMAL
LEUKOCYTE ESTERASE UR QL STRIP: NEGATIVE
LYMPHOCYTES # BLD AUTO: 3.75 K/UL (ref 1–4.8)
MAGNESIUM SERPL-MCNC: 1.8 MG/DL (ref 1.6–2.6)
MCH RBC QN AUTO: 27.6 PG (ref 27–31)
MCHC RBC AUTO-ENTMCNC: 33.8 G/DL (ref 32–36)
MCV RBC AUTO: 82 FL (ref 82–98)
MICROSCOPIC COMMENT: NORMAL
NITRITE UR QL STRIP: NEGATIVE
NUCLEATED RBC (/100WBC) (OHS): 0 /100 WBC
PH UR STRIP: 7 [PH]
PHOSPHATE SERPL-MCNC: 3.1 MG/DL (ref 2.7–4.5)
PLATELET # BLD AUTO: 466 K/UL (ref 150–450)
PMV BLD AUTO: 11.1 FL (ref 9.2–12.9)
POTASSIUM SERPL-SCNC: 3.9 MMOL/L (ref 3.5–5.1)
PROT SERPL-MCNC: 8.4 GM/DL (ref 6–8.4)
PROT UR QL STRIP: ABNORMAL
RBC # BLD AUTO: 4.56 M/UL (ref 4–5.4)
RBC #/AREA URNS AUTO: 2 /HPF (ref 0–4)
RELATIVE EOSINOPHIL (OHS): 0.9 %
RELATIVE LYMPHOCYTE (OHS): 37.2 % (ref 18–48)
RELATIVE MONOCYTE (OHS): 5 % (ref 4–15)
RELATIVE NEUTROPHIL (OHS): 55.9 % (ref 38–73)
SODIUM SERPL-SCNC: 140 MMOL/L (ref 136–145)
SP GR UR STRIP: 1.03
SQUAMOUS #/AREA URNS AUTO: 3 /HPF
UROBILINOGEN UR STRIP-ACNC: NEGATIVE EU/DL
WBC # BLD AUTO: 10.07 K/UL (ref 3.9–12.7)
WBC #/AREA URNS AUTO: 3 /HPF (ref 0–5)

## 2025-04-27 PROCEDURE — 99284 EMERGENCY DEPT VISIT MOD MDM: CPT | Mod: 25

## 2025-04-27 PROCEDURE — 87389 HIV-1 AG W/HIV-1&-2 AB AG IA: CPT

## 2025-04-27 PROCEDURE — 84100 ASSAY OF PHOSPHORUS: CPT

## 2025-04-27 PROCEDURE — 81001 URINALYSIS AUTO W/SCOPE: CPT

## 2025-04-27 PROCEDURE — 86803 HEPATITIS C AB TEST: CPT

## 2025-04-27 PROCEDURE — 63600175 PHARM REV CODE 636 W HCPCS

## 2025-04-27 PROCEDURE — 25000003 PHARM REV CODE 250

## 2025-04-27 PROCEDURE — 93010 ELECTROCARDIOGRAM REPORT: CPT | Mod: ,,, | Performed by: INTERNAL MEDICINE

## 2025-04-27 PROCEDURE — 93005 ELECTROCARDIOGRAM TRACING: CPT

## 2025-04-27 PROCEDURE — 85025 COMPLETE CBC W/AUTO DIFF WBC: CPT

## 2025-04-27 PROCEDURE — 96361 HYDRATE IV INFUSION ADD-ON: CPT

## 2025-04-27 PROCEDURE — 83735 ASSAY OF MAGNESIUM: CPT

## 2025-04-27 PROCEDURE — 96374 THER/PROPH/DIAG INJ IV PUSH: CPT

## 2025-04-27 PROCEDURE — 80053 COMPREHEN METABOLIC PANEL: CPT

## 2025-04-27 RX ORDER — LORAZEPAM 1 MG/1
1 TABLET ORAL
Status: DISCONTINUED | OUTPATIENT
Start: 2025-04-27 | End: 2025-04-27 | Stop reason: HOSPADM

## 2025-04-27 RX ORDER — LORAZEPAM 2 MG/ML
2 INJECTION INTRAMUSCULAR
Status: COMPLETED | OUTPATIENT
Start: 2025-04-27 | End: 2025-04-27

## 2025-04-27 RX ORDER — ALPRAZOLAM 0.5 MG/1
0.5 TABLET ORAL 2 TIMES DAILY
Qty: 14 TABLET | Refills: 0 | Status: CANCELLED | OUTPATIENT
Start: 2025-04-27 | End: 2025-05-04

## 2025-04-27 RX ORDER — ALPRAZOLAM 1 MG/1
1 TABLET ORAL 2 TIMES DAILY
Qty: 14 TABLET | Refills: 0 | Status: SHIPPED | OUTPATIENT
Start: 2025-04-27 | End: 2025-05-04

## 2025-04-27 RX ADMIN — SODIUM CHLORIDE 1000 ML: 9 INJECTION, SOLUTION INTRAVENOUS at 06:04

## 2025-04-27 RX ADMIN — LORAZEPAM 2 MG: 2 INJECTION INTRAMUSCULAR; INTRAVENOUS at 03:04

## 2025-04-27 NOTE — ED PROVIDER NOTES
"Encounter Date: 2025    SCRIBE #1 NOTE: I, Chun Morales, am scribing for, and in the presence of,  Meena Calix MD. I have scribed the following portions of the note - Other sections scribed: HPI, MDM.       History     Chief Complaint   Patient presents with    Multiple complaints     Pt states she has a metal taste in her mouth, her bones ache, states she lost her xanax last week and feels like she's going through withdrawals. Pt tremulous in triage and appears very anxious     65 y.o. female who presents to the ED complaining of tremors and severe anxiety. She reports that approximately one week ago she abruptly stopped her Xanax and since then has increasing anxiety and now has tremors and not feeling well. She states that she has been on Xanax for many years, but recently found out that this class of medications can be addicting so she became concerned. She states that she decided to stop taking the medications abruptly and was unaware that she should taper off the medications. She endorses having nausea, constipation, and metallic taste in her mouth.          The history is provided by the patient.     Review of patient's allergies indicates:   Allergen Reactions    Versed [midazolam] Nausea And Vomiting    Bentyl [dicyclomine] Hives    Darvocet a500 [propoxyphene n-acetaminophen] Hives    Toradol [ketorolac] Hives    Morphine Nausea And Vomiting     22 @ 0935 Pt, stated "I take Dilaudid without any reactions to it" pt also states that she "can take oxycodone for pain."      Past Medical History:   Diagnosis Date    Asthma     Hypertension     Liver abscess     hx    Portal vein thrombosis 10/2013    Portal vein thrombosis 10/2013    Ulcerative colitis 9/15/2013     Past Surgical History:   Procedure Laterality Date    ABDOMINAL SURGERY      3 exploratory surgeries    APPENDECTOMY       SECTION, LOW TRANSVERSE      3 C-sections    CHOLECYSTECTOMY      COLONOSCOPY      COLONOSCOPY N/A " 2/26/2016    Procedure: COLONOSCOPY;  Surgeon: Brad King MD;  Location: [facility] ([address] [place] [address]);  Service: Endoscopy;  Laterality: N/A;    EXPLORATORY LAPAROTOMY W/ BOWEL RESECTION      HERNIA REPAIR      HYSTERECTOMY      INCISIONAL HERNIA REPAIR  2014    UPPER GASTROINTESTINAL ENDOSCOPY  2015    URETERAL STENT PLACEMENT Left 12/7/2022    Procedure: INSERTION, STENT, URETER;  Surgeon: Mann Geronimo MD;  Location: [facility] OR;  Service: Urology;  Laterality: Left;     Family History   Problem Relation Name Age of Onset    Hypertension Maternal Grandmother      Cirrhosis Maternal Aunt      Breast cancer Maternal Aunt      Colon cancer Maternal Uncle      Breast cancer Cousin      Celiac disease Neg Hx      Colon polyps Neg Hx      Crohn's disease Neg Hx      Cystic fibrosis Neg Hx      Esophageal cancer Neg Hx      Hemochromatosis Neg Hx      Inflammatory bowel disease Neg Hx      Irritable bowel syndrome Neg Hx      Liver cancer Neg Hx      Liver disease Neg Hx      Rectal cancer Neg Hx      Stomach cancer Neg Hx      Ulcerative colitis Neg Hx      Scott's disease Neg Hx       Social History[1]      Physical Exam     Initial Vitals [04/27/25 1448]   BP Pulse Resp Temp SpO2   (!) 145/92 102 (!) 22 98 °F (36.7 °C) 99 %      MAP       --         Physical Exam    Nursing note and vitals reviewed.  Constitutional: She is not diaphoretic. She appears distressed.   HENT:   Head: Normocephalic and atraumatic.   Eyes: Conjunctivae are normal.   Cardiovascular:  Regular rhythm.           Tachycardic   Pulmonary/Chest: No respiratory distress. She has no wheezes. She has no rhonchi. She has no rales.   Abdominal: Abdomen is soft. She exhibits no distension. There is no abdominal tenderness. There is no rebound and no guarding.   Musculoskeletal:         General: No edema. Normal range of motion.     Neurological: She is alert.   Skin: Skin is warm and dry.   Psychiatric: Thought content normal.   Severely anxious,  stuttering speech, tremulous  Denies SI or HI         ED Course   Procedures  Labs Reviewed   COMPREHENSIVE METABOLIC PANEL - Abnormal       Result Value    Sodium 140      Potassium 3.9      Chloride 109      CO2 20 (*)     Glucose 98      BUN 15      Creatinine 0.9      Calcium 9.1      Protein Total 8.4      Albumin 3.7      Bilirubin Total 0.6      ALP 63      AST 26      ALT 13      Anion Gap 11      eGFR >60     URINALYSIS, REFLEX TO URINE CULTURE - Abnormal    Color, UA Yellow      Appearance, UA Clear      pH, UA 7.0      Spec Grav UA 1.030      Protein, UA 1+ (*)     Glucose, UA Negative      Ketones, UA 1+ (*)     Bilirubin, UA Negative      Blood, UA Negative      Nitrites, UA Negative      Urobilinogen, UA Negative      Leukocyte Esterase, UA Negative     CBC WITH DIFFERENTIAL - Abnormal    WBC 10.07      RBC 4.56      HGB 12.6      HCT 37.3      MCV 82      MCH 27.6      MCHC 33.8      RDW 14.8 (*)     Platelet Count 466 (*)     MPV 11.1      Nucleated RBC 0      Neut % 55.9      Lymph % 37.2      Mono % 5.0      Eos % 0.9      Basophil % 0.8      Imm Grans % 0.2      Neut # 5.63      Lymph # 3.75      Mono # 0.50      Eos # 0.09      Baso # 0.08      Imm Grans # 0.02     MAGNESIUM - Normal    Magnesium  1.8     PHOSPHORUS - Normal    Phosphorus Level 3.1     HEPATITIS C ANTIBODY - Normal    Hep C Ab Interp Negative     HIV 1 / 2 ANTIBODY - Normal    HIV 1/2 Ag/Ab Negative     CBC W/ AUTO DIFFERENTIAL    Narrative:     The following orders were created for panel order CBC auto differential.  Procedure                               Abnormality         Status                     ---------                               -----------         ------                     CBC with Differential[[id number]]       Abnormal            Final result                 Please view results for these tests on the individual orders.   GREY TOP URINE HOLD    Extra Tube Hold for add-ons.     URINALYSIS MICROSCOPIC    RBC, UA 2       WBC, UA 3      Squamous Epithelial Cells, UA 3      Microscopic Comment         EKG Readings: (Independently Interpreted)   Initial Reading: No STEMI. Previous EKG: Compared with most recent EKG Rhythm: Normal Sinus Rhythm. Heart Rate: 80. ST Segments: Normal ST Segments. T Waves: Normal.     ECG Results              EKG 12-lead (Final result)        Collection Time Result Time QRS Duration [facility] QTC Calculation    04/27/25 15:20:07 04/28/25 08:47:06 94 493                     Final result by Interface, Lab In [facility] (04/28/25 08:47:16)                   Narrative:    Test Reason : R00.0,    Vent. Rate :  80 BPM     Atrial Rate :  80 BPM     P-R Int : 184 ms          QRS Dur :  94 ms      QT Int : 428 ms       P-R-T Axes :  54  -7  34 degrees    QTcB Int : 493 ms    Normal sinus rhythm  Prolonged QT  Abnormal ECG    Confirmed by APARNA Pearce ([id number]) on 4/28/2025 8:47:05 AM    Referred By: AAAREFERRAL SELF           Confirmed By: APARNA Pearce                                  Imaging Results    None          Medications   LORazepam injection 2 mg (2 mg Intravenous Given 4/27/25 1511)   sodium chloride 0.9% bolus 1,000 mL 1,000 mL (0 mLs Intravenous Stopped 4/27/25 1942)     Medical Decision Making  65 y.o. female who presents to the ED for benzodiazepine withdrawal. Upon arrival to the ED she is tremulous, tachycardic, and tachypneic, but otherwise afebrile, hemodynamically stable, and non-toxic appearing. Will get lab work and EKG for any metabolic derangements and will start on her on a benzodiazepine for her withdrawal.  She denies any chest pain, shortness of breath.  Does have a history of hypokalemia, this could also be contributing to her general unwell feeling.  No infectious symptoms.    IV Ativan ordered upon patient arrival to room in ED.    Amount and/or Complexity of Data Reviewed  Labs: ordered. Decision-making details documented in ED Course.  ECG/medicine tests: ordered and independent  interpretation performed. Decision-making details documented in ED Course.    Risk  Prescription drug management.            Scribe Attestation:   Scribe #1: I performed the above scribed service and the documentation accurately describes the services I performed. I attest to the accuracy of the note.        ED Course as of 25   Sun 2025   1531 Pulse: 79  Heart rate improved after Ativan. [KL]   1554 CBC auto differential(!)  CBC is grossly unremarkable.  No leukocytosis. No anemia.  [KL]   1728 BP: 111/70  Blood pressure improved with improvement of anxiety from Ativan. [KL]   Fri May 02, 2025   2215 Critical care time spent on the evaluation and treatment of severe organ dysfunction, review of pertinent labs and imaging studies, discussions with consulting providers and discussions with patient/family: 60 minutes.   [KL]      ED Course User Index  [KL] Meena Calix MD          Physician Attestation for Scribe: I, Meena Calix MD, reviewed documentation as scribed in my presence, which is both accurate and complete.                   Clinical Impression:  Final diagnoses:  [R00.0] Tachycardia  [F13.930] Benzodiazepine withdrawal without complication (Primary)  [F41.9] Anxiety          ED Disposition Condition    Discharge Stable          ED Prescriptions       Medication Sig Dispense Start Date End Date Auth. Provider    ALPRAZolam (XANAX) 1 MG tablet Take 1 tablet (1 mg total) by mouth 2 (two) times daily. for 14 doses 14 tablet 2025, Kesha NGUYEN MD          Follow-up Information       Follow up With Specialties Details Why Contact Info    Primary Care Physician                     [1]   Social History  Tobacco Use    Smoking status: Former     Current packs/day: 0.00     Average packs/day: 0.1 packs/day for 0.5 years     Types: Cigarettes     Start date: 3/16/1976     Quit date: 1976     Years since quittin.6    Smokeless tobacco: Never   Substance Use Topics     Alcohol use: Yes     Alcohol/week: 1.0 standard drink of alcohol     Types: 1 Glasses of wine per week     Comment: rare    Drug use: No        Meena Calix MD  05/02/25 [id number]

## 2025-04-28 LAB
OHS QRS DURATION: 94 MS
OHS QTC CALCULATION: 493 MS

## 2025-04-29 ENCOUNTER — HOSPITAL ENCOUNTER (EMERGENCY)
Facility: HOSPITAL | Age: 65
Discharge: HOME | End: 2025-04-29
Attending: EMERGENCY MEDICINE
Payer: MEDICARE

## 2025-04-29 VITALS
HEART RATE: 70 BPM | BODY MASS INDEX: 28.35 KG/M2 | HEIGHT: 63 IN | DIASTOLIC BLOOD PRESSURE: 67 MMHG | TEMPERATURE: 98 F | RESPIRATION RATE: 20 BRPM | OXYGEN SATURATION: 87 % | SYSTOLIC BLOOD PRESSURE: 127 MMHG | WEIGHT: 160 LBS

## 2025-04-29 DIAGNOSIS — R10.9 ABDOMINAL PAIN: ICD-10-CM

## 2025-04-29 DIAGNOSIS — F13.20 BENZODIAZEPINE DEPENDENCE: Primary | ICD-10-CM

## 2025-04-29 LAB
BUN SERPL-MCNC: 19 MG/DL (ref 6–30)
CHLORIDE SERPL-SCNC: 105 MMOL/L (ref 95–110)
CREAT SERPL-MCNC: 1.1 MG/DL (ref 0.5–1.4)
GLUCOSE SERPL-MCNC: 108 MG/DL (ref 70–110)
HCT VFR BLD CALC: 36 %PCV (ref 36–54)
OHS QRS DURATION: 92 MS
OHS QTC CALCULATION: 480 MS
POC IONIZED CALCIUM: 1.17 MMOL/L (ref 1.06–1.42)
POC TCO2 (MEASURED): 27 MMOL/L (ref 23–29)
POTASSIUM BLD-SCNC: 3 MMOL/L (ref 3.5–5.1)
SAMPLE: ABNORMAL
SODIUM BLD-SCNC: 142 MMOL/L (ref 136–145)

## 2025-04-29 PROCEDURE — 63600175 PHARM REV CODE 636 W HCPCS: Performed by: EMERGENCY MEDICINE

## 2025-04-29 PROCEDURE — 93005 ELECTROCARDIOGRAM TRACING: CPT

## 2025-04-29 PROCEDURE — 96360 HYDRATION IV INFUSION INIT: CPT

## 2025-04-29 PROCEDURE — 25000003 PHARM REV CODE 250: Performed by: EMERGENCY MEDICINE

## 2025-04-29 PROCEDURE — 99284 EMERGENCY DEPT VISIT MOD MDM: CPT | Mod: 25

## 2025-04-29 PROCEDURE — 93010 ELECTROCARDIOGRAM REPORT: CPT | Mod: ,,, | Performed by: INTERNAL MEDICINE

## 2025-04-29 PROCEDURE — 96361 HYDRATE IV INFUSION ADD-ON: CPT

## 2025-04-29 PROCEDURE — 80047 BASIC METABLC PNL IONIZED CA: CPT

## 2025-04-29 RX ORDER — ALPRAZOLAM 0.25 MG/1
1 TABLET ORAL
Status: COMPLETED | OUTPATIENT
Start: 2025-04-29 | End: 2025-04-29

## 2025-04-29 RX ADMIN — ALPRAZOLAM 1 MG: 0.25 TABLET ORAL at 01:04

## 2025-04-29 RX ADMIN — SODIUM CHLORIDE, POTASSIUM CHLORIDE, SODIUM LACTATE AND CALCIUM CHLORIDE 500 ML: 600; 310; 30; 20 INJECTION, SOLUTION INTRAVENOUS at 02:04

## 2025-04-29 RX ADMIN — POTASSIUM BICARBONATE 40 MEQ: 391 TABLET, EFFERVESCENT ORAL at 03:04

## 2025-04-29 NOTE — ED NOTES
I-STAT Chem-8+ Results:   Value Reference Range   Sodium 142 136-145 mmol/L   Potassium  3.0 3.5-5.1 mmol/L   Chloride 105  mmol/L   Ionized Calcium 1.17 1.06-1.42 mmol/L   CO2 (measured) 27 23-29 mmol/L   Glucose 108  mg/dL   BUN 19 6-30 mg/dL   Creatinine 1.1 0.5-1.4 mg/dL   Hematocrit 36 36-54%

## 2025-04-29 NOTE — ED PROVIDER NOTES
"History:  Rosmery Woodard is a 65 y.o. female who presents to the ED with Withdrawal (Pt reports "withdrawal symptoms from not having my anxiety medication. Pt states she "lost" her medication. Denies HI/SI)    Described as 65-year-old female with a history of anxiety presenting to the emergency department with concern for benzodiazepine withdrawal.  She had read that benzos were addictive and tried to stop them cold turkey, though went into withdrawal and was seen at [facility] on 04/27/2025.  Laboratory studies were unremarkable and she was discharged with a new prescription until she would be able to see her psychiatrist.  She did not  this prescription as she reports she did not have a ride to that pharmacy.  She does report that her psychiatrist has refilled her normal monthly prescription which will be available today at the pharmacy close to her home.  She began to feel more anxious throughout the evening yesterday and felt as though she is starting to see shadows on top of the television so she came to the ER with concern for benzo withdrawal.    Review of Systems: All systems reviewed and are negative except as per history of present illness.    Medications:   Discharge Medication List as of 4/29/2025  5:09 AM        CONTINUE these medications which have NOT CHANGED    Details   !! ALPRAZolam (XANAX) 1 MG tablet Take 1 mg by mouth 3 (three) times daily as needed for Anxiety., Historical Med      !! ALPRAZolam (XANAX) 1 MG tablet Take 1 tablet (1 mg total) by mouth 2 (two) times daily. for 14 doses, Starting Sun 4/27/2025, Until Sun 5/4/2025, Normal      diphenoxylate-atropine 2.5-0.025 mg (LOMOTIL) 2.5-0.025 mg per tablet Take 1 tablet by mouth 4 (four) times daily as needed for Diarrhea., Until Discontinued, Historical Med      gabapentin (NEURONTIN) 100 MG capsule 200 mg every evening., Starting Fri 4/22/2016, Historical Med      LIDOcaine (LIDODERM) 5 % Place 3 patches onto the skin daily as needed " (for shoulder and chest wall pain). Remove & Discard patches to shoulder and chest wall within 12 hours or as directed by MD., Starting Fri 9/13/2024, Normal      mesalamine (ASACOL) 800 mg TbEC Take 1 tablet (800 mg total) by mouth 3 (three) times daily., Starting Sat 11/16/2024, Until Mon 12/16/2024, Normal      multivitamin (THERAGRAN) per tablet Take 1 tablet by mouth once daily. , Starting 6/19/2012, Until Discontinued, Historical Med      omeprazole (PRILOSEC) 20 MG capsule Take 20 mg by mouth 2 (two) times daily. , Starting 12/7/2015, Until Discontinued, Historical Med      oxybutynin (DITROPAN) 5 MG Tab Take 1 tablet (5 mg total) by mouth 3 (three) times daily as needed (Bladder spasms)., Starting Wed 12/7/2022, Until Fri 1/6/2023 at 2359, Normal      oxyCODONE (ROXICODONE) 5 MG immediate release tablet Take 1 tablet (5 mg total) by mouth every 6 (six) hours as needed for Pain., Starting Sat 11/16/2024, Normal      oxyCODONE-acetaminophen (PERCOCET) 5-325 mg per tablet Take 1 tablet by mouth every 6 (six) hours as needed for Pain., Starting Fri 2/21/2025, Normal      !! promethazine (PHENERGAN) 25 MG tablet Take 1 tablet (25 mg total) by mouth every 6 (six) hours as needed for Nausea., Starting Thu 1/19/2023, Print      !! promethazine (PHENERGAN) 25 MG tablet Take 1 tablet (25 mg total) by mouth every 6 (six) hours as needed for Nausea., Starting Sat 11/16/2024, Normal      tamsulosin (FLOMAX) 0.4 mg Cap Take 1 capsule (0.4 mg total) by mouth every evening. for 7 days, Starting Wed 12/7/2022, Until Wed 12/14/2022, Normal      zolpidem (AMBIEN) 10 mg Tab Take 10 mg by mouth every evening., Historical Med       !! - Potential duplicate medications found. Please discuss with provider.          PMH:   Past Medical History:   Diagnosis Date    Asthma     Hypertension     Liver abscess     hx    Portal vein thrombosis 10/2013    Portal vein thrombosis 10/2013    Ulcerative colitis 9/15/2013     PSH:   Past  Surgical History:   Procedure Laterality Date    ABDOMINAL SURGERY      3 exploratory surgeries    APPENDECTOMY       SECTION, LOW TRANSVERSE      3 C-sections    CHOLECYSTECTOMY      COLONOSCOPY      COLONOSCOPY N/A 2016    Procedure: COLONOSCOPY;  Surgeon: Brad King MD;  Location: [facility] ([address] [place] [address]);  Service: Endoscopy;  Laterality: N/A;    EXPLORATORY LAPAROTOMY W/ BOWEL RESECTION      HERNIA REPAIR      HYSTERECTOMY      INCISIONAL HERNIA REPAIR      UPPER GASTROINTESTINAL ENDOSCOPY      URETERAL STENT PLACEMENT Left 2022    Procedure: INSERTION, STENT, URETER;  Surgeon: Mann Geronimo MD;  Location: [facility];  Service: Urology;  Laterality: Left;     Allergies: She is allergic to versed [midazolam], bentyl [dicyclomine], darvocet a500 [propoxyphene n-acetaminophen], toradol [ketorolac], and morphine.  Social History: Marital Status: . She  reports that she quit smoking about 48 years ago. Her smoking use included cigarettes. She started smoking about 49 years ago. She smoked an average 0.1 packs per day for 0.5 years. She has never used smokeless tobacco.. She  reports current alcohol use of about 1.0 standard drink of alcohol per week..       Exam:  VITAL SIGNS:   Vitals:    25 0259 25 0300 25 0400 25 0410   BP:    127/67   BP Location:    Right arm   Patient Position:    Lying   Pulse: 63 61 73 70   Resp:    20   Temp:       TempSrc:       SpO2: 95% 95% 100% 100%   Weight:       Height:         Const: Awake and alert, anxious appearing  Head: Atraumatic  Eyes: Normal Conjunctiva  ENT: Normal External Ears, Nose and Mouth.  Neck: Full range of motion. No meningismus.  Resp: Normal respiratory effort, No distress, CTAB  Cardio: Equal and intact distal pulses, RRR  Abd: Soft, non tender, non distended.   Skin: No petechiae or rashes  Ext: No cyanosis, or edema  Neur: Awake and alert, stuttering speech, strength and sensation intact, cranial  nerves intact  Psych:  Anxious appearing    Data:  Results for orders placed or performed during the hospital encounter of 25   ISTAT PROCEDURE    Collection Time: 25  3:04 AM   Result Value Ref Range    POC Glucose 108 70 - 110 mg/dL    POC BUN 19 6 - 30 mg/dL    POC Creatinine 1.1 0.5 - 1.4 mg/dL    POC Sodium 142 136 - 145 mmol/L    POC Potassium 3.0 (L) 3.5 - 5.1 mmol/L    POC Chloride 105 95 - 110 mmol/L    POC TCO2 (MEASURED) 27 23 - 29 mmol/L    POC Ionized Calcium 1.17 1.06 - 1.42 mmol/L    POC Hematocrit 36 36 - 54 %PCV    Sample ISAAC      Imaging Results    None       12-LEAD EKG INTERPRETATION BY ME:  Rate/Rhythm: Normal Sinus Rhythm with rate of 75 beats per minute  QRS, ST, T-waves: No changes consistent with acute ischemia  Impression:  No evidence of ischemia or arrhythmia     Labs & Imaging studies were reviewed independently by me.     Medical Decision Makin-year-old female again returning to the emergency department with concern for benzo withdrawal.  She is not tachycardic on initial examination, though is slightly tremulous, extremely anxious appearing, and endorses hallucinations while at home.  She is given her normal home dose of Xanax.  Her stuttering speech completely resolved.  Doubt CVA/TIA, suspect anxiety.  She complained of a muscle cramp in her leg, i-STAT performed showing hypokalemia, which was replaced orally.  She was given IV fluids.  She felt much improved.  She has a prescription from [facility] at a pharmacy, and does report she will have a new prescription for her psychiatrist also today.  She was encouraged to  these prescriptions and take her medication as prescribed.  She is ambulatory without difficulty.  She denies any SI/HI, denies hallucinations after her dose of Xanax in the ED.  She does currently not meet criteria for pec hold.  She is stable for discharge home with outpatient follow up and encouraged to  her prescriptions.    Clinical  Impression:  1. Benzodiazepine dependence                 Medication List        ASK your doctor about these medications      * ALPRAZolam 1 MG tablet  Commonly known as: XANAX     * ALPRAZolam 1 MG tablet  Commonly known as: XANAX  Take 1 tablet (1 mg total) by mouth 2 (two) times daily. for 14 doses     diphenoxylate-atropine 2.5-0.025 mg 2.5-0.025 mg per tablet  Commonly known as: LOMOTIL     gabapentin 100 MG capsule  Commonly known as: NEURONTIN     LIDOcaine 5 %  Commonly known as: LIDODERM  Place 3 patches onto the skin daily as needed (for shoulder and chest wall pain). Remove & Discard patches to shoulder and chest wall within 12 hours or as directed by MD.     mesalamine 800 mg Tbec  Commonly known as: ASACOL  Take 1 tablet (800 mg total) by mouth 3 (three) times daily.     multivitamin per tablet  Commonly known as: THERAGRAN     omeprazole 20 MG capsule  Commonly known as: PRILOSEC     oxybutynin 5 MG Tab  Commonly known as: DITROPAN  Take 1 tablet (5 mg total) by mouth 3 (three) times daily as needed (Bladder spasms).     oxyCODONE 5 MG immediate release tablet  Commonly known as: ROXICODONE  Take 1 tablet (5 mg total) by mouth every 6 (six) hours as needed for Pain.     oxyCODONE-acetaminophen 5-325 mg per tablet  Commonly known as: PERCOCET  Take 1 tablet by mouth every 6 (six) hours as needed for Pain.     * promethazine 25 MG tablet  Commonly known as: PHENERGAN  Take 1 tablet (25 mg total) by mouth every 6 (six) hours as needed for Nausea.     * promethazine 25 MG tablet  Commonly known as: PHENERGAN  Take 1 tablet (25 mg total) by mouth every 6 (six) hours as needed for Nausea.     tamsulosin 0.4 mg Cap  Commonly known as: FLOMAX  Take 1 capsule (0.4 mg total) by mouth every evening. for 7 days     zolpidem 10 mg Tab  Commonly known as: AMBIEN           * This list has 4 medication(s) that are the same as other medications prescribed for you. Read the directions carefully, and ask your doctor or  other care provider to review them with you.                  Follow-up Information       Your psychiatrist. Call today.                               Brenda Newell MD  04/29/25 0897

## 2025-04-29 NOTE — DISCHARGE INSTRUCTIONS
your benzo today as prescribed.  Call your psychiatrist of the pharmacy does not have the prescription.  If you are unable to get a prescription tomorrow from your psychiatrist,  the prescription from the [facility] at the corner of [address] in [place].

## 2025-05-08 ENCOUNTER — HOSPITAL ENCOUNTER (EMERGENCY)
Facility: OTHER | Age: 65
Discharge: HOME | End: 2025-05-09
Attending: EMERGENCY MEDICINE
Payer: MEDICARE

## 2025-05-08 DIAGNOSIS — K21.9 GASTROESOPHAGEAL REFLUX DISEASE, UNSPECIFIED WHETHER ESOPHAGITIS PRESENT: Primary | ICD-10-CM

## 2025-05-08 DIAGNOSIS — R10.9 ABDOMINAL PAIN: ICD-10-CM

## 2025-05-08 DIAGNOSIS — F41.9 ANXIETY: ICD-10-CM

## 2025-05-08 DIAGNOSIS — R07.9 CHEST PAIN: ICD-10-CM

## 2025-05-08 LAB
ABSOLUTE EOSINOPHIL (OHS): 0.3 K/UL
ABSOLUTE MONOCYTE (OHS): 0.59 K/UL (ref 0.3–1)
ABSOLUTE NEUTROPHIL COUNT (OHS): 3.07 K/UL (ref 1.8–7.7)
ALBUMIN SERPL BCP-MCNC: 3.6 G/DL (ref 3.5–5.2)
ALP SERPL-CCNC: 72 UNIT/L (ref 40–150)
ALT SERPL W/O P-5'-P-CCNC: 16 UNIT/L (ref 10–44)
ANION GAP (OHS): 9 MMOL/L (ref 8–16)
AST SERPL-CCNC: 20 UNIT/L (ref 11–45)
BACTERIA #/AREA URNS AUTO: ABNORMAL /HPF
BASOPHILS # BLD AUTO: 0.07 K/UL
BASOPHILS NFR BLD AUTO: 1 %
BILIRUB SERPL-MCNC: 0.2 MG/DL (ref 0.1–1)
BILIRUB UR QL STRIP.AUTO: NEGATIVE
BUN SERPL-MCNC: 18 MG/DL (ref 8–23)
CALCIUM SERPL-MCNC: 9 MG/DL (ref 8.7–10.5)
CHLORIDE SERPL-SCNC: 108 MMOL/L (ref 95–110)
CLARITY UR: CLEAR
CO2 SERPL-SCNC: 23 MMOL/L (ref 23–29)
COLOR UR AUTO: YELLOW
CREAT SERPL-MCNC: 1.1 MG/DL (ref 0.5–1.4)
CREAT SERPL-MCNC: 1.2 MG/DL (ref 0.5–1.4)
ERYTHROCYTE [DISTWIDTH] IN BLOOD BY AUTOMATED COUNT: 14.7 % (ref 11.5–14.5)
GFR SERPLBLD CREATININE-BSD FMLA CKD-EPI: 56 ML/MIN/1.73/M2
GLUCOSE SERPL-MCNC: 96 MG/DL (ref 70–110)
GLUCOSE UR QL STRIP: NEGATIVE
HCT VFR BLD AUTO: 35 % (ref 37–48.5)
HGB BLD-MCNC: 11.8 GM/DL (ref 12–16)
HGB UR QL STRIP: NEGATIVE
IMM GRANULOCYTES # BLD AUTO: 0.01 K/UL (ref 0–0.04)
IMM GRANULOCYTES NFR BLD AUTO: 0.1 % (ref 0–0.5)
KETONES UR QL STRIP: NEGATIVE
LEUKOCYTE ESTERASE UR QL STRIP: ABNORMAL
LIPASE SERPL-CCNC: 61 U/L (ref 4–60)
LYMPHOCYTES # BLD AUTO: 3.31 K/UL (ref 1–4.8)
MCH RBC QN AUTO: 27.7 PG (ref 27–31)
MCHC RBC AUTO-ENTMCNC: 33.7 G/DL (ref 32–36)
MCV RBC AUTO: 82 FL (ref 82–98)
MICROSCOPIC COMMENT: ABNORMAL
NITRITE UR QL STRIP: NEGATIVE
NUCLEATED RBC (/100WBC) (OHS): 0 /100 WBC
PH UR STRIP: 7 [PH]
PLATELET # BLD AUTO: 413 K/UL (ref 150–450)
PMV BLD AUTO: 10.8 FL (ref 9.2–12.9)
POCT GLUCOSE: 114 MG/DL (ref 70–110)
POTASSIUM SERPL-SCNC: 3.1 MMOL/L (ref 3.5–5.1)
PROT SERPL-MCNC: 8 GM/DL (ref 6–8.4)
PROT UR QL STRIP: NEGATIVE
RBC # BLD AUTO: 4.26 M/UL (ref 4–5.4)
RBC #/AREA URNS AUTO: 8 /HPF (ref 0–4)
RELATIVE EOSINOPHIL (OHS): 4.1 %
RELATIVE LYMPHOCYTE (OHS): 45 % (ref 18–48)
RELATIVE MONOCYTE (OHS): 8 % (ref 4–15)
RELATIVE NEUTROPHIL (OHS): 41.8 % (ref 38–73)
SAMPLE: NORMAL
SODIUM SERPL-SCNC: 140 MMOL/L (ref 136–145)
SP GR UR STRIP: >=1.03
SQUAMOUS #/AREA URNS AUTO: 2 /HPF
TROPONIN I SERPL DL<=0.01 NG/ML-MCNC: <0.006 NG/ML
TSH SERPL-ACNC: 0.66 UIU/ML (ref 0.4–4)
UROBILINOGEN UR STRIP-ACNC: NEGATIVE EU/DL
WBC # BLD AUTO: 7.35 K/UL (ref 3.9–12.7)
WBC #/AREA URNS AUTO: 16 /HPF (ref 0–5)

## 2025-05-08 PROCEDURE — 83690 ASSAY OF LIPASE: CPT | Performed by: EMERGENCY MEDICINE

## 2025-05-08 PROCEDURE — 85025 COMPLETE CBC W/AUTO DIFF WBC: CPT | Performed by: EMERGENCY MEDICINE

## 2025-05-08 PROCEDURE — 93005 ELECTROCARDIOGRAM TRACING: CPT

## 2025-05-08 PROCEDURE — 25500020 PHARM REV CODE 255: Performed by: EMERGENCY MEDICINE

## 2025-05-08 PROCEDURE — 25000003 PHARM REV CODE 250: Performed by: EMERGENCY MEDICINE

## 2025-05-08 PROCEDURE — 99285 EMERGENCY DEPT VISIT HI MDM: CPT | Mod: 25

## 2025-05-08 PROCEDURE — 84484 ASSAY OF TROPONIN QUANT: CPT | Performed by: EMERGENCY MEDICINE

## 2025-05-08 PROCEDURE — 82962 GLUCOSE BLOOD TEST: CPT

## 2025-05-08 PROCEDURE — 63600175 PHARM REV CODE 636 W HCPCS: Performed by: EMERGENCY MEDICINE

## 2025-05-08 PROCEDURE — 80053 COMPREHEN METABOLIC PANEL: CPT | Performed by: EMERGENCY MEDICINE

## 2025-05-08 PROCEDURE — 87086 URINE CULTURE/COLONY COUNT: CPT | Performed by: EMERGENCY MEDICINE

## 2025-05-08 PROCEDURE — 81001 URINALYSIS AUTO W/SCOPE: CPT | Performed by: EMERGENCY MEDICINE

## 2025-05-08 PROCEDURE — 84443 ASSAY THYROID STIM HORMONE: CPT | Performed by: EMERGENCY MEDICINE

## 2025-05-08 PROCEDURE — 96375 TX/PRO/DX INJ NEW DRUG ADDON: CPT

## 2025-05-08 PROCEDURE — 96374 THER/PROPH/DIAG INJ IV PUSH: CPT

## 2025-05-08 PROCEDURE — 93010 ELECTROCARDIOGRAM REPORT: CPT | Mod: ,,, | Performed by: INTERNAL MEDICINE

## 2025-05-08 RX ORDER — FAMOTIDINE 20 MG/1
20 TABLET, FILM COATED ORAL 2 TIMES DAILY
Qty: 20 TABLET | Refills: 0 | Status: SHIPPED | OUTPATIENT
Start: 2025-05-08 | End: 2025-05-09

## 2025-05-08 RX ORDER — LANOLIN ALCOHOL/MO/W.PET/CERES
400 CREAM (GRAM) TOPICAL ONCE
Status: COMPLETED | OUTPATIENT
Start: 2025-05-08 | End: 2025-05-08

## 2025-05-08 RX ORDER — ALUMINUM HYDROXIDE, MAGNESIUM HYDROXIDE, AND SIMETHICONE 1200; 120; 1200 MG/30ML; MG/30ML; MG/30ML
30 SUSPENSION ORAL ONCE
Status: COMPLETED | OUTPATIENT
Start: 2025-05-08 | End: 2025-05-08

## 2025-05-08 RX ORDER — LIDOCAINE HYDROCHLORIDE 20 MG/ML
15 SOLUTION OROPHARYNGEAL ONCE
Status: COMPLETED | OUTPATIENT
Start: 2025-05-08 | End: 2025-05-08

## 2025-05-08 RX ORDER — HYDROXYZINE PAMOATE 25 MG/1
50 CAPSULE ORAL
Status: COMPLETED | OUTPATIENT
Start: 2025-05-08 | End: 2025-05-08

## 2025-05-08 RX ORDER — ONDANSETRON HYDROCHLORIDE 2 MG/ML
4 INJECTION, SOLUTION INTRAVENOUS
Status: COMPLETED | OUTPATIENT
Start: 2025-05-08 | End: 2025-05-08

## 2025-05-08 RX ORDER — LORAZEPAM 2 MG/ML
2 INJECTION INTRAMUSCULAR
Status: COMPLETED | OUTPATIENT
Start: 2025-05-08 | End: 2025-05-08

## 2025-05-08 RX ORDER — HYDROXYZINE HYDROCHLORIDE 25 MG/1
25 TABLET, FILM COATED ORAL 4 TIMES DAILY PRN
Qty: 15 TABLET | Refills: 0 | Status: SHIPPED | OUTPATIENT
Start: 2025-05-08 | End: 2025-05-09

## 2025-05-08 RX ADMIN — LIDOCAINE HYDROCHLORIDE 15 ML: 20 SOLUTION ORAL at 08:05

## 2025-05-08 RX ADMIN — ONDANSETRON 4 MG: 2 INJECTION INTRAMUSCULAR; INTRAVENOUS at 08:05

## 2025-05-08 RX ADMIN — ALUMINUM HYDROXIDE, MAGNESIUM HYDROXIDE, AND DIMETHICONE 30 ML: 200; 20; 200 SUSPENSION ORAL at 08:05

## 2025-05-08 RX ADMIN — POTASSIUM BICARBONATE 40 MEQ: 391 TABLET, EFFERVESCENT ORAL at 10:05

## 2025-05-08 RX ADMIN — HYDROXYZINE PAMOATE 50 MG: 25 CAPSULE ORAL at 11:05

## 2025-05-08 RX ADMIN — IOHEXOL 100 ML: 350 INJECTION, SOLUTION INTRAVENOUS at 10:05

## 2025-05-08 RX ADMIN — Medication 400 MG: at 10:05

## 2025-05-08 RX ADMIN — LORAZEPAM 2 MG: 2 INJECTION INTRAMUSCULAR; INTRAVENOUS at 09:05

## 2025-05-09 VITALS
HEIGHT: 63 IN | BODY MASS INDEX: 28.35 KG/M2 | WEIGHT: 160 LBS | OXYGEN SATURATION: 95 % | SYSTOLIC BLOOD PRESSURE: 120 MMHG | DIASTOLIC BLOOD PRESSURE: 67 MMHG | TEMPERATURE: 98 F | HEART RATE: 85 BPM | RESPIRATION RATE: 20 BRPM

## 2025-05-09 LAB
HOLD SPECIMEN: NORMAL
OHS QRS DURATION: 96 MS
OHS QTC CALCULATION: 476 MS

## 2025-05-09 PROCEDURE — 25000003 PHARM REV CODE 250: Performed by: EMERGENCY MEDICINE

## 2025-05-09 RX ORDER — POTASSIUM CHLORIDE 750 MG/1
10 TABLET, EXTENDED RELEASE ORAL DAILY
Qty: 5 TABLET | Refills: 0 | Status: SHIPPED | OUTPATIENT
Start: 2025-05-09 | End: 2025-05-14

## 2025-05-09 RX ORDER — FAMOTIDINE 20 MG/1
20 TABLET, FILM COATED ORAL DAILY
Qty: 20 TABLET | Refills: 0 | Status: SHIPPED | OUTPATIENT
Start: 2025-05-09 | End: 2025-05-29

## 2025-05-09 RX ORDER — HYDROXYZINE HYDROCHLORIDE 25 MG/1
25 TABLET, FILM COATED ORAL 4 TIMES DAILY PRN
Qty: 15 TABLET | Refills: 0 | Status: SHIPPED | OUTPATIENT
Start: 2025-05-09

## 2025-05-09 RX ORDER — POTASSIUM CHLORIDE 20 MEQ/1
60 TABLET, EXTENDED RELEASE ORAL
Status: COMPLETED | OUTPATIENT
Start: 2025-05-09 | End: 2025-05-09

## 2025-05-09 RX ADMIN — POTASSIUM CHLORIDE 60 MEQ: 1500 TABLET, EXTENDED RELEASE ORAL at 12:05

## 2025-05-09 NOTE — ED TRIAGE NOTES
Pt presents to ED abdominal pain x1 day. Endorses epigastric pain that radiates to chest. Reports last BM was yesterday. Endorses nausea and anxiety. Denies vomiting, fever or chills. Aaox4, NAD noted.

## 2025-05-09 NOTE — ED PROVIDER NOTES
"     Source of History:  The patient    Chief complaint:  Abdominal Pain (C/o abd and chest pain, describes as really tired, c/o feeling bloated, EKG completed, C/o anxiety, )      HPI:  Rosmery Woodard is a 65 y.o. female  who complains of 1 day of abdominal pain, with feeling bloated.  Also has some burning in the epigastric region going up into the chest.  States she had fried chicken for dinner last night.  Had normal bowel movement yesterday.  Is nauseated but no vomiting.  States she also has body aches in his complaining of anxiety as well.  Feels very tired.  No fevers or chills.    This is the extent to the patients complaints today here in the emergency department.    ROS:   See HPI.    Review of patient's allergies indicates:   Allergen Reactions    Versed [midazolam] Nausea And Vomiting    Bentyl [dicyclomine] Hives    Darvocet a500 [propoxyphene n-acetaminophen] Hives    Toradol [ketorolac] Hives    Morphine Nausea And Vomiting     22 @ 0935 Pt, stated "I take Dilaudid without any reactions to it" pt also states that she "can take oxycodone for pain."        PMH:  As per HPI and below:  Past Medical History:   Diagnosis Date    Asthma     Hypertension     Liver abscess     hx    Portal vein thrombosis 10/2013    Portal vein thrombosis 10/2013    Ulcerative colitis 9/15/2013     Past Surgical History:   Procedure Laterality Date    ABDOMINAL SURGERY      3 exploratory surgeries    APPENDECTOMY       SECTION, LOW TRANSVERSE      3 C-sections    CHOLECYSTECTOMY      COLONOSCOPY      COLONOSCOPY N/A 2016    Procedure: COLONOSCOPY;  Surgeon: Brad King MD;  Location: [address]);  Service: Endoscopy;  Laterality: N/A;    EXPLORATORY LAPAROTOMY W/ BOWEL RESECTION      HERNIA REPAIR      HYSTERECTOMY      INCISIONAL HERNIA REPAIR  2014    UPPER GASTROINTESTINAL ENDOSCOPY      URETERAL STENT PLACEMENT Left 2022    Procedure: INSERTION, STENT, URETER;  Surgeon: " "Mann Geronimo MD;  Location: [facility];  Service: Urology;  Laterality: Left;       Social History[1]    Physical Exam:    /61   Pulse 77   Temp 97.5 °F (36.4 °C) (Oral)   Resp 20   Ht 5' 3" (1.6 m)   Wt 72.6 kg (160 lb)   SpO2 99%   BMI 28.34 kg/m²   Nursing note and vital signs reviewed.  Constitutional: No acute distress.  Nontoxic  Eyes:  Conjunctiva normal.  Extraocular muscles are intact.  Cardiovascular: Regular rate and rhythm.  No murmurs. No gallops. No rubs  Respiratory: Clear to auscultation bilaterally.  Good air movement.  No wheezes.  No rhonchi. No rales. No accessory muscle use..  Abdomen:  Tenderness to palpation in the epigastric region.  No significant distention.  No guarding or rebound.  Non peritoneal.  Neuro: alert. At baseline.  No focal neurological deficits.  MSK: Moving all extremities      Summary of Previous Medical Records:  Reviewed emergency department visit from April 29, 2025 at an outside facility.  Was diagnosed with benzodiazepine dependence and concern for withdrawal.  Electrolytes were unremarkable other than low potassium which she was replaced with oral potassium.  Documented that there was no tachycardia and appeared to be very anxious but did not show active signs of the acute benzo withdrawal.      Differential Dx/ MDM/ Workup:  65-year-old female with ulcerative colitis as well as acid reflux complains of abdominal pain and bloating.  Based on history as well as physical exam I suspect likely gastritis.  Also considered pancreatitis.  No Drake sign to suggest an acute cholecystitis.  She has normal bowel movements and no significant distention and I have considered but do not suspect ulcerative colitis.  Will give a GI cocktail as well as nausea medication.  Will get labs as well as CT scan.  She has had a couple recent emergency department visits for concern of benzo withdrawal.  There was no evidence of withdrawal and she was discharged at that time.  " Her vital signs are unremarkable.  I do not see any evidence of benzo withdrawal at this time either.      ED Course as of 05/09/25 0004   u May 08, 2025   2122 WBC: 7.35 [SM]   2122 Hemoglobin(!): 11.8 [SM]   2122 Platelet Count: 413  Patient is complaining of severe anxiety.  Will give Ativan and re-evaluate. [SM]   2123 EKG 12-lead  EKG independently interpreted by myself shows normal sinus rhythm at a rate of 85, normal intervals, narrow QRS, no acute ST T wave abnormalities.  Overall impression normal EKG.  Compared to an EKG on April 29, 2025 shows no change. [SM]   2137 POC Creatinine: 1.2 []   2149 Lipase(!): 61 []   2156 On re-evaluation patient is pain is feeling much better after GI cocktail.  Still complaining of anxiety.  Received Ativan about 20 minutes ago. [SM]   2158 Sodium: 140 [SM]   2159 Potassium(!): 3.1  Will replace orally with Mg and potassium. [SM]   2159 Creatinine: 1.1 [SM]   2159 BILIRUBIN TOTAL: 0.2 [SM]   2159 ALP: 72 [SM]   2159 AST: 20 [SM]   2159 Anion Gap: 9 [SM]   2246 On re-evaluation patient is resting comfortably with normal vital signs.  Still feels anxious.  Pending CT scan and will plan for discharge.  Will give Vistaril for anxiety. [SM]   2357 CT Abdomen Pelvis With IV Contrast NO Oral Contrast  CT abdomen pelvis shows colonic diverticulosis.  Small hiatal hernia otherwise no significant abnormalities.  Mild perivesicular infiltration. []   Fri May 09, 2025   0003 I discussed the results with the patient and answered all questions.  She is feeling much better and ready for discharge.  To follow up with Internal Medicine as well as Gastroenterology as an outpatient. []      ED Course User Index  [] Greg Jones, DO                 Diagnostic Impression:    1. Gastroesophageal reflux disease, unspecified whether esophagitis present    2. Chest pain    3. Abdominal pain    4. Anxiety         ED Disposition Condition    Discharge Stable            ED  Prescriptions       Medication Sig Dispense Start Date End Date Auth. Provider    famotidine (PEPCID) 20 MG tablet  (Status: Discontinued) Take 1 tablet (20 mg total) by mouth 2 (two) times daily. for 10 days 20 tablet 2025 Greg Jones,     hydrOXYzine HCL (ATARAX) 25 MG tablet  (Status: Discontinued) Take 1 tablet (25 mg total) by mouth 4 (four) times daily as needed for Anxiety. 15 tablet 2025 Greg Jones,     famotidine (PEPCID) 20 MG tablet Take 1 tablet (20 mg total) by mouth once daily. for 20 days 20 tablet 2025 Greg Jones,     hydrOXYzine HCL (ATARAX) 25 MG tablet Take 1 tablet (25 mg total) by mouth 4 (four) times daily as needed for Anxiety. 15 tablet 2025 -- Greg Jones DO          Follow-up Information       Follow up With Specialties Details Why Contact Info Additional Information    [facility] - Internal Medicine Internal Medicine Schedule an appointment as soon as possible for a visit in 1 week  [address] [facility] [address] [phone] Turn at Entrance [address] on [facility] in [facility] and take elevators to Floor 2. Follow signs to [facility]. Take [facility] Elevators to Floor 8 for [address].    [facility] - [facility] Gastro[facility]ology Schedule an appointment as soon as possible for a visit in 1 week  [address] [facility] [address] [phone] GI Center & Urology - Main Building, 4th Floor Please park in [facility] and take [facility] elevator                 [1]   Social History  Tobacco Use    Smoking status: Former     Current packs/day: 0.00     Average packs/day: 0.1 packs/day for 0.5 years     Types: Cigarettes     Start date: 3/16/1976     Quit date: 1976     Years since quittin.6    Smokeless tobacco: Never   Substance Use Topics    Alcohol use: Not Currently     Alcohol/week: 1.0 standard drink of alcohol     Types: 1 Glasses of  wine per week     Comment: rare    Drug use: No        Greg Jones, DO  05/09/25 0004

## 2025-05-09 NOTE — ED NOTES
"Pt reports feeling very anxious and "overwhelmed". Requesting something to calm her down. MD made aware  "

## 2025-05-10 LAB — BACTERIA UR CULT: NORMAL

## 2025-08-14 ENCOUNTER — HOSPITAL ENCOUNTER (EMERGENCY)
Facility: HOSPITAL | Age: 65
Discharge: HOME | End: 2025-08-14
Attending: EMERGENCY MEDICINE
Payer: MEDICARE

## 2025-08-14 VITALS
TEMPERATURE: 98 F | OXYGEN SATURATION: 100 % | RESPIRATION RATE: 18 BRPM | HEART RATE: 57 BPM | BODY MASS INDEX: 28.35 KG/M2 | HEIGHT: 63 IN | DIASTOLIC BLOOD PRESSURE: 77 MMHG | WEIGHT: 160 LBS | SYSTOLIC BLOOD PRESSURE: 129 MMHG

## 2025-08-14 DIAGNOSIS — K92.1 BLOOD IN STOOL: ICD-10-CM

## 2025-08-14 DIAGNOSIS — R10.9 ABDOMINAL PAIN, UNSPECIFIED ABDOMINAL LOCATION: Primary | ICD-10-CM

## 2025-08-14 DIAGNOSIS — R11.2 NAUSEA AND VOMITING, UNSPECIFIED VOMITING TYPE: ICD-10-CM

## 2025-08-14 DIAGNOSIS — K57.90 DIVERTICULOSIS: ICD-10-CM

## 2025-08-14 DIAGNOSIS — R19.7 DIARRHEA, UNSPECIFIED TYPE: ICD-10-CM

## 2025-08-14 LAB
ABSOLUTE EOSINOPHIL (OHS): 0.23 K/UL
ABSOLUTE MONOCYTE (OHS): 0.45 K/UL (ref 0.3–1)
ABSOLUTE NEUTROPHIL COUNT (OHS): 2.71 K/UL (ref 1.8–7.7)
ALBUMIN SERPL BCP-MCNC: 3.8 G/DL (ref 3.5–5.2)
ALP SERPL-CCNC: 80 UNIT/L (ref 40–150)
ALT SERPL W/O P-5'-P-CCNC: 31 UNIT/L (ref 0–55)
ANION GAP (OHS): 12 MMOL/L (ref 8–16)
AST SERPL-CCNC: 39 UNIT/L (ref 0–50)
BACTERIA #/AREA URNS AUTO: NORMAL /HPF
BASOPHILS # BLD AUTO: 0.03 K/UL
BASOPHILS NFR BLD AUTO: 0.5 %
BILIRUB SERPL-MCNC: 0.3 MG/DL (ref 0.1–1)
BILIRUB UR QL STRIP.AUTO: NEGATIVE
BUN SERPL-MCNC: 6 MG/DL (ref 6–30)
BUN SERPL-MCNC: 7 MG/DL (ref 8–23)
CALCIUM SERPL-MCNC: 8.9 MG/DL (ref 8.7–10.5)
CHLORIDE SERPL-SCNC: 103 MMOL/L (ref 95–110)
CHLORIDE SERPL-SCNC: 106 MMOL/L (ref 95–110)
CLARITY UR: CLEAR
CO2 SERPL-SCNC: 26 MMOL/L (ref 23–29)
COLOR UR AUTO: COLORLESS
CREAT SERPL-MCNC: 1 MG/DL (ref 0.5–1.4)
CREAT SERPL-MCNC: 1.1 MG/DL (ref 0.5–1.4)
CRP SERPL-MCNC: 4.3 MG/L
ERYTHROCYTE [DISTWIDTH] IN BLOOD BY AUTOMATED COUNT: 15.3 % (ref 11.5–14.5)
ERYTHROCYTE [SEDIMENTATION RATE] IN BLOOD BY PHOTOMETRIC METHOD: 40 MM/HR
GFR SERPLBLD CREATININE-BSD FMLA CKD-EPI: 56 ML/MIN/1.73/M2
GLUCOSE SERPL-MCNC: 94 MG/DL (ref 70–110)
GLUCOSE SERPL-MCNC: 98 MG/DL (ref 70–110)
GLUCOSE UR QL STRIP: NEGATIVE
HCT VFR BLD AUTO: 37.7 % (ref 37–48.5)
HCT VFR BLD CALC: 35 %PCV (ref 36–54)
HGB BLD-MCNC: 12.2 GM/DL (ref 12–16)
HGB UR QL STRIP: NEGATIVE
IMM GRANULOCYTES # BLD AUTO: 0.01 K/UL (ref 0–0.04)
IMM GRANULOCYTES NFR BLD AUTO: 0.2 % (ref 0–0.5)
KETONES UR QL STRIP: NEGATIVE
LEUKOCYTE ESTERASE UR QL STRIP: ABNORMAL
LIPASE SERPL-CCNC: 21 U/L (ref 4–60)
LYMPHOCYTES # BLD AUTO: 2.86 K/UL (ref 1–4.8)
MCH RBC QN AUTO: 26.7 PG (ref 27–31)
MCHC RBC AUTO-ENTMCNC: 32.4 G/DL (ref 32–36)
MCV RBC AUTO: 83 FL (ref 82–98)
MICROSCOPIC COMMENT: NORMAL
NITRITE UR QL STRIP: NEGATIVE
NUCLEATED RBC (/100WBC) (OHS): 0 /100 WBC
PH UR STRIP: 7 [PH]
PLATELET # BLD AUTO: 394 K/UL (ref 150–450)
PMV BLD AUTO: 11 FL (ref 9.2–12.9)
POC IONIZED CALCIUM: 1.04 MMOL/L (ref 1.06–1.42)
POC TCO2 (MEASURED): 23 MMOL/L (ref 23–29)
POTASSIUM BLD-SCNC: 2.8 MMOL/L (ref 3.5–5.1)
POTASSIUM SERPL-SCNC: 3.5 MMOL/L (ref 3.5–5.1)
PROT SERPL-MCNC: 8 GM/DL (ref 6–8.4)
PROT UR QL STRIP: NEGATIVE
RBC # BLD AUTO: 4.57 M/UL (ref 4–5.4)
RBC #/AREA URNS AUTO: 1 /HPF (ref 0–4)
RELATIVE EOSINOPHIL (OHS): 3.7 %
RELATIVE LYMPHOCYTE (OHS): 45.5 % (ref 18–48)
RELATIVE MONOCYTE (OHS): 7.2 % (ref 4–15)
RELATIVE NEUTROPHIL (OHS): 42.9 % (ref 38–73)
SAMPLE: ABNORMAL
SODIUM BLD-SCNC: 142 MMOL/L (ref 136–145)
SODIUM SERPL-SCNC: 141 MMOL/L (ref 136–145)
SP GR UR STRIP: 1
SQUAMOUS #/AREA URNS AUTO: 3 /HPF
UROBILINOGEN UR STRIP-ACNC: NEGATIVE EU/DL
WBC # BLD AUTO: 6.29 K/UL (ref 3.9–12.7)
WBC #/AREA URNS AUTO: 1 /HPF (ref 0–5)

## 2025-08-14 PROCEDURE — 86140 C-REACTIVE PROTEIN: CPT | Performed by: PHYSICIAN ASSISTANT

## 2025-08-14 PROCEDURE — 96375 TX/PRO/DX INJ NEW DRUG ADDON: CPT

## 2025-08-14 PROCEDURE — 96374 THER/PROPH/DIAG INJ IV PUSH: CPT | Mod: XU

## 2025-08-14 PROCEDURE — 93005 ELECTROCARDIOGRAM TRACING: CPT

## 2025-08-14 PROCEDURE — 85025 COMPLETE CBC W/AUTO DIFF WBC: CPT | Performed by: STUDENT IN AN ORGANIZED HEALTH CARE EDUCATION/TRAINING PROGRAM

## 2025-08-14 PROCEDURE — 25500020 PHARM REV CODE 255: Performed by: EMERGENCY MEDICINE

## 2025-08-14 PROCEDURE — 81003 URINALYSIS AUTO W/O SCOPE: CPT | Performed by: STUDENT IN AN ORGANIZED HEALTH CARE EDUCATION/TRAINING PROGRAM

## 2025-08-14 PROCEDURE — 83690 ASSAY OF LIPASE: CPT | Performed by: STUDENT IN AN ORGANIZED HEALTH CARE EDUCATION/TRAINING PROGRAM

## 2025-08-14 PROCEDURE — 25000003 PHARM REV CODE 250: Performed by: PHYSICIAN ASSISTANT

## 2025-08-14 PROCEDURE — 63600175 PHARM REV CODE 636 W HCPCS: Performed by: PHYSICIAN ASSISTANT

## 2025-08-14 PROCEDURE — 82330 ASSAY OF CALCIUM: CPT

## 2025-08-14 PROCEDURE — 96361 HYDRATE IV INFUSION ADD-ON: CPT

## 2025-08-14 PROCEDURE — 80053 COMPREHEN METABOLIC PANEL: CPT | Performed by: STUDENT IN AN ORGANIZED HEALTH CARE EDUCATION/TRAINING PROGRAM

## 2025-08-14 PROCEDURE — 99285 EMERGENCY DEPT VISIT HI MDM: CPT | Mod: 25

## 2025-08-14 PROCEDURE — 80047 BASIC METABLC PNL IONIZED CA: CPT

## 2025-08-14 PROCEDURE — 93010 ELECTROCARDIOGRAM REPORT: CPT | Mod: ,,, | Performed by: INTERNAL MEDICINE

## 2025-08-14 PROCEDURE — 85652 RBC SED RATE AUTOMATED: CPT | Performed by: PHYSICIAN ASSISTANT

## 2025-08-14 RX ORDER — DIPHENHYDRAMINE HYDROCHLORIDE 50 MG/ML
25 INJECTION, SOLUTION INTRAMUSCULAR; INTRAVENOUS
Status: COMPLETED | OUTPATIENT
Start: 2025-08-14 | End: 2025-08-14

## 2025-08-14 RX ORDER — DROPERIDOL 2.5 MG/ML
1.25 INJECTION, SOLUTION INTRAMUSCULAR; INTRAVENOUS ONCE
Status: COMPLETED | OUTPATIENT
Start: 2025-08-14 | End: 2025-08-14

## 2025-08-14 RX ORDER — OXYCODONE HYDROCHLORIDE 5 MG/1
5 TABLET ORAL
Refills: 0 | Status: COMPLETED | OUTPATIENT
Start: 2025-08-14 | End: 2025-08-14

## 2025-08-14 RX ORDER — ONDANSETRON HYDROCHLORIDE 2 MG/ML
4 INJECTION, SOLUTION INTRAVENOUS
Status: COMPLETED | OUTPATIENT
Start: 2025-08-14 | End: 2025-08-14

## 2025-08-14 RX ORDER — ONDANSETRON 4 MG/1
4 TABLET, ORALLY DISINTEGRATING ORAL EVERY 8 HOURS PRN
Qty: 16 TABLET | Refills: 0 | Status: SHIPPED | OUTPATIENT
Start: 2025-08-14 | End: 2025-08-19 | Stop reason: ALTCHOICE

## 2025-08-14 RX ADMIN — ONDANSETRON 4 MG: 2 INJECTION INTRAMUSCULAR; INTRAVENOUS at 04:08

## 2025-08-14 RX ADMIN — DROPERIDOL 1.25 MG: 2.5 INJECTION, SOLUTION INTRAMUSCULAR; INTRAVENOUS at 04:08

## 2025-08-14 RX ADMIN — IOHEXOL 75 ML: 350 INJECTION, SOLUTION INTRAVENOUS at 05:08

## 2025-08-14 RX ADMIN — OXYCODONE HYDROCHLORIDE 5 MG: 5 TABLET ORAL at 07:08

## 2025-08-14 RX ADMIN — DIPHENHYDRAMINE HYDROCHLORIDE 25 MG: 50 INJECTION, SOLUTION INTRAMUSCULAR; INTRAVENOUS at 04:08

## 2025-08-14 RX ADMIN — SODIUM CHLORIDE 1000 ML: 9 INJECTION, SOLUTION INTRAVENOUS at 05:08

## 2025-08-15 DIAGNOSIS — R94.31 PROLONGED Q-T INTERVAL ON ECG: Primary | ICD-10-CM

## 2025-08-15 LAB
HOLD SPECIMEN: NORMAL
OHS QRS DURATION: 94 MS
OHS QTC CALCULATION: 509 MS

## 2025-08-19 ENCOUNTER — OFFICE VISIT (OUTPATIENT)
Dept: INTERNAL MEDICINE | Facility: CLINIC | Age: 65
End: 2025-08-19
Payer: MEDICARE

## 2025-08-19 VITALS
WEIGHT: 167.31 LBS | DIASTOLIC BLOOD PRESSURE: 70 MMHG | OXYGEN SATURATION: 94 % | SYSTOLIC BLOOD PRESSURE: 120 MMHG | HEIGHT: 63 IN | BODY MASS INDEX: 29.64 KG/M2 | HEART RATE: 73 BPM

## 2025-08-19 DIAGNOSIS — I10 ESSENTIAL HYPERTENSION: Chronic | ICD-10-CM

## 2025-08-19 DIAGNOSIS — F51.01 PRIMARY INSOMNIA: Chronic | ICD-10-CM

## 2025-08-19 DIAGNOSIS — K51.90 ULCERATIVE COLITIS WITHOUT COMPLICATIONS, UNSPECIFIED LOCATION: ICD-10-CM

## 2025-08-19 DIAGNOSIS — F41.9 ANXIETY: Chronic | ICD-10-CM

## 2025-08-19 DIAGNOSIS — R09.81 NASAL CONGESTION: Primary | ICD-10-CM

## 2025-08-19 PROCEDURE — 1159F MED LIST DOCD IN RCRD: CPT | Mod: CPTII,S$GLB,, | Performed by: STUDENT IN AN ORGANIZED HEALTH CARE EDUCATION/TRAINING PROGRAM

## 2025-08-19 PROCEDURE — 99999 PR PBB SHADOW E&M-EST. PATIENT-LVL IV: CPT | Mod: PBBFAC,,, | Performed by: STUDENT IN AN ORGANIZED HEALTH CARE EDUCATION/TRAINING PROGRAM

## 2025-08-19 PROCEDURE — 3078F DIAST BP <80 MM HG: CPT | Mod: CPTII,S$GLB,, | Performed by: STUDENT IN AN ORGANIZED HEALTH CARE EDUCATION/TRAINING PROGRAM

## 2025-08-19 PROCEDURE — 1101F PT FALLS ASSESS-DOCD LE1/YR: CPT | Mod: CPTII,S$GLB,, | Performed by: STUDENT IN AN ORGANIZED HEALTH CARE EDUCATION/TRAINING PROGRAM

## 2025-08-19 PROCEDURE — 99204 OFFICE O/P NEW MOD 45 MIN: CPT | Mod: S$GLB,,, | Performed by: STUDENT IN AN ORGANIZED HEALTH CARE EDUCATION/TRAINING PROGRAM

## 2025-08-19 PROCEDURE — 3288F FALL RISK ASSESSMENT DOCD: CPT | Mod: CPTII,S$GLB,, | Performed by: STUDENT IN AN ORGANIZED HEALTH CARE EDUCATION/TRAINING PROGRAM

## 2025-08-19 PROCEDURE — 3074F SYST BP LT 130 MM HG: CPT | Mod: CPTII,S$GLB,, | Performed by: STUDENT IN AN ORGANIZED HEALTH CARE EDUCATION/TRAINING PROGRAM

## 2025-08-19 PROCEDURE — 3008F BODY MASS INDEX DOCD: CPT | Mod: CPTII,S$GLB,, | Performed by: STUDENT IN AN ORGANIZED HEALTH CARE EDUCATION/TRAINING PROGRAM

## 2025-08-19 RX ORDER — BENZONATATE 100 MG/1
100 CAPSULE ORAL 3 TIMES DAILY PRN
COMMUNITY
End: 2025-08-19 | Stop reason: ALTCHOICE

## 2025-08-19 RX ORDER — BENZONATATE 200 MG/1
200 CAPSULE ORAL 3 TIMES DAILY PRN
Qty: 60 CAPSULE | Refills: 0 | Status: SHIPPED | OUTPATIENT
Start: 2025-08-19 | End: 2025-09-08

## 2025-08-19 RX ORDER — FLUTICASONE PROPIONATE 50 MCG
1 SPRAY, SUSPENSION (ML) NASAL DAILY
Qty: 9.9 ML | Refills: 0 | Status: SHIPPED | OUTPATIENT
Start: 2025-08-19

## 2025-08-20 ENCOUNTER — LAB VISIT (OUTPATIENT)
Dept: LAB | Facility: HOSPITAL | Age: 65
End: 2025-08-20
Payer: MEDICARE

## 2025-08-20 ENCOUNTER — OFFICE VISIT (OUTPATIENT)
Dept: GASTROENTEROLOGY | Facility: CLINIC | Age: 65
End: 2025-08-20
Payer: MEDICARE

## 2025-08-20 ENCOUNTER — TELEPHONE (OUTPATIENT)
Dept: ENDOSCOPY | Facility: HOSPITAL | Age: 65
End: 2025-08-20
Payer: MEDICARE

## 2025-08-20 VITALS
DIASTOLIC BLOOD PRESSURE: 79 MMHG | WEIGHT: 168.63 LBS | SYSTOLIC BLOOD PRESSURE: 118 MMHG | BODY MASS INDEX: 29.88 KG/M2 | HEART RATE: 78 BPM | HEIGHT: 63 IN

## 2025-08-20 DIAGNOSIS — Z12.11 COLON CANCER SCREENING: ICD-10-CM

## 2025-08-20 DIAGNOSIS — K51.00 ULCERATIVE PANCOLITIS: Primary | ICD-10-CM

## 2025-08-20 DIAGNOSIS — K51.00 ULCERATIVE PANCOLITIS: ICD-10-CM

## 2025-08-20 DIAGNOSIS — K51.90 ULCERATIVE COLITIS WITHOUT COMPLICATIONS, UNSPECIFIED LOCATION: Primary | ICD-10-CM

## 2025-08-20 DIAGNOSIS — Z78.0 MENOPAUSE: ICD-10-CM

## 2025-08-20 PROCEDURE — 3078F DIAST BP <80 MM HG: CPT | Mod: CPTII,GC,S$GLB, | Performed by: STUDENT IN AN ORGANIZED HEALTH CARE EDUCATION/TRAINING PROGRAM

## 2025-08-20 PROCEDURE — 87507 IADNA-DNA/RNA PROBE TQ 12-25: CPT

## 2025-08-20 PROCEDURE — 83993 ASSAY FOR CALPROTECTIN FECAL: CPT

## 2025-08-20 PROCEDURE — 99204 OFFICE O/P NEW MOD 45 MIN: CPT | Mod: GC,S$GLB,, | Performed by: STUDENT IN AN ORGANIZED HEALTH CARE EDUCATION/TRAINING PROGRAM

## 2025-08-20 PROCEDURE — 3288F FALL RISK ASSESSMENT DOCD: CPT | Mod: CPTII,GC,S$GLB, | Performed by: STUDENT IN AN ORGANIZED HEALTH CARE EDUCATION/TRAINING PROGRAM

## 2025-08-20 PROCEDURE — 99999 PR PBB SHADOW E&M-EST. PATIENT-LVL IV: CPT | Mod: PBBFAC,GC,, | Performed by: STUDENT IN AN ORGANIZED HEALTH CARE EDUCATION/TRAINING PROGRAM

## 2025-08-20 PROCEDURE — 3008F BODY MASS INDEX DOCD: CPT | Mod: CPTII,GC,S$GLB, | Performed by: STUDENT IN AN ORGANIZED HEALTH CARE EDUCATION/TRAINING PROGRAM

## 2025-08-20 PROCEDURE — 1101F PT FALLS ASSESS-DOCD LE1/YR: CPT | Mod: CPTII,GC,S$GLB, | Performed by: STUDENT IN AN ORGANIZED HEALTH CARE EDUCATION/TRAINING PROGRAM

## 2025-08-20 PROCEDURE — 3074F SYST BP LT 130 MM HG: CPT | Mod: CPTII,GC,S$GLB, | Performed by: STUDENT IN AN ORGANIZED HEALTH CARE EDUCATION/TRAINING PROGRAM

## 2025-08-20 RX ORDER — SODIUM, POTASSIUM,MAG SULFATES 17.5-3.13G
1 SOLUTION, RECONSTITUTED, ORAL ORAL DAILY
Qty: 1 KIT | Refills: 0 | Status: SHIPPED | OUTPATIENT
Start: 2025-08-20 | End: 2025-08-22

## 2025-08-21 ENCOUNTER — PATIENT MESSAGE (OUTPATIENT)
Dept: INTERNAL MEDICINE | Facility: CLINIC | Age: 65
End: 2025-08-21

## 2025-08-21 ENCOUNTER — TELEPHONE (OUTPATIENT)
Dept: INTERNAL MEDICINE | Facility: CLINIC | Age: 65
End: 2025-08-21
Payer: MEDICARE

## 2025-08-21 DIAGNOSIS — J01.90 ACUTE BACTERIAL SINUSITIS: Primary | ICD-10-CM

## 2025-08-21 DIAGNOSIS — B96.89 ACUTE BACTERIAL SINUSITIS: Primary | ICD-10-CM

## 2025-08-21 LAB
ADV 40+41 DNA STL QL NAA+NON-PROBE: NOT DETECTED
ASTRO TYP 1-8 RNA STL QL NAA+NON-PROBE: NOT DETECTED
C CAYETANENSIS DNA STL QL NAA+NON-PROBE: NOT DETECTED
C COLI+JEJ+UPSA DNA STL QL NAA+NON-PROBE: NOT DETECTED
CALPROTECTIN INTERP (OHS): ABNORMAL
CALPROTECTIN STOOL (OHS): 256 ΜG/G
CRYPTOSP DNA STL QL NAA+NON-PROBE: NOT DETECTED
E HISTOLYT DNA STL QL NAA+NON-PROBE: NOT DETECTED
EAEC PAA PLAS AGGR+AATA ST NAA+NON-PRB: NOT DETECTED
EC STX1+STX2 GENES STL QL NAA+NON-PROBE: NOT DETECTED
EPEC EAE GENE STL QL NAA+NON-PROBE: NOT DETECTED
ETEC LTA+ST1A+ST1B TOX ST NAA+NON-PROBE: NOT DETECTED
G LAMBLIA DNA STL QL NAA+NON-PROBE: NOT DETECTED
NOROVIRUS GI+II RNA STL QL NAA+NON-PROBE: NOT DETECTED
P SHIGELLOIDES DNA STL QL NAA+NON-PROBE: NOT DETECTED
RVA RNA STL QL NAA+NON-PROBE: NOT DETECTED
S ENT+BONG DNA STL QL NAA+NON-PROBE: NOT DETECTED
SAPO I+II+IV+V RNA STL QL NAA+NON-PROBE: NOT DETECTED
SHIGELLA SP+EIEC IPAH ST NAA+NON-PROBE: NOT DETECTED
V CHOL+PARA+VUL DNA STL QL NAA+NON-PROBE: NOT DETECTED
V CHOLERAE DNA STL QL NAA+NON-PROBE: NOT DETECTED
Y ENTEROCOL DNA STL QL NAA+NON-PROBE: NOT DETECTED

## 2025-08-21 RX ORDER — AMOXICILLIN AND CLAVULANATE POTASSIUM 875; 125 MG/1; MG/1
1 TABLET, FILM COATED ORAL 2 TIMES DAILY
Qty: 14 TABLET | Refills: 0 | Status: CANCELLED | OUTPATIENT
Start: 2025-08-21

## 2025-08-21 RX ORDER — DOXYCYCLINE HYCLATE 100 MG
100 TABLET ORAL 2 TIMES DAILY
Qty: 14 TABLET | Refills: 0 | Status: SHIPPED | OUTPATIENT
Start: 2025-08-21

## 2025-08-21 RX ORDER — PROMETHAZINE HYDROCHLORIDE AND DEXTROMETHORPHAN HYDROBROMIDE 6.25; 15 MG/5ML; MG/5ML
5 SYRUP ORAL NIGHTLY
Qty: 118 ML | Refills: 1 | Status: SHIPPED | OUTPATIENT
Start: 2025-08-21 | End: 2025-10-07

## 2025-08-29 ENCOUNTER — TELEPHONE (OUTPATIENT)
Dept: INTERNAL MEDICINE | Facility: CLINIC | Age: 65
End: 2025-08-29
Payer: MEDICARE

## 2025-08-29 ENCOUNTER — TELEPHONE (OUTPATIENT)
Dept: GASTROENTEROLOGY | Facility: CLINIC | Age: 65
End: 2025-08-29
Payer: MEDICARE

## 2025-08-29 ENCOUNTER — PATIENT MESSAGE (OUTPATIENT)
Dept: INTERNAL MEDICINE | Facility: CLINIC | Age: 65
End: 2025-08-29

## (undated) DEVICE — CATH CONE TIP

## (undated) DEVICE — CATH URTRL OPEN END STR TIP 5F

## (undated) DEVICE — Device

## (undated) DEVICE — BRUSH SCRUB SURGICALW/BETADINE

## (undated) DEVICE — SEE L#120831

## (undated) DEVICE — ELECTRODE REM PLYHSV RETURN 9

## (undated) DEVICE — GUIDE WIRE MOTION .035 X 150CM

## (undated) DEVICE — SET CYSTO IRRIGATION UNIV SPIK

## (undated) DEVICE — VIDEO RENTAL SERVICE CYSTO

## (undated) DEVICE — FIBER QUANTA OPT SDT 272UM

## (undated) DEVICE — SEAL UROLOGY

## (undated) DEVICE — SOL IRR NACL .9% 3000ML

## (undated) DEVICE — SOL PVP-I SCRUB 7.5% 4OZ

## (undated) DEVICE — GLOVE BIOGEL SKINSENSE PI 7.5